# Patient Record
Sex: MALE | Race: WHITE | NOT HISPANIC OR LATINO | Employment: FULL TIME | URBAN - METROPOLITAN AREA
[De-identification: names, ages, dates, MRNs, and addresses within clinical notes are randomized per-mention and may not be internally consistent; named-entity substitution may affect disease eponyms.]

---

## 2020-09-16 ENCOUNTER — HOSPITAL ENCOUNTER (INPATIENT)
Facility: HOSPITAL | Age: 60
LOS: 2 days | Discharge: HOME | End: 2020-09-19
Attending: EMERGENCY MEDICINE | Admitting: HOSPITALIST

## 2020-09-16 DIAGNOSIS — I10 ESSENTIAL HYPERTENSION: ICD-10-CM

## 2020-09-16 DIAGNOSIS — I50.41 ACUTE COMBINED SYSTOLIC AND DIASTOLIC CONGESTIVE HEART FAILURE (HCC): Primary | ICD-10-CM

## 2020-09-16 PROCEDURE — 99284 EMERGENCY DEPT VISIT MOD MDM: CPT

## 2020-09-16 PROCEDURE — 93005 ELECTROCARDIOGRAM TRACING: CPT | Performed by: EMERGENCY MEDICINE

## 2020-09-17 ENCOUNTER — APPOINTMENT (OUTPATIENT)
Dept: CARDIOLOGY | Facility: HOSPITAL | Age: 60
End: 2020-09-17

## 2020-09-17 ENCOUNTER — APPOINTMENT (OUTPATIENT)
Dept: CT IMAGING | Facility: HOSPITAL | Age: 60
End: 2020-09-17

## 2020-09-17 ENCOUNTER — APPOINTMENT (OUTPATIENT)
Dept: GENERAL RADIOLOGY | Facility: HOSPITAL | Age: 60
End: 2020-09-17

## 2020-09-17 PROBLEM — F10.20 ALCOHOLISM (HCC): Status: ACTIVE | Noted: 2020-09-17

## 2020-09-17 PROBLEM — I10 ESSENTIAL HYPERTENSION: Status: ACTIVE | Noted: 2020-09-17

## 2020-09-17 PROBLEM — Z72.0 TOBACCO ABUSE: Status: ACTIVE | Noted: 2020-09-17

## 2020-09-17 PROBLEM — D75.89 MACROCYTOSIS WITHOUT ANEMIA: Status: ACTIVE | Noted: 2020-09-17

## 2020-09-17 PROBLEM — I50.41 ACUTE COMBINED SYSTOLIC AND DIASTOLIC CONGESTIVE HEART FAILURE: Status: ACTIVE | Noted: 2020-09-17

## 2020-09-17 LAB
ALBUMIN SERPL-MCNC: 4 G/DL (ref 3.5–5.2)
ALBUMIN/GLOB SERPL: 1.6 G/DL
ALP SERPL-CCNC: 89 U/L (ref 39–117)
ALT SERPL W P-5'-P-CCNC: 20 U/L (ref 1–41)
ANION GAP SERPL CALCULATED.3IONS-SCNC: 12 MMOL/L (ref 5–15)
ANION GAP SERPL CALCULATED.3IONS-SCNC: 13 MMOL/L (ref 5–15)
AST SERPL-CCNC: 31 U/L (ref 1–40)
B PARAPERT DNA SPEC QL NAA+PROBE: NOT DETECTED
B PERT DNA SPEC QL NAA+PROBE: NOT DETECTED
BASOPHILS # BLD AUTO: 0.07 10*3/MM3 (ref 0–0.2)
BASOPHILS # BLD AUTO: 0.1 10*3/MM3 (ref 0–0.2)
BASOPHILS NFR BLD AUTO: 0.7 % (ref 0–1.5)
BASOPHILS NFR BLD AUTO: 1 % (ref 0–1.5)
BH CV ECHO MEAS - AO ROOT AREA (BSA CORRECTED): 2
BH CV ECHO MEAS - AO ROOT AREA: 8.9 CM^2
BH CV ECHO MEAS - AO ROOT DIAM: 3.4 CM
BH CV ECHO MEAS - BSA(HAYCOCK): 1.7 M^2
BH CV ECHO MEAS - BSA: 1.7 M^2
BH CV ECHO MEAS - BZI_BMI: 19.9 KILOGRAMS/M^2
BH CV ECHO MEAS - BZI_METRIC_HEIGHT: 172.7 CM
BH CV ECHO MEAS - BZI_METRIC_WEIGHT: 59.4 KG
BH CV ECHO MEAS - EDV(CUBED): 309.1 ML
BH CV ECHO MEAS - EDV(MOD-SP2): 142 ML
BH CV ECHO MEAS - EDV(MOD-SP4): 151 ML
BH CV ECHO MEAS - EDV(TEICH): 236.2 ML
BH CV ECHO MEAS - EF(CUBED): 28.2 %
BH CV ECHO MEAS - EF(MOD-BP): 21 %
BH CV ECHO MEAS - EF(MOD-SP2): 17.6 %
BH CV ECHO MEAS - EF(MOD-SP4): 23.2 %
BH CV ECHO MEAS - EF(TEICH): 22.2 %
BH CV ECHO MEAS - ESV(CUBED): 221.8 ML
BH CV ECHO MEAS - ESV(MOD-SP2): 117 ML
BH CV ECHO MEAS - ESV(MOD-SP4): 116 ML
BH CV ECHO MEAS - ESV(TEICH): 183.7 ML
BH CV ECHO MEAS - FS: 10.5 %
BH CV ECHO MEAS - IVS/LVPW: 1.2
BH CV ECHO MEAS - IVSD: 1 CM
BH CV ECHO MEAS - LA DIMENSION: 4.9 CM
BH CV ECHO MEAS - LA/AO: 1.5
BH CV ECHO MEAS - LAD MAJOR: 6.9 CM
BH CV ECHO MEAS - LAT PEAK E' VEL: 5 CM/SEC
BH CV ECHO MEAS - LATERAL E/E' RATIO: 16.4
BH CV ECHO MEAS - LV DIASTOLIC VOL/BSA (35-75): 88.4 ML/M^2
BH CV ECHO MEAS - LV MASS(C)D: 278.4 GRAMS
BH CV ECHO MEAS - LV MASS(C)DI: 163 GRAMS/M^2
BH CV ECHO MEAS - LV SYSTOLIC VOL/BSA (12-30): 67.9 ML/M^2
BH CV ECHO MEAS - LVIDD: 6.8 CM
BH CV ECHO MEAS - LVIDS: 6.1 CM
BH CV ECHO MEAS - LVLD AP2: 7.3 CM
BH CV ECHO MEAS - LVLD AP4: 6.5 CM
BH CV ECHO MEAS - LVLS AP2: 7.5 CM
BH CV ECHO MEAS - LVLS AP4: 6.6 CM
BH CV ECHO MEAS - LVPWD: 0.84 CM
BH CV ECHO MEAS - MED PEAK E' VEL: 3.7 CM/SEC
BH CV ECHO MEAS - MEDIAL E/E' RATIO: 22
BH CV ECHO MEAS - MR ALIAS VEL: 94.7 CM/SEC
BH CV ECHO MEAS - MR ERO: 0.53 CM^2
BH CV ECHO MEAS - MR FLOW RATE: 279.3 CM^3/SEC
BH CV ECHO MEAS - MR MAX PG: 111 MMHG
BH CV ECHO MEAS - MR MAX VEL: 525.7 CM/SEC
BH CV ECHO MEAS - MR MEAN PG: 70.6 MMHG
BH CV ECHO MEAS - MR MEAN VEL: 394.3 CM/SEC
BH CV ECHO MEAS - MR PISA RADIUS: 0.69 CM
BH CV ECHO MEAS - MR PISA: 2.9 CM^2
BH CV ECHO MEAS - MR VOLUME: 106.2 ML
BH CV ECHO MEAS - MR VTI: 199.9 CM
BH CV ECHO MEAS - MV A MAX VEL: 80 CM/SEC
BH CV ECHO MEAS - MV AREA (1 DIAM): 17.6 CM^2
BH CV ECHO MEAS - MV DEC SLOPE: 924.2 CM/SEC^2
BH CV ECHO MEAS - MV DEC TIME: 0.11 SEC
BH CV ECHO MEAS - MV DIAM: 4.7 CM
BH CV ECHO MEAS - MV E MAX VEL: 84.9 CM/SEC
BH CV ECHO MEAS - MV E/A: 1.1
BH CV ECHO MEAS - MV FLOW AREA(1DIAM): 17.6 CM^2
BH CV ECHO MEAS - MV MAX PG: 4.5 MMHG
BH CV ECHO MEAS - MV MEAN PG: 2.3 MMHG
BH CV ECHO MEAS - MV P1/2T MAX VEL: 117 CM/SEC
BH CV ECHO MEAS - MV P1/2T: 37.1 MSEC
BH CV ECHO MEAS - MV V2 MAX: 106 CM/SEC
BH CV ECHO MEAS - MV V2 MEAN: 73 CM/SEC
BH CV ECHO MEAS - MV V2 VTI: 16.2 CM
BH CV ECHO MEAS - MVA P1/2T LCG: 1.9 CM^2
BH CV ECHO MEAS - MVA(P1/2T): 5.9 CM^2
BH CV ECHO MEAS - PA ACC SLOPE: 301.2 CM/SEC^2
BH CV ECHO MEAS - PA ACC TIME: 0.18 SEC
BH CV ECHO MEAS - PA PR(ACCEL): -3.4 MMHG
BH CV ECHO MEAS - SI(CUBED): 51.1 ML/M^2
BH CV ECHO MEAS - SI(MOD-SP2): 14.6 ML/M^2
BH CV ECHO MEAS - SI(MOD-SP4): 20.5 ML/M^2
BH CV ECHO MEAS - SI(MV 1 DIAM): 166.6 ML/M^2
BH CV ECHO MEAS - SI(TEICH): 30.7 ML/M^2
BH CV ECHO MEAS - SV(CUBED): 87.3 ML
BH CV ECHO MEAS - SV(MOD-SP2): 25 ML
BH CV ECHO MEAS - SV(MOD-SP4): 35 ML
BH CV ECHO MEAS - SV(MV 1 DIAM): 284.4 ML
BH CV ECHO MEAS - SV(TEICH): 52.5 ML
BH CV ECHO MEAS - TAPSE (>1.6): 2.4 CM2
BH CV ECHO MEASUREMENTS AVERAGE E/E' RATIO: 19.52
BH CV VAS BP RIGHT ARM: NORMAL MMHG
BH CV XLRA - RV BASE: 3.6 CM
BH CV XLRA - RV LENGTH: 7.1 CM
BH CV XLRA - RV MID: 2.5 CM
BH CV XLRA - TDI S': 11.3 CM/SEC
BILIRUB SERPL-MCNC: 0.3 MG/DL (ref 0–1.2)
BUN SERPL-MCNC: 17 MG/DL (ref 6–20)
BUN SERPL-MCNC: 18 MG/DL (ref 6–20)
BUN/CREAT SERPL: 14.9 (ref 7–25)
BUN/CREAT SERPL: 14.9 (ref 7–25)
C PNEUM DNA NPH QL NAA+NON-PROBE: NOT DETECTED
CALCIUM SPEC-SCNC: 9.2 MG/DL (ref 8.6–10.5)
CALCIUM SPEC-SCNC: 9.4 MG/DL (ref 8.6–10.5)
CHLORIDE SERPL-SCNC: 102 MMOL/L (ref 98–107)
CHLORIDE SERPL-SCNC: 97 MMOL/L (ref 98–107)
CHOLEST SERPL-MCNC: 129 MG/DL (ref 0–200)
CO2 SERPL-SCNC: 26 MMOL/L (ref 22–29)
CO2 SERPL-SCNC: 28 MMOL/L (ref 22–29)
CREAT SERPL-MCNC: 1.14 MG/DL (ref 0.76–1.27)
CREAT SERPL-MCNC: 1.21 MG/DL (ref 0.76–1.27)
DEPRECATED RDW RBC AUTO: 45.6 FL (ref 37–54)
DEPRECATED RDW RBC AUTO: 46 FL (ref 37–54)
EOSINOPHIL # BLD AUTO: 0.17 10*3/MM3 (ref 0–0.4)
EOSINOPHIL # BLD AUTO: 0.23 10*3/MM3 (ref 0–0.4)
EOSINOPHIL NFR BLD AUTO: 1.8 % (ref 0.3–6.2)
EOSINOPHIL NFR BLD AUTO: 2.2 % (ref 0.3–6.2)
ERYTHROCYTE [DISTWIDTH] IN BLOOD BY AUTOMATED COUNT: 12.1 % (ref 12.3–15.4)
ERYTHROCYTE [DISTWIDTH] IN BLOOD BY AUTOMATED COUNT: 12.2 % (ref 12.3–15.4)
FLUAV H1 2009 PAND RNA NPH QL NAA+PROBE: NOT DETECTED
FLUAV H1 HA GENE NPH QL NAA+PROBE: NOT DETECTED
FLUAV H3 RNA NPH QL NAA+PROBE: NOT DETECTED
FLUAV SUBTYP SPEC NAA+PROBE: NOT DETECTED
FLUBV RNA ISLT QL NAA+PROBE: NOT DETECTED
GFR SERPL CREATININE-BSD FRML MDRD: 62 ML/MIN/1.73
GFR SERPL CREATININE-BSD FRML MDRD: 66 ML/MIN/1.73
GLOBULIN UR ELPH-MCNC: 2.5 GM/DL
GLUCOSE BLDC GLUCOMTR-MCNC: 104 MG/DL (ref 70–130)
GLUCOSE SERPL-MCNC: 86 MG/DL (ref 65–99)
GLUCOSE SERPL-MCNC: 92 MG/DL (ref 65–99)
HADV DNA SPEC NAA+PROBE: NOT DETECTED
HCOV 229E RNA SPEC QL NAA+PROBE: NOT DETECTED
HCOV HKU1 RNA SPEC QL NAA+PROBE: NOT DETECTED
HCOV NL63 RNA SPEC QL NAA+PROBE: NOT DETECTED
HCOV OC43 RNA SPEC QL NAA+PROBE: NOT DETECTED
HCT VFR BLD AUTO: 44.6 % (ref 37.5–51)
HCT VFR BLD AUTO: 45.4 % (ref 37.5–51)
HDLC SERPL-MCNC: 43 MG/DL (ref 40–60)
HGB BLD-MCNC: 15.2 G/DL (ref 13–17.7)
HGB BLD-MCNC: 15.6 G/DL (ref 13–17.7)
HMPV RNA NPH QL NAA+NON-PROBE: NOT DETECTED
HOLD SPECIMEN: NORMAL
HOLD SPECIMEN: NORMAL
HPIV1 RNA SPEC QL NAA+PROBE: NOT DETECTED
HPIV2 RNA SPEC QL NAA+PROBE: NOT DETECTED
HPIV3 RNA NPH QL NAA+PROBE: NOT DETECTED
HPIV4 P GENE NPH QL NAA+PROBE: NOT DETECTED
IMM GRANULOCYTES # BLD AUTO: 0.03 10*3/MM3 (ref 0–0.05)
IMM GRANULOCYTES # BLD AUTO: 0.04 10*3/MM3 (ref 0–0.05)
IMM GRANULOCYTES NFR BLD AUTO: 0.3 % (ref 0–0.5)
IMM GRANULOCYTES NFR BLD AUTO: 0.4 % (ref 0–0.5)
LDLC SERPL CALC-MCNC: 77 MG/DL (ref 0–100)
LDLC/HDLC SERPL: 1.78 {RATIO}
LYMPHOCYTES # BLD AUTO: 1.73 10*3/MM3 (ref 0.7–3.1)
LYMPHOCYTES # BLD AUTO: 1.96 10*3/MM3 (ref 0.7–3.1)
LYMPHOCYTES NFR BLD AUTO: 18.2 % (ref 19.6–45.3)
LYMPHOCYTES NFR BLD AUTO: 18.7 % (ref 19.6–45.3)
M PNEUMO IGG SER IA-ACNC: NOT DETECTED
MAXIMAL PREDICTED HEART RATE: 162 BPM
MCH RBC QN AUTO: 34.9 PG (ref 26.6–33)
MCH RBC QN AUTO: 35 PG (ref 26.6–33)
MCHC RBC AUTO-ENTMCNC: 34.1 G/DL (ref 31.5–35.7)
MCHC RBC AUTO-ENTMCNC: 34.4 G/DL (ref 31.5–35.7)
MCV RBC AUTO: 101.8 FL (ref 79–97)
MCV RBC AUTO: 102.3 FL (ref 79–97)
MONOCYTES # BLD AUTO: 0.78 10*3/MM3 (ref 0.1–0.9)
MONOCYTES # BLD AUTO: 0.94 10*3/MM3 (ref 0.1–0.9)
MONOCYTES NFR BLD AUTO: 8.2 % (ref 5–12)
MONOCYTES NFR BLD AUTO: 9 % (ref 5–12)
NEUTROPHILS NFR BLD AUTO: 6.7 10*3/MM3 (ref 1.7–7)
NEUTROPHILS NFR BLD AUTO: 68.7 % (ref 42.7–76)
NEUTROPHILS NFR BLD AUTO: 7.23 10*3/MM3 (ref 1.7–7)
NEUTROPHILS NFR BLD AUTO: 70.8 % (ref 42.7–76)
NRBC BLD AUTO-RTO: 0 /100 WBC (ref 0–0.2)
NRBC BLD AUTO-RTO: 0 /100 WBC (ref 0–0.2)
NT-PROBNP SERPL-MCNC: 5229 PG/ML (ref 0–900)
PLATELET # BLD AUTO: 176 10*3/MM3 (ref 140–450)
PLATELET # BLD AUTO: 187 10*3/MM3 (ref 140–450)
PMV BLD AUTO: 10.3 FL (ref 6–12)
PMV BLD AUTO: 10.4 FL (ref 6–12)
POTASSIUM SERPL-SCNC: 3.6 MMOL/L (ref 3.5–5.2)
POTASSIUM SERPL-SCNC: 3.9 MMOL/L (ref 3.5–5.2)
PROT SERPL-MCNC: 6.5 G/DL (ref 6–8.5)
RBC # BLD AUTO: 4.36 10*6/MM3 (ref 4.14–5.8)
RBC # BLD AUTO: 4.46 10*6/MM3 (ref 4.14–5.8)
RHINOVIRUS RNA SPEC NAA+PROBE: NOT DETECTED
RSV RNA NPH QL NAA+NON-PROBE: NOT DETECTED
SARS-COV-2 RNA NPH QL NAA+NON-PROBE: NOT DETECTED
SODIUM SERPL-SCNC: 138 MMOL/L (ref 136–145)
SODIUM SERPL-SCNC: 140 MMOL/L (ref 136–145)
STRESS TARGET HR: 138 BPM
TRIGL SERPL-MCNC: 47 MG/DL (ref 0–150)
TROPONIN T SERPL-MCNC: <0.01 NG/ML (ref 0–0.03)
TSH SERPL DL<=0.05 MIU/L-ACNC: 2.6 UIU/ML (ref 0.27–4.2)
VLDLC SERPL-MCNC: 9.4 MG/DL
WBC # BLD AUTO: 10.5 10*3/MM3 (ref 3.4–10.8)
WBC # BLD AUTO: 9.48 10*3/MM3 (ref 3.4–10.8)
WHOLE BLOOD HOLD SPECIMEN: NORMAL
WHOLE BLOOD HOLD SPECIMEN: NORMAL

## 2020-09-17 PROCEDURE — 84484 ASSAY OF TROPONIN QUANT: CPT | Performed by: PHYSICIAN ASSISTANT

## 2020-09-17 PROCEDURE — 85025 COMPLETE CBC W/AUTO DIFF WBC: CPT | Performed by: EMERGENCY MEDICINE

## 2020-09-17 PROCEDURE — 94799 UNLISTED PULMONARY SVC/PX: CPT

## 2020-09-17 PROCEDURE — 80053 COMPREHEN METABOLIC PANEL: CPT | Performed by: EMERGENCY MEDICINE

## 2020-09-17 PROCEDURE — 80061 LIPID PANEL: CPT | Performed by: PHYSICIAN ASSISTANT

## 2020-09-17 PROCEDURE — 99223 1ST HOSP IP/OBS HIGH 75: CPT | Performed by: FAMILY MEDICINE

## 2020-09-17 PROCEDURE — 25010000002 FUROSEMIDE PER 20 MG: Performed by: HOSPITALIST

## 2020-09-17 PROCEDURE — 80048 BASIC METABOLIC PNL TOTAL CA: CPT | Performed by: PHYSICIAN ASSISTANT

## 2020-09-17 PROCEDURE — 25010000002 ENOXAPARIN PER 10 MG: Performed by: PHYSICIAN ASSISTANT

## 2020-09-17 PROCEDURE — 94640 AIRWAY INHALATION TREATMENT: CPT

## 2020-09-17 PROCEDURE — 84443 ASSAY THYROID STIM HORMONE: CPT | Performed by: PHYSICIAN ASSISTANT

## 2020-09-17 PROCEDURE — 83880 ASSAY OF NATRIURETIC PEPTIDE: CPT | Performed by: EMERGENCY MEDICINE

## 2020-09-17 PROCEDURE — 25010000002 FUROSEMIDE PER 20 MG: Performed by: EMERGENCY MEDICINE

## 2020-09-17 PROCEDURE — 0202U NFCT DS 22 TRGT SARS-COV-2: CPT | Performed by: INTERNAL MEDICINE

## 2020-09-17 PROCEDURE — 85025 COMPLETE CBC W/AUTO DIFF WBC: CPT | Performed by: FAMILY MEDICINE

## 2020-09-17 PROCEDURE — 84484 ASSAY OF TROPONIN QUANT: CPT | Performed by: EMERGENCY MEDICINE

## 2020-09-17 PROCEDURE — 71275 CT ANGIOGRAPHY CHEST: CPT

## 2020-09-17 PROCEDURE — 93306 TTE W/DOPPLER COMPLETE: CPT

## 2020-09-17 PROCEDURE — 71045 X-RAY EXAM CHEST 1 VIEW: CPT

## 2020-09-17 PROCEDURE — 0 IOPAMIDOL PER 1 ML: Performed by: FAMILY MEDICINE

## 2020-09-17 PROCEDURE — 82962 GLUCOSE BLOOD TEST: CPT

## 2020-09-17 RX ORDER — FOLIC ACID 1 MG/1
1 TABLET ORAL DAILY
Status: DISCONTINUED | OUTPATIENT
Start: 2020-09-17 | End: 2020-09-19 | Stop reason: HOSPADM

## 2020-09-17 RX ORDER — ASPIRIN 81 MG/1
81 TABLET ORAL DAILY
Status: DISCONTINUED | OUTPATIENT
Start: 2020-09-18 | End: 2020-09-19 | Stop reason: HOSPADM

## 2020-09-17 RX ORDER — LORAZEPAM 2 MG/ML
4 INJECTION INTRAMUSCULAR
Status: DISCONTINUED | OUTPATIENT
Start: 2020-09-17 | End: 2020-09-19 | Stop reason: HOSPADM

## 2020-09-17 RX ORDER — LORAZEPAM 1 MG/1
1 TABLET ORAL
Status: DISCONTINUED | OUTPATIENT
Start: 2020-09-17 | End: 2020-09-19 | Stop reason: HOSPADM

## 2020-09-17 RX ORDER — THIAMINE MONONITRATE (VIT B1) 100 MG
100 TABLET ORAL DAILY
Status: DISCONTINUED | OUTPATIENT
Start: 2020-09-18 | End: 2020-09-17

## 2020-09-17 RX ORDER — SODIUM CHLORIDE 0.9 % (FLUSH) 0.9 %
10 SYRINGE (ML) INJECTION AS NEEDED
Status: DISCONTINUED | OUTPATIENT
Start: 2020-09-17 | End: 2020-09-19 | Stop reason: HOSPADM

## 2020-09-17 RX ORDER — SPIRONOLACTONE 25 MG/1
25 TABLET ORAL DAILY
Status: DISCONTINUED | OUTPATIENT
Start: 2020-09-17 | End: 2020-09-19 | Stop reason: HOSPADM

## 2020-09-17 RX ORDER — DIPHENOXYLATE HYDROCHLORIDE AND ATROPINE SULFATE 2.5; .025 MG/1; MG/1
1 TABLET ORAL DAILY
Status: DISCONTINUED | OUTPATIENT
Start: 2020-09-17 | End: 2020-09-19 | Stop reason: HOSPADM

## 2020-09-17 RX ORDER — ONDANSETRON 2 MG/ML
4 INJECTION INTRAMUSCULAR; INTRAVENOUS EVERY 6 HOURS PRN
Status: DISCONTINUED | OUTPATIENT
Start: 2020-09-17 | End: 2020-09-19 | Stop reason: HOSPADM

## 2020-09-17 RX ORDER — LORAZEPAM 1 MG/1
2 TABLET ORAL
Status: DISCONTINUED | OUTPATIENT
Start: 2020-09-17 | End: 2020-09-19 | Stop reason: HOSPADM

## 2020-09-17 RX ORDER — LORAZEPAM 2 MG/ML
2 INJECTION INTRAMUSCULAR
Status: DISCONTINUED | OUTPATIENT
Start: 2020-09-17 | End: 2020-09-19 | Stop reason: HOSPADM

## 2020-09-17 RX ORDER — THIAMINE MONONITRATE (VIT B1) 100 MG
100 TABLET ORAL DAILY
Status: DISCONTINUED | OUTPATIENT
Start: 2020-09-17 | End: 2020-09-19 | Stop reason: HOSPADM

## 2020-09-17 RX ORDER — IPRATROPIUM BROMIDE AND ALBUTEROL SULFATE 2.5; .5 MG/3ML; MG/3ML
3 SOLUTION RESPIRATORY (INHALATION)
Status: DISCONTINUED | OUTPATIENT
Start: 2020-09-17 | End: 2020-09-18

## 2020-09-17 RX ORDER — ASPIRIN 81 MG/1
324 TABLET, CHEWABLE ORAL ONCE
Status: DISCONTINUED | OUTPATIENT
Start: 2020-09-17 | End: 2020-09-17

## 2020-09-17 RX ORDER — LORAZEPAM 1 MG/1
4 TABLET ORAL
Status: DISCONTINUED | OUTPATIENT
Start: 2020-09-17 | End: 2020-09-19 | Stop reason: HOSPADM

## 2020-09-17 RX ORDER — DIPHENOXYLATE HYDROCHLORIDE AND ATROPINE SULFATE 2.5; .025 MG/1; MG/1
1 TABLET ORAL DAILY
Status: DISCONTINUED | OUTPATIENT
Start: 2020-09-18 | End: 2020-09-17

## 2020-09-17 RX ORDER — METOPROLOL SUCCINATE 25 MG/1
12.5 TABLET, EXTENDED RELEASE ORAL NIGHTLY
Status: DISCONTINUED | OUTPATIENT
Start: 2020-09-17 | End: 2020-09-19 | Stop reason: HOSPADM

## 2020-09-17 RX ORDER — ACETAMINOPHEN 325 MG/1
650 TABLET ORAL EVERY 4 HOURS PRN
Status: DISCONTINUED | OUTPATIENT
Start: 2020-09-17 | End: 2020-09-18 | Stop reason: SDUPTHER

## 2020-09-17 RX ORDER — FUROSEMIDE 40 MG/1
40 TABLET ORAL DAILY
Status: DISCONTINUED | OUTPATIENT
Start: 2020-09-17 | End: 2020-09-19 | Stop reason: HOSPADM

## 2020-09-17 RX ORDER — FUROSEMIDE 10 MG/ML
40 INJECTION INTRAMUSCULAR; INTRAVENOUS ONCE
Status: COMPLETED | OUTPATIENT
Start: 2020-09-17 | End: 2020-09-17

## 2020-09-17 RX ORDER — FOLIC ACID 1 MG/1
1 TABLET ORAL DAILY
Status: DISCONTINUED | OUTPATIENT
Start: 2020-09-18 | End: 2020-09-17

## 2020-09-17 RX ORDER — CHOLECALCIFEROL (VITAMIN D3) 125 MCG
5 CAPSULE ORAL NIGHTLY PRN
Status: DISCONTINUED | OUTPATIENT
Start: 2020-09-17 | End: 2020-09-19 | Stop reason: HOSPADM

## 2020-09-17 RX ORDER — LORAZEPAM 2 MG/ML
1 INJECTION INTRAMUSCULAR
Status: DISCONTINUED | OUTPATIENT
Start: 2020-09-17 | End: 2020-09-19 | Stop reason: HOSPADM

## 2020-09-17 RX ORDER — SODIUM CHLORIDE 0.9 % (FLUSH) 0.9 %
10 SYRINGE (ML) INJECTION EVERY 12 HOURS SCHEDULED
Status: DISCONTINUED | OUTPATIENT
Start: 2020-09-17 | End: 2020-09-19 | Stop reason: HOSPADM

## 2020-09-17 RX ADMIN — MULTIVITAMIN TABLET 1 TABLET: TABLET at 03:18

## 2020-09-17 RX ADMIN — METOPROLOL SUCCINATE 12.5 MG: 25 TABLET, EXTENDED RELEASE ORAL at 20:36

## 2020-09-17 RX ADMIN — SPIRONOLACTONE 25 MG: 25 TABLET ORAL at 11:54

## 2020-09-17 RX ADMIN — MULTIVITAMIN TABLET 1 TABLET: TABLET at 20:37

## 2020-09-17 RX ADMIN — SACUBITRIL AND VALSARTAN 1 TABLET: 24; 26 TABLET, FILM COATED ORAL at 20:36

## 2020-09-17 RX ADMIN — IPRATROPIUM BROMIDE AND ALBUTEROL SULFATE 3 ML: 2.5; .5 SOLUTION RESPIRATORY (INHALATION) at 09:40

## 2020-09-17 RX ADMIN — IOPAMIDOL 75 ML: 755 INJECTION, SOLUTION INTRAVENOUS at 03:36

## 2020-09-17 RX ADMIN — IPRATROPIUM BROMIDE AND ALBUTEROL SULFATE 3 ML: 2.5; .5 SOLUTION RESPIRATORY (INHALATION) at 14:14

## 2020-09-17 RX ADMIN — IPRATROPIUM BROMIDE AND ALBUTEROL SULFATE 3 ML: 2.5; .5 SOLUTION RESPIRATORY (INHALATION) at 16:58

## 2020-09-17 RX ADMIN — THIAMINE HCL TAB 100 MG 100 MG: 100 TAB at 20:37

## 2020-09-17 RX ADMIN — MELATONIN TAB 5 MG 5 MG: 5 TAB at 20:35

## 2020-09-17 RX ADMIN — IPRATROPIUM BROMIDE AND ALBUTEROL SULFATE 3 ML: 2.5; .5 SOLUTION RESPIRATORY (INHALATION) at 19:49

## 2020-09-17 RX ADMIN — FUROSEMIDE 40 MG: 10 INJECTION, SOLUTION INTRAMUSCULAR; INTRAVENOUS at 08:27

## 2020-09-17 RX ADMIN — LORAZEPAM 1 MG: 1 TABLET ORAL at 05:25

## 2020-09-17 RX ADMIN — SODIUM CHLORIDE, PRESERVATIVE FREE 10 ML: 5 INJECTION INTRAVENOUS at 20:37

## 2020-09-17 RX ADMIN — ENOXAPARIN SODIUM 40 MG: 40 INJECTION SUBCUTANEOUS at 05:25

## 2020-09-17 RX ADMIN — SACUBITRIL AND VALSARTAN 1 TABLET: 24; 26 TABLET, FILM COATED ORAL at 11:54

## 2020-09-17 RX ADMIN — METOPROLOL SUCCINATE 12.5 MG: 25 TABLET, EXTENDED RELEASE ORAL at 03:18

## 2020-09-17 RX ADMIN — FUROSEMIDE 40 MG: 10 INJECTION, SOLUTION INTRAMUSCULAR; INTRAVENOUS at 01:03

## 2020-09-17 RX ADMIN — THIAMINE HCL TAB 100 MG 100 MG: 100 TAB at 03:17

## 2020-09-17 RX ADMIN — FOLIC ACID 1 MG: 1 TABLET ORAL at 20:38

## 2020-09-17 RX ADMIN — FOLIC ACID 1 MG: 1 TABLET ORAL at 03:17

## 2020-09-17 RX ADMIN — FUROSEMIDE 40 MG: 40 TABLET ORAL at 11:54

## 2020-09-17 RX ADMIN — LORAZEPAM 1 MG: 1 TABLET ORAL at 03:17

## 2020-09-17 NOTE — PROGRESS NOTES
Discharge Planning Assessment  [facility]     Patient Name: Sam Wilhelm  MRN: [id number]  Today's Date: 9/17/2020    Admit Date: 9/16/2020    Discharge Needs Assessment     Row Name 09/17/20 1347       Living Environment    Lives With  alone    Current Living Arrangements  home/apartment/condo    Primary Care Provided by  self    Provides Primary Care For  no one    Family Caregiver if Needed  significant other    Family Caregiver Names  Ingrid Burton- MELISA    Quality of Family Relationships  supportive;involved    Able to Return to Prior Arrangements  yes       Resource/Environmental Concerns    Resource/Environmental Concerns  none    Transportation Concerns  car, none       Transition Planning    Patient/Family Anticipates Transition to  home    Patient/Family Anticipated Services at Transition      Transportation Anticipated  family or friend will provide       Discharge Needs Assessment    Equipment Currently Used at Home  none    Concerns to be Addressed  discharge planning    Anticipated Changes Related to Illness  none    Equipment Needed After Discharge  none    Provided Post Acute Provider List?  N/A    N/A Provider List Comment  Denies need        Discharge Plan     Row Name 09/17/20 5347       Plan    Plan  Home    Patient/Family in Agreement with Plan  yes    Plan Comments  Spoke with pt. and SO at bedside.Lives alone in [facility]. Independent PTA. No DME used and voices no needs at this time. Plan is to dc to home with assistance from his girlfriend. States she will transport him.    Final Discharge Disposition Code  01 - home or self-care        Continued Care and Services - Admitted Since 9/16/2020    Coordination has not been started for this encounter.         Demographic Summary    No documentation.       Functional Status     Row Name 09/17/20 4047       Functional Status    Usual Activity Tolerance  good       Functional Status, IADL    Medications  independent    Meal  Preparation  independent    Housekeeping  independent    Laundry  independent    Shopping  independent        Psychosocial    No documentation.       Abuse/Neglect    No documentation.       Legal    No documentation.       Substance Abuse    No documentation.       Patient Forms    No documentation.           Laure Broderick RN

## 2020-09-17 NOTE — PAYOR COMM NOTE
"Carmela Gibbs RN CM  Phone [phone]  Fax [phone]      Sam Wilhelm (60 y.o. Male)     Date of Birth Social Security Number Address Home Phone MRN    1960  [address] [id number] [phone] [id number]    Buddhist Marital Status          None        Admission Date Admission Type Admitting Provider Attending Provider Department, Room/Bed    20 Emergency Hemalatha Ruelas MD Reddy, Mayuri V, MD [facility] 6A, N612/1    Discharge Date Discharge Disposition Discharge Destination                       Attending Provider: Hemalatha Ruelas MD    Allergies: No Known Allergies    Isolation: None   Infection: None   Code Status: CPR    Ht: 172.7 cm (68\")   Wt: 59 kg (130 lb)    Admission Cmt: None   Principal Problem: Acute combined systolic and diastolic congestive heart failure (CMS/HCC) [I50.41]                 Active Insurance as of 2020     Primary Coverage     Payor Plan Insurance Group Employer/Plan Group    [facility] BLUE [facility] EMPLOYEE [id number]     Payor Plan Address Payor Plan Phone Number Payor Plan Fax Number Effective Dates    [address] [phone]  2015 - None Entered    [place] [address]       Subscriber Name Subscriber Birth Date Member ID       SAM WILHELM 1960 [id number]                 Emergency Contacts      (Rel.) Home Phone Work Phone Mobile Phone    EDGAR GRADY (Friend) [phone] -- [phone]               History & Physical      Carolann Miranda DO at 20 0219              [facility] Medicine Services  HISTORY AND PHYSICAL    Patient Name: Sam Wilhelm  : 1960  MRN: [id number]  Primary Care Physician: Steve Bhatia MD  Date of admission: 2020      Subjective   Subjective     Chief Complaint:  SOB    HPI:  Sam Wilhelm is a 60 y.o. male with a past medical history significant for hypertension. He " presents today with complaints of progressively worsening SOB going on for the past month. Dyspnea worse with exertion and lying flat. Acutely worse today with some weakness which prompted his visit to ED. Patient reports smoking 2 PPD for 35 years. States he quit last Wednesday. Also reports drinking approximately 6 beers nightly. He is not on any chronic medications and denies history of CAD, COPD, CHF, or DVT/PE. Also denies cough, congestion, fever, chest pain, syncope, or lower extremity edema. No known exposure to COVID-19. Will admit to inpatient.    Emergency Department Evaluation; /123. Vitals otherwise stable. Troponin negative. BNP 5200. .3. chemistry and hematology otherwise favorable. CXR demonstrates acute congestive heart failure with interstitial edema bilaterally. EKG normal sinus      COVID-19 RISK SCREEN     1. Has the patient had close contact without PPE with a lab confirmed COVID-19 (+) person or a person under investigation (PUI) for COVID-19 infection?  -- No     2. Has the patient had respiratory symptoms, worsened/new cough and/or SOA, unexplained fever, or sudden loss of smell and/or taste in the past 7 days? --  Yes    3. Does the patient have baseline higher exposure risk such as working in healthcare field, currently residing in healthcare facility, or ongoing hemodialysis?  --  No         Review of Systems   Constitutional: Negative for chills, diaphoresis, fatigue and fever.   HENT: Negative for congestion and trouble swallowing.    Eyes: Positive for discharge. Negative for photophobia and visual disturbance.   Respiratory: Positive for shortness of breath and wheezing. Negative for cough.    Cardiovascular: Positive for chest pain (stinging pain in mid sternal region. random, lasts seconds. ). Negative for palpitations and leg swelling.   Gastrointestinal: Negative for abdominal pain, diarrhea, nausea and vomiting.   Endocrine: Negative for cold intolerance and heat  intolerance.   Genitourinary: Negative for dysuria and flank pain.   Musculoskeletal: Negative for back pain and gait problem.   Skin: Negative for pallor and rash.   Allergic/Immunologic: Negative for immunocompromised state.   Neurological: Positive for weakness. Negative for dizziness and headaches.   Hematological: Negative for adenopathy.   Psychiatric/Behavioral: Negative for agitation and confusion.        All other systems reviewed and are negative.     Personal History     Past Medical History:   Diagnosis Date   • B12 deficiency    • Essential hypertension 9/17/2020       Past Surgical History:   Procedure Laterality Date   • HAND SURGERY         Family History: family history includes Dementia in his mother; Heart disease in his father; Stroke in his father. Otherwise pertinent FHx was reviewed and unremarkable.     Social History:  reports that he quit smoking 8 days ago. His smoking use included cigarettes. He smoked 2.00 packs per day. He does not have any smokeless tobacco history on file. He reports current alcohol use. He reports previous drug use.  Social History     Social History Narrative    Lives alone. Cares for mother who has heart failure.        Medications:  Available home medication information reviewed.  Medications Prior to Admission   Medication Sig Dispense Refill Last Dose   • Ascorbic Acid (VITAMIN C PO) Take  by mouth.    at Unknown time   • Cyanocobalamin (VITAMIN B-12 IJ) Inject  as directed Every 30 (Thirty) Days.      • Naproxen Sod-diphenhydrAMINE (ALEVE PM PO) Take  by mouth Every Night.          No Known Allergies    Objective   Objective     Vital Signs:   Temp:  [98.4 °F (36.9 °C)] 98.4 °F (36.9 °C)  Heart Rate:  [69-98] 98  Resp:  [12-16] 16  BP: (136-145)/(92-99) 145/92        Physical Exam   Constitutional: Awake, alert  Eyes: PERRLA, sclerae anicteric, no conjunctival injection  HENT: NCAT, mucous membranes moist  Neck: Supple, no thyromegaly, no lymphadenopathy,  trachea midline  Respiratory: rales noted Bilaterally in bases, nonlabored respirations   Cardiovascular: RRR, no murmurs, rubs, or gallops, palpable pedal pulses bilaterally  Gastrointestinal: Positive bowel sounds, soft, nontender, nondistended  Musculoskeletal: No bilateral ankle edema, no clubbing or cyanosis to extremities  Psychiatric: Appropriate affect, cooperative  Neurologic: Oriented x 3, strength symmetric in all extremities, Cranial Nerves grossly intact to confrontation, speech clear  Skin: No rashes      Results Reviewed:  I have personally reviewed current lab and radiology data.    Results from last 7 days   Lab Units 09/17/20  0010   WBC 10*3/mm3 10.50   HEMOGLOBIN g/dL 15.2   HEMATOCRIT % 44.6   PLATELETS 10*3/mm3 176     Results from last 7 days   Lab Units 09/17/20  0010   SODIUM mmol/L 140   POTASSIUM mmol/L 3.9   CHLORIDE mmol/L 102   CO2 mmol/L 26.0   BUN mg/dL 18   CREATININE mg/dL 1.21   GLUCOSE mg/dL 92   CALCIUM mg/dL 9.2   ALT (SGPT) U/L 20   AST (SGOT) U/L 31   TROPONIN T ng/mL <0.010   PROBNP pg/mL 5,229.0*     Estimated Creatinine Clearance: 59.8 mL/min (by C-G formula based on SCr of 1.21 mg/dL).  Brief Urine Lab Results     None        Imaging Results (Last 24 Hours)     Procedure Component Value Units Date/Time    XR Chest 1 View [[id number]] Collected: 09/17/20 0051     Updated: 09/17/20 0053    Narrative:      CR Chest 1 Vw    INDICATION:   Mid chest pain with shortness of breath on arrival     COMPARISON:    None available.    FINDINGS:  Single portable AP view(s) of the chest.  Cardiomegaly is noted. The aorta is tortuous. In the lungs pulmonary vascular markings are diffusely prominent and there is diffuse interstitial edema with thickening of the interlobular septae bilaterally.  Findings suggest moderate congestive heart failure. No effusions are seen. No focal infiltrates are noted.      Impression:      Moderate congestive heart failure with interstitial edema bilaterally.  Emphysema.    Signer Name: Enrique John MD   Signed: 9/17/2020 12:51 AM   Workstation Name: [id number]    [facility]             Assessment/Plan   Assessment & Plan     Active Hospital Problems    Diagnosis POA   • **Acute combined systolic and diastolic congestive heart failure (CMS/HCC) [I50.41] Yes     Priority: High   • Essential hypertension [I10] Yes     Priority: Medium   • Macrocytosis without anemia [D75.89] Yes     Priority: Medium   • Alcoholism (CMS/HCC) [F10.20] Yes     Priority: Medium   • Tobacco abuse [Z72.0] Yes     Priority: Low       1. Acute Heart Failure:  - new onset. Bedside echocardiogram demonstrated markedly reduced EF  - administered 40 mg lasix in ED  - troponin negative. Continue to trend cardiac enzymes  - strict I&O, daily weights  - obtain echocardiogram  - check TSH  - consult to cardiology  - am labs  - Get stat CTA of chest     2. Hypertension:  - not treated. Currently stable.  - PRN meds as needed  -start low dose metoprolol succinate     3. Alcoholism:  - CIWA protocol with as needed Ativan  - oral vitamin replacement  - check b12, folate    4. Tobacco abuse:  - long time heavy smoker. Quit 1 week ago.  - nicotine patch as needed    DVT prophylaxis: lovenox      CODE STATUS:  Full code  Code Status and Medical Interventions:   Ordered at: 09/17/20 0115     Level Of Support Discussed With:    Patient     Code Status:    CPR     Medical Interventions (Level of Support Prior to Arrest):    Full       Admission Status:  I believe this patient meets INPATIENT status due to SOB.  I feel patient’s risk for adverse outcomes and need for care warrant INPATIENT evaluation and I predict the patient’s care encounter to likely last beyond 2 midnights.    Electronically signed by Daria Sparrow PA-C, 09/17/20, 2:33 AM EDT.      Attending   Admission Attestation       I have seen and examined the patient, performing an independent face-to-face diagnostic  "evaluation with plan of care reviewed and developed with the advanced practice clinician (APC).      Brief Summary Statement:   Sam Wilhelm is a 60 y.o. male with past medical history COPD, HTN and B12 deficiency.  Patient presented to [facility] ED with worsening shortness of breath for the past month.  States that he has felt very anxious and panicked due to the shortness of breath.  He went to see his PCP in [place] who recommended outpatient testing.  After results were obtained patient was advised to go to the ED and decided to come to [facility] ED as he is followed by Dr. Monroy.  Patient was noted in the ED to have a elevated proBNP of 5,229.  Chest x-ray noted \"Moderate congestive heart failure with interstitial edema bilaterally. Emphysema.\"  Therefore the decision was made to admit patient to [facility] for IV Lasix and further evaluation of CHF.    Remainder of detailed HPI is as noted by APC and has been reviewed and/or edited by me for completeness.    Attending Physical Exam:  Constitutional: No acute distress, awake, alert  HENT: NCAT, mucous membranes moist  Respiratory: Rales noted bilaterally in lung bases, respiratory effort normal   Cardiovascular: RRR, no murmurs, rubs, or gallops, palpable pedal pulses bilaterally  Gastrointestinal: Positive bowel sounds, soft, nontender, nondistended  Musculoskeletal: No bilateral ankle edema  Psychiatric: Appropriate affect, cooperative  Neurologic: Oriented x 3, strength symmetric in all extremities, Cranial Nerves grossly intact to confrontation, speech clear  Skin: No rashes      Brief Assessment/Plan :  See detailed assessment and plan developed with APC which I have reviewed and/or edited for completeness.              Electronically signed by Carolann Miranda DO, 09/17/20, 2:43 AM EDT.            Electronically signed by Carolann Miranda DO at 09/17/20 0642          Emergency Department Notes      Deacon Amaral MD at 09/17/20 0002              EMERGENCY DEPARTMENT " ENCOUNTER      Pt Name: Sam Wilhelm  MRN: [id number]  YOB: 1960  Date of evaluation: 9/16/2020  Provider: Deacon Amaral MD    CHIEF COMPLAINT       Chief Complaint   Patient presents with   • Shortness of Breath         HISTORY OF PRESENT ILLNESS  (Location/Symptom, Timing/Onset, Context/Setting, Quality, Duration, Modifying Factors, Severity.)   Sam Wilhelm is a 60 y.o. male who presents to the emergency department with progressive shortness of breath for the past month that occurs with exertion and with lying flat at night.  Patient states that he has had to sit himself up in bed in order to sleep.  He denies any chest pain in association with this as well as any lower extremity edema.  He has no known medical problems.  He did state that the doctor attempted to put him on medications for cholesterol and blood pressure several years ago, however he does not believe that he had issues with this and so he has not been taking them.  He has no previous cardiac history. Patient denies any history of VTE as well as any recent surgery, hospitalization, long distance travel, swelling or pain in the lower extremities, or exogenous hormone use.  He denies any fever, chills, cough, or recent sick exposures.        Nursing notes were reviewed.    REVIEW OF SYSTEMS    (2-9 systems for level 4, 10 or more for level 5)   ROS:  General:  No fevers, no chills, no weakness  Cardiovascular:  No chest pain, no palpitations  Respiratory: Shortness of breath  Gastrointestinal:  No pain, no nausea, no vomiting, no diarrhea  Musculoskeletal:  No muscle pain, no joint pain  Skin:  No rash, no easy bruising  Neurologic:  No speech problems, no headache, no extremity numbness, no extremity tingling, no extremity weakness  Psychiatric:  No anxiety  Genitourinary:  No dysuria, no hematuria    Except as noted above the remainder of the review of systems was reviewed and negative.       PAST MEDICAL HISTORY    None    SURGICAL HISTORY       Past Surgical History:   Procedure Laterality Date   • HAND SURGERY           CURRENT MEDICATIONS       Current Facility-Administered Medications:   •  acetaminophen (TYLENOL) tablet 650 mg, 650 mg, Oral, Q4H PRN, Daria Sparrow PA-C  •  aspirin chewable tablet 324 mg, 324 mg, Oral, Once, Deacon Amaral MD  •  enoxaparin (LOVENOX) syringe 40 mg, 40 mg, Subcutaneous, Q AM, Daria Sparrow PA-C  •  [START ON 9/18/2020] thiamine (VITAMIN B-1) tablet 100 mg, 100 mg, Oral, Daily **AND** [START ON 9/18/2020] multivitamin (THERAGRAN) tablet 1 tablet, 1 tablet, Oral, Daily **AND** [START ON 9/18/2020] folic acid (FOLVITE) tablet 1 mg, 1 mg, Oral, Daily, Carolann Miranda DO  •  LORazepam (ATIVAN) tablet 1 mg, 1 mg, Oral, Q2H PRN **OR** LORazepam (ATIVAN) injection 1 mg, 1 mg, Intravenous, Q2H PRN **OR** LORazepam (ATIVAN) tablet 2 mg, 2 mg, Oral, Q1H PRN **OR** LORazepam (ATIVAN) injection 2 mg, 2 mg, Intravenous, Q1H PRN **OR** LORazepam (ATIVAN) injection 2 mg, 2 mg, Intravenous, Q15 Min PRN **OR** LORazepam (ATIVAN) injection 2 mg, 2 mg, Intramuscular, Q15 Min PRN **OR** LORazepam (ATIVAN) tablet 4 mg, 4 mg, Oral, Q1H PRN **OR** LORazepam (ATIVAN) injection 4 mg, 4 mg, Intravenous, Q1H PRN, Carolann Miranda DO  •  melatonin tablet 5 mg, 5 mg, Oral, Nightly PRN, Daria Sparrow PA-C  •  metoprolol succinate XL (TOPROL-XL) 24 hr tablet 12.5 mg, 12.5 mg, Oral, Nightly, Carolann Miranda DO  •  ondansetron (ZOFRAN) injection 4 mg, 4 mg, Intravenous, Q6H PRN, Daria Sparrow PA-C  •  sodium chloride 0.9 % flush 10 mL, 10 mL, Intravenous, PRN, Deacon Amaral MD  •  sodium chloride 0.9 % flush 10 mL, 10 mL, Intravenous, Q12H, Daria Sparrow PA-C  •  sodium chloride 0.9 % flush 10 mL, 10 mL, Intravenous, PRN, Daria Sparrow PA-C    ALLERGIES     Patient has no known allergies.       SOCIAL HISTORY       Social History     Socioeconomic History   • Marital status:       Spouse name: Not on file   • Number of children: Not on file   • Years of education: Not on file   • Highest education level: Not on file   Tobacco Use   • Smoking status: Former Smoker     Packs/day: 2.00     Types: Cigarettes     Quit date: 2020     Years since quittin.0   • Tobacco comment: QUIT LAST WEDNESDAY   Substance and Sexual Activity   • Alcohol use: Yes     Comment: 4-6 BEER NIGHTLY   • Drug use: Not Currently         PHYSICAL EXAM    (up to 7 for level 4, 8 or more for level 5)     Vitals:    20 0151 20 0220 20 0221 20 0230   BP:  (!) 135/123 (!) 137/122 (!) 142/108   BP Location:  Left arm     Patient Position:  Sitting     Pulse:  96 98 101   Resp: 16 18     Temp:  96.8 °F (36 °C)     TempSrc:  Oral     SpO2:  93% 93% 94%   Weight:       Height:           Physical Exam  General: Awake, alert, no acute distress.  HEENT: Conjunctiva normal.  Neck: Trachea midline.  Cardiac: Heart regular rate, rhythm, no murmurs, rubs, or gallops  Lungs: Lungs are clear to auscultation, there is no wheezing, rhonchi, or rales. There is no use of accessory muscles.  Chest wall: There is no tenderness to palpation over the chest wall or over ribs  Abdomen: Abdomen is soft, nontender, nondistended. There is no firm or pulsatile masses, no rebound rigidity or guarding.   Musculoskeletal: No deformity.  Neuro: Alert and oriented x 4.  Dermatology: Skin is warm and dry  Psych: Mentation is grossly normal, cognition is grossly normal. Affect is appropriate.      DIAGNOSTIC RESULTS     EKG: All EKG's are interpreted by the Emergency Department Physician who either signs or Co-signs this chart in the absence of a cardiologist.    Sinus rhythm rate of 100, LVH, no acute ischemic change    RADIOLOGY:   Non-plain film images such as CT, Ultrasound and MRI are read by the radiologist. Plain radiographic images are visualized and preliminarily interpreted by the emergency physician with the below  findings:      [x] Radiologist's Report Reviewed:  XR Chest 1 View   Final Result   Moderate congestive heart failure with interstitial edema bilaterally. Emphysema.      Signer Name: Enrique John MD    Signed: 9/17/2020 12:51 AM    Workstation Name: [id number]     [facility]      CT Angiogram Chest With & Without Contrast    (Results Pending)         ED BEDSIDE ULTRASOUND:   Performed by ED Physician -significantly reduced ejection fraction, left ventricular hypertrophy, pulmonary edema with bilateral B-lines    LABS:    I have reviewed and interpreted all of the currently available lab results from this visit (if applicable):  Results for orders placed or performed during the hospital encounter of 09/16/20   Comprehensive Metabolic Panel    Specimen: Blood   Result Value Ref Range    Glucose 92 65 - 99 mg/dL    BUN 18 6 - 20 mg/dL    Creatinine 1.21 0.76 - 1.27 mg/dL    Sodium 140 136 - 145 mmol/L    Potassium 3.9 3.5 - 5.2 mmol/L    Chloride 102 98 - 107 mmol/L    CO2 26.0 22.0 - 29.0 mmol/L    Calcium 9.2 8.6 - 10.5 mg/dL    Total Protein 6.5 6.0 - 8.5 g/dL    Albumin 4.00 3.50 - 5.20 g/dL    ALT (SGPT) 20 1 - 41 U/L    AST (SGOT) 31 1 - 40 U/L    Alkaline Phosphatase 89 39 - 117 U/L    Total Bilirubin 0.3 0.0 - 1.2 mg/dL    eGFR Non African Amer 62 >60 mL/min/1.73    Globulin 2.5 gm/dL    A/G Ratio 1.6 g/dL    BUN/Creatinine Ratio 14.9 7.0 - 25.0    Anion Gap 12.0 5.0 - 15.0 mmol/L   BNP    Specimen: Blood   Result Value Ref Range    proBNP 5,229.0 (H) 0.0 - 900.0 pg/mL   Troponin    Specimen: Blood   Result Value Ref Range    Troponin T <0.010 0.000 - 0.030 ng/mL   CBC Auto Differential    Specimen: Blood   Result Value Ref Range    WBC 10.50 3.40 - 10.80 10*3/mm3    RBC 4.36 4.14 - 5.80 10*6/mm3    Hemoglobin 15.2 13.0 - 17.7 g/dL    Hematocrit 44.6 37.5 - 51.0 %    .3 (H) 79.0 - 97.0 fL    MCH 34.9 (H) 26.6 - 33.0 pg    MCHC 34.1 31.5 - 35.7 g/dL    RDW 12.2 (L) 12.3 - 15.4  %    RDW-SD 46.0 37.0 - 54.0 fl    MPV 10.3 6.0 - 12.0 fL    Platelets 176 140 - 450 10*3/mm3    Neutrophil % 68.7 42.7 - 76.0 %    Lymphocyte % 18.7 (L) 19.6 - 45.3 %    Monocyte % 9.0 5.0 - 12.0 %    Eosinophil % 2.2 0.3 - 6.2 %    Basophil % 1.0 0.0 - 1.5 %    Immature Grans % 0.4 0.0 - 0.5 %    Neutrophils, Absolute 7.23 (H) 1.70 - 7.00 10*3/mm3    Lymphocytes, Absolute 1.96 0.70 - 3.10 10*3/mm3    Monocytes, Absolute 0.94 (H) 0.10 - 0.90 10*3/mm3    Eosinophils, Absolute 0.23 0.00 - 0.40 10*3/mm3    Basophils, Absolute 0.10 0.00 - 0.20 10*3/mm3    Immature Grans, Absolute 0.04 0.00 - 0.05 10*3/mm3    nRBC 0.0 0.0 - 0.2 /100 WBC   Light Blue Top   Result Value Ref Range    Extra Tube hold for add-on    Green Top (Gel)   Result Value Ref Range    Extra Tube Hold for add-ons.    Lavender Top   Result Value Ref Range    Extra Tube hold for add-on    Gold Top - SST   Result Value Ref Range    Extra Tube Hold for add-ons.         All other labs were within normal range or not returned as of this dictation.      EMERGENCY DEPARTMENT COURSE and DIFFERENTIAL DIAGNOSIS/MDM:   Vitals:    Vitals:    09/17/20 0151 09/17/20 0220 09/17/20 0221 09/17/20 0230   BP:  (!) 135/123 (!) 137/122 (!) 142/108   BP Location:  Left arm     Patient Position:  Sitting     Pulse:  96 98 101   Resp: 16 18     Temp:  96.8 °F (36 °C)     TempSrc:  Oral     SpO2:  93% 93% 94%   Weight:       Height:           ED Course as of Sep 17 0255   Thu Sep 17, 2020   0056 CXR personally reviewed and agree w/ radiologist interpretation.    [NS]   0057 Paging hospitalist and will provide lasix.    [NS]   0106 Spoke w/ Dr. Wagner who accepts pt for admission.    [NS]      ED Course User Index  [NS] Deacon Amaral MD       Patient well-appearing and stable throughout the duration of his stay in the emergency department.  History, work-up, bedside ultrasound consistent with new onset CHF as a cause for his dyspnea.  There is no evidence of infectious  component as patient has no cough, fever, or significant leukocytosis.  Laboratory evaluation demonstrates no evidence of significant electrolyte derangement or anemia.  ECG and troponin do not suggest acute myocardial ischemia.  Patient was provided with IV Lasix in the emergency department and will be admitted to the hospitalist service for further evaluation and care.    I had a discussion with the patient/family regarding diagnosis, diagnostic results, treatment plan, and medications.  The patient/family indicated understanding of these instructions.  I spent adequate time at the bedside proceeding discharge necessary to personally discuss the aftercare instructions, giving patient education, providing explanations of the results of our evaluations/findings, and my decision making to assure that the patient/family understand the plan of care.  Time was allotted to answer questions at that time and throughout the ED course.  Emphasis was placed on timely follow-up after discharge.  I also discussed the potential for the development of an acute emergent condition requiring further evaluation, admission, or even surgical intervention. I discussed that we found nothing during the visit today indicating the need for further workup, admission, or the presence of an unstable medical condition.  I encouraged the patient to return to the emergency department immediately for ANY concerns, worsening, new complaints, or if symptoms persist and unable to seek follow-up in a timely fashion.  The patient/family expressed understanding and agreement with this plan.  The patient will follow-up with their PCP in 1-2 days for reevaluation.       MEDICATIONS ADMINISTERED IN ED:  Medications   sodium chloride 0.9 % flush 10 mL (has no administration in time range)   aspirin chewable tablet 324 mg (324 mg Oral Not Given 9/17/20 0051)   sodium chloride 0.9 % flush 10 mL (has no administration in time range)   sodium chloride 0.9 %  flush 10 mL (has no administration in time range)   enoxaparin (LOVENOX) syringe 40 mg (has no administration in time range)   acetaminophen (TYLENOL) tablet 650 mg (has no administration in time range)   melatonin tablet 5 mg (has no administration in time range)   ondansetron (ZOFRAN) injection 4 mg (has no administration in time range)   LORazepam (ATIVAN) tablet 1 mg (has no administration in time range)     Or   LORazepam (ATIVAN) injection 1 mg (has no administration in time range)     Or   LORazepam (ATIVAN) tablet 2 mg (has no administration in time range)     Or   LORazepam (ATIVAN) injection 2 mg (has no administration in time range)     Or   LORazepam (ATIVAN) injection 2 mg (has no administration in time range)     Or   LORazepam (ATIVAN) injection 2 mg (has no administration in time range)     Or   LORazepam (ATIVAN) tablet 4 mg (has no administration in time range)     Or   LORazepam (ATIVAN) injection 4 mg (has no administration in time range)   thiamine (VITAMIN B-1) tablet 100 mg (has no administration in time range)     And   multivitamin (THERAGRAN) tablet 1 tablet (has no administration in time range)     And   folic acid (FOLVITE) tablet 1 mg (has no administration in time range)   metoprolol succinate XL (TOPROL-XL) 24 hr tablet 12.5 mg (has no administration in time range)   furosemide (LASIX) injection 40 mg (40 mg Intravenous Given 9/17/20 0103)           FINAL IMPRESSION      1. Acute combined systolic and diastolic congestive heart failure (CMS/HCC)    2. Essential hypertension          DISPOSITION/PLAN     ED Disposition     ED Disposition Condition Comment    Decision to Admit  Level of Care: Telemetry [5]   Diagnosis: Acute combined systolic and diastolic congestive heart failure (CMS/HCC) [159507]   Admitting Physician: LILIANE DUQUE [[id number]]   Attending Physician: LILIANE DUQUE [[id number]]   Bed Request Comments: 6A unless ruled out            PATIENT REFERRED TO:  No follow-up provider  specified.    DISCHARGE MEDICATIONS:     Medication List      ASK your doctor about these medications    ALEVE PM PO     VITAMIN B-12 IJ     VITAMIN C PO                Comment: Please note this report has been produced using speech recognition software.      Deacon Amaral MD  Attending Emergency Physician               Deacon Amaral MD  09/17/20 0257      Electronically signed by Deacon Amaral MD at 09/17/20 0257       Vital Signs (last day)     Date/Time   Temp   Temp src   Pulse   Resp   BP   Patient Position   SpO2    09/17/20 0900   --   --   63   --   --   --   --    09/17/20 0700   --   --   95   --   --   --   --    09/17/20 0655   97.3 (36.3)   Oral   92   16   (!) 140/106   Lying   --    09/17/20 0500   --   --   59   --   124/99   --   93    09/17/20 0430   --   --   80   --   (!) 139/109   --   (!) 88    09/17/20 0400   --   --   84   --   (!) 141/113   --   92    09/17/20 0300   --   --   73   --   (!) 143/105   --   94    09/17/20 0230   --   --   101   --   (!) 142/108   --   94    09/17/20 0221   --   --   98   --   (!) 137/122   --   93    09/17/20 0220   96.8 (36)   Oral   96   18   (!) 135/123   Sitting   93    09/17/20 01:51:35   --   --   --   16   --   --   --    09/17/20 0146   --   --   98   --   --   --   93    09/17/20 0130   --   --   80   --   --   --   95    09/17/20 0104   --   --   82   --   --   --   94    09/17/20 0103   --   --   88   --   145/92   --   --    09/17/20 0100   --   --   69   --   145/92   --   94    09/17/20 0054   --   --   89   --   --   --   94    09/17/20 0030   --   --   79   --   136/99   --   94    09/16/20 2358   98.4 (36.9)   Temporal   81   12   --   Sitting   96              CIWA (last day)     Date/Time CIWA-Ar Score    09/17/20 0800  0    09/17/20 0517  9    09/17/20 0300  9          Facility-Administered Medications as of 9/17/2020   Medication Dose Route Frequency Provider Last Rate Last Dose   • acetaminophen (TYLENOL) tablet 650 mg  650  mg Oral Q4H PRN Daria Sparrow PA-C       • aspirin chewable tablet 324 mg  324 mg Oral Once Deacon Amaral MD       • enoxaparin (LOVENOX) syringe 40 mg  40 mg Subcutaneous Q AM Daria Sparrow PA-C   40 mg at 09/17/20 0525   • multivitamin (THERAGRAN) tablet 1 tablet  1 tablet Oral Daily Carolann Miranda DO   1 tablet at 09/17/20 0318    And   • thiamine (VITAMIN B-1) tablet 100 mg  100 mg Oral Daily Carolann Miranda DO   100 mg at 09/17/20 0317    And   • folic acid (FOLVITE) tablet 1 mg  1 mg Oral Daily Carolann Miranda DO   1 mg at 09/17/20 0317   • [COMPLETED] furosemide (LASIX) injection 40 mg  40 mg Intravenous Once Deacon Amaral MD   40 mg at 09/17/20 0103   • [COMPLETED] furosemide (LASIX) injection 40 mg  40 mg Intravenous Once Hemalatha Ruelas MD   40 mg at 09/17/20 0827   • [COMPLETED] iopamidol (ISOVUE-370) 76 % injection 100 mL  100 mL Intravenous Once in imaging RuthCarolann smith DO   75 mL at 09/17/20 0336   • ipratropium-albuterol (DUO-NEB) nebulizer solution 3 mL  3 mL Nebulization 4x Daily - RT Carolann Miranda DO       • LORazepam (ATIVAN) tablet 1 mg  1 mg Oral Q2H PRN Carolann Miranda DO   1 mg at 09/17/20 0525    Or   • LORazepam (ATIVAN) injection 1 mg  1 mg Intravenous Q2H PRN Carolann Miranda DO        Or   • LORazepam (ATIVAN) tablet 2 mg  2 mg Oral Q1H PRN Carolann Miranda DO        Or   • LORazepam (ATIVAN) injection 2 mg  2 mg Intravenous Q1H PRN Carolann Miranda DO        Or   • LORazepam (ATIVAN) injection 2 mg  2 mg Intravenous Q15 Min PRN Carolann Miranda, DO        Or   • LORazepam (ATIVAN) injection 2 mg  2 mg Intramuscular Q15 Min PRN Carolann Miranda DO        Or   • LORazepam (ATIVAN) tablet 4 mg  4 mg Oral Q1H PRN Ruth, Carolann J, DO        Or   • LORazepam (ATIVAN) injection 4 mg  4 mg Intravenous Q1H PRN Carolann Miranda DO       • melatonin tablet 5 mg  5 mg Oral Nightly PRN Daria Sparrow PA-C       • metoprolol succinate XL (TOPROL-XL) 24 hr tablet 12.5 mg  12.5 mg Oral Nightly  Carolann Miranda, DO   12.5 mg at 09/17/20 0318   • ondansetron (ZOFRAN) injection 4 mg  4 mg Intravenous Q6H PRN Daria Sparrow PA-C       • Pharmacy Consult - MTM   Does not apply Daily Aldair Young [facility]       • sodium chloride 0.9 % flush 10 mL  10 mL Intravenous PRN Deacon Amaral MD       • sodium chloride 0.9 % flush 10 mL  10 mL Intravenous Q12H Daria Sparrow PA-C       • sodium chloride 0.9 % flush 10 mL  10 mL Intravenous PRN Daria Sparrow PA-C           Lab Results (last 24 hours)     Procedure Component Value Units Date/Time    Lipid Panel [[id number]] Collected: 09/17/20 0727    Specimen: Blood Updated: 09/17/20 0911    Troponin [[id number]]  (Normal) Collected: 09/17/20 0727    Specimen: Blood Updated: 09/17/20 0856     Troponin T <0.010 ng/mL     Narrative:      Troponin T Reference Range:  <= 0.03 ng/mL-   Negative for AMI  >0.03 ng/mL-     Abnormal for myocardial necrosis.  Clinicians would have to utilize clinical acumen, EKG, Troponin and serial changes to determine if it is an Acute Myocardial Infarction or myocardial injury due to an underlying chronic condition.       Results may be falsely decreased if patient taking Biotin.      Troponin [[id number]]  (Normal) Collected: 09/17/20 0429    Specimen: Blood Updated: 09/17/20 0548     Troponin T <0.010 ng/mL     Narrative:      Troponin T Reference Range:  <= 0.03 ng/mL-   Negative for AMI  >0.03 ng/mL-     Abnormal for myocardial necrosis.  Clinicians would have to utilize clinical acumen, EKG, Troponin and serial changes to determine if it is an Acute Myocardial Infarction or myocardial injury due to an underlying chronic condition.       Results may be falsely decreased if patient taking Biotin.      Basic Metabolic Panel [[id number]]  (Abnormal) Collected: 09/17/20 0429    Specimen: Blood Updated: 09/17/20 0548     Glucose 86 mg/dL      BUN 17 mg/dL      Creatinine 1.14 mg/dL      Sodium 138 mmol/L      Potassium 3.6 mmol/L       Chloride 97 mmol/L      CO2 28.0 mmol/L      Calcium 9.4 mg/dL      eGFR Non African Amer 66 mL/min/1.73      BUN/Creatinine Ratio 14.9     Anion Gap 13.0 mmol/L     Narrative:      GFR Normal >60  Chronic Kidney Disease <60  Kidney Failure <15      TSH [[id number]]  (Normal) Collected: 09/17/20 0429    Specimen: Blood Updated: 09/17/20 0545     TSH 2.600 uIU/mL     CBC & Differential [[id number]]  (Abnormal) Collected: 09/17/20 0429    Specimen: Blood Updated: 09/17/20 0513    Narrative:      The following orders were created for panel order CBC & Differential.  Procedure                               Abnormality         Status                     ---------                               -----------         ------                     CBC Auto Differential[[id number]]        Abnormal            Final result                 Please view results for these tests on the individual orders.    CBC Auto Differential [[id number]]  (Abnormal) Collected: 09/17/20 0429    Specimen: Blood Updated: 09/17/20 0513     WBC 9.48 10*3/mm3      RBC 4.46 10*6/mm3      Hemoglobin 15.6 g/dL      Hematocrit 45.4 %      .8 fL      MCH 35.0 pg      MCHC 34.4 g/dL      RDW 12.1 %      RDW-SD 45.6 fl      MPV 10.4 fL      Platelets 187 10*3/mm3      Neutrophil % 70.8 %      Lymphocyte % 18.2 %      Monocyte % 8.2 %      Eosinophil % 1.8 %      Basophil % 0.7 %      Immature Grans % 0.3 %      Neutrophils, Absolute 6.70 10*3/mm3      Lymphocytes, Absolute 1.73 10*3/mm3      Monocytes, Absolute 0.78 10*3/mm3      Eosinophils, Absolute 0.17 10*3/mm3      Basophils, Absolute 0.07 10*3/mm3      Immature Grans, Absolute 0.03 10*3/mm3      nRBC 0.0 /100 WBC     Respiratory Panel PCR w/COVID-19(SARS-CoV-2) KAMALA/JUDY/SANJIV/PAD/COR/MAD In-House, NP Swab in [facility], 3-4 HR TAT - Swab, Nasopharynx [[id number]]  (Normal) Collected: 09/17/20 0134    Specimen: Swab from Nasopharynx Updated: 09/17/20 0258     ADENOVIRUS, PCR Not Detected     Coronavirus 229E  Not Detected     Coronavirus HKU1 Not Detected     Coronavirus NL63 Not Detected     Coronavirus OC43 Not Detected     COVID19 Not Detected     Human Metapneumovirus Not Detected     Human Rhinovirus/Enterovirus Not Detected     Influenza A PCR Not Detected     Influenza A H1 Not Detected     Influenza A H1 2009 PCR Not Detected     Influenza A H3 Not Detected     Influenza B PCR Not Detected     Parainfluenza Virus 1 Not Detected     Parainfluenza Virus 2 Not Detected     Parainfluenza Virus 3 Not Detected     Parainfluenza Virus 4 Not Detected     RSV, PCR Not Detected     Bordetella pertussis pcr Not Detected     Bordetella parapertussis PCR Not Detected     Chlamydophila pneumoniae PCR Not Detected     Mycoplasma pneumo by PCR Not Detected    Narrative:      Fact sheet for providers: https://docs.[employer]/wp-content/uploads/ADF2731-6967-WT7.1-EUA-Provider-Fact-Sheet-3.pdf    Fact sheet for patients: https://docs.[employer]/wp-content/uploads/YOJ8439-0863-RU0.1-EUA-Patient-Fact-Sheet-1.pdf    Allyn Draw [[id number]] Collected: 09/17/20 0010    Specimen: Blood Updated: 09/17/20 0115    Narrative:      The following orders were created for panel order Allyn Draw.  Procedure                               Abnormality         Status                     ---------                               -----------         ------                     Light Blue Top[[id number]]                                   Final result               Green Top (Gel)[[id number]]                                  Final result               Lavender Top[[id number]]                                     Final result               Gold Top - SST[[id number]]                                   Final result                 Please view results for these tests on the individual orders.    Light Blue Top [[id number]] Collected: 09/17/20 0010    Specimen: Blood Updated: 09/17/20 0115     Extra Tube hold for add-on     Comment: Auto resulted       Green  Top (Gel) [[id number]] Collected: 09/17/20 0010    Specimen: Blood Updated: 09/17/20 0115     Extra Tube Hold for add-ons.     Comment: Auto resulted.       Lavender Top [[id number]] Collected: 09/17/20 0010    Specimen: Blood Updated: 09/17/20 0115     Extra Tube hold for add-on     Comment: Auto resulted       Gold Top - SST [[id number]] Collected: 09/17/20 0010    Specimen: Blood Updated: 09/17/20 0115     Extra Tube Hold for add-ons.     Comment: Auto resulted.       Comprehensive Metabolic Panel [[id number]] Collected: 09/17/20 0010    Specimen: Blood Updated: 09/17/20 0054     Glucose 92 mg/dL      BUN 18 mg/dL      Creatinine 1.21 mg/dL      Sodium 140 mmol/L      Potassium 3.9 mmol/L      Comment: Slight hemolysis detected by analyzer. Results may be affected.        Chloride 102 mmol/L      CO2 26.0 mmol/L      Calcium 9.2 mg/dL      Total Protein 6.5 g/dL      Albumin 4.00 g/dL      ALT (SGPT) 20 U/L      AST (SGOT) 31 U/L      Alkaline Phosphatase 89 U/L      Total Bilirubin 0.3 mg/dL      eGFR Non African Amer 62 mL/min/1.73      Globulin 2.5 gm/dL      A/G Ratio 1.6 g/dL      BUN/Creatinine Ratio 14.9     Anion Gap 12.0 mmol/L     Narrative:      GFR Normal >60  Chronic Kidney Disease <60  Kidney Failure <15      Troponin [[id number]]  (Normal) Collected: 09/17/20 0010    Specimen: Blood Updated: 09/17/20 0054     Troponin T <0.010 ng/mL     Narrative:      Troponin T Reference Range:  <= 0.03 ng/mL-   Negative for AMI  >0.03 ng/mL-     Abnormal for myocardial necrosis.  Clinicians would have to utilize clinical acumen, EKG, Troponin and serial changes to determine if it is an Acute Myocardial Infarction or myocardial injury due to an underlying chronic condition.       Results may be falsely decreased if patient taking Biotin.      BNP [[id number]]  (Abnormal) Collected: 09/17/20 0010    Specimen: Blood Updated: 09/17/20 0051     proBNP 5,229.0 pg/mL     Narrative:      Among patients with dyspnea, NT-proBNP  is highly sensitive for the detection of acute congestive heart failure. In addition NT-proBNP of <300 pg/ml effectively rules out acute congestive heart failure with 99% negative predictive value.    Results may be falsely decreased if patient taking Biotin.      CBC & Differential [[id number]]  (Abnormal) Collected: 09/17/20 0010    Specimen: Blood Updated: 09/17/20 0037    Narrative:      The following orders were created for panel order CBC & Differential.  Procedure                               Abnormality         Status                     ---------                               -----------         ------                     CBC Auto Differential[[id number]]        Abnormal            Final result                 Please view results for these tests on the individual orders.    CBC Auto Differential [[id number]]  (Abnormal) Collected: 09/17/20 0010    Specimen: Blood Updated: 09/17/20 0037     WBC 10.50 10*3/mm3      RBC 4.36 10*6/mm3      Hemoglobin 15.2 g/dL      Hematocrit 44.6 %      .3 fL      MCH 34.9 pg      MCHC 34.1 g/dL      RDW 12.2 %      RDW-SD 46.0 fl      MPV 10.3 fL      Platelets 176 10*3/mm3      Neutrophil % 68.7 %      Lymphocyte % 18.7 %      Monocyte % 9.0 %      Eosinophil % 2.2 %      Basophil % 1.0 %      Immature Grans % 0.4 %      Neutrophils, Absolute 7.23 10*3/mm3      Lymphocytes, Absolute 1.96 10*3/mm3      Monocytes, Absolute 0.94 10*3/mm3      Eosinophils, Absolute 0.23 10*3/mm3      Basophils, Absolute 0.10 10*3/mm3      Immature Grans, Absolute 0.04 10*3/mm3      nRBC 0.0 /100 WBC         Imaging Results (Last 24 Hours)     Procedure Component Value Units Date/Time    CT Angiogram Chest With & Without Contrast [[id number]] Collected: 09/17/20 0351     Updated: 09/17/20 0353    Narrative:      CT ANGIOGRAM CHEST W WO CONTRAST    INDICATION:   Progressive shortness of breath over the past month with congestive heart failure    TECHNIQUE:   CT angiogram of the chest with 100  cc of Isovue-300 IV contrast. 3-D reconstructions were obtained and reviewed.   Radiation dose reduction techniques included automated exposure control or exposure modulation based on body size. Count of known CT and  cardiac nuc med studies performed in previous 12 months: 0.     COMPARISON:   7/24/2012    FINDINGS:   Chest images at mediastinal window show no adenopathy. There are small bilateral pleural effusions. There is good opacification of the pulmonary arteries. No pulmonary artery filling defects are seen to suggest emboli. The CT angiographic images also  show no evidence of emboli.        Chest images at lung window show prominent pulmonary vascular markings with interlobular septal thickening more prominent at the bases than the apices. This is accompanied by peribronchial cuffing and edema. No focal areas of alveolar consolidation are  seen. Findings are consistent with congestive heart failure. Additionally, there is mild atelectasis at both lung bases posteriorly and there is linear scarring or atelectasis in the right middle lobe.      Impression:      There is moderate interstitial edema with bilateral pleural effusions consistent with congestive heart failure. No evidence of pulmonary emboli.    Signer Name: Enrique John MD   Signed: 9/17/2020 3:51 AM   Workstation Name: [id number]    [facility]    XR Chest 1 View [[id number]] Collected: 09/17/20 0051     Updated: 09/17/20 0053    Narrative:      CR Chest 1 Vw    INDICATION:   Mid chest pain with shortness of breath on arrival     COMPARISON:    None available.    FINDINGS:  Single portable AP view(s) of the chest.  Cardiomegaly is noted. The aorta is tortuous. In the lungs pulmonary vascular markings are diffusely prominent and there is diffuse interstitial edema with thickening of the interlobular septae bilaterally.  Findings suggest moderate congestive heart failure. No effusions are seen. No focal infiltrates are  noted.      Impression:      Moderate congestive heart failure with interstitial edema bilaterally. Emphysema.    Signer Name: Enrique John MD   Signed: 9/17/2020 12:51 AM   Workstation Name: [id number]    [facility]

## 2020-09-17 NOTE — ED PROVIDER NOTES
EMERGENCY DEPARTMENT ENCOUNTER      Pt Name: Sam Wilhelm  MRN: [id number]  YOB: 1960  Date of evaluation: 9/16/2020  Provider: Deacon Amaral MD    CHIEF COMPLAINT       Chief Complaint   Patient presents with   • Shortness of Breath         HISTORY OF PRESENT ILLNESS  (Location/Symptom, Timing/Onset, Context/Setting, Quality, Duration, Modifying Factors, Severity.)   Sam Wilhelm is a 60 y.o. male who presents to the emergency department with progressive shortness of breath for the past month that occurs with exertion and with lying flat at night.  Patient states that he has had to sit himself up in bed in order to sleep.  He denies any chest pain in association with this as well as any lower extremity edema.  He has no known medical problems.  He did state that the doctor attempted to put him on medications for cholesterol and blood pressure several years ago, however he does not believe that he had issues with this and so he has not been taking them.  He has no previous cardiac history. Patient denies any history of VTE as well as any recent surgery, hospitalization, long distance travel, swelling or pain in the lower extremities, or exogenous hormone use.  He denies any fever, chills, cough, or recent sick exposures.        Nursing notes were reviewed.    REVIEW OF SYSTEMS    (2-9 systems for level 4, 10 or more for level 5)   ROS:  General:  No fevers, no chills, no weakness  Cardiovascular:  No chest pain, no palpitations  Respiratory: Shortness of breath  Gastrointestinal:  No pain, no nausea, no vomiting, no diarrhea  Musculoskeletal:  No muscle pain, no joint pain  Skin:  No rash, no easy bruising  Neurologic:  No speech problems, no headache, no extremity numbness, no extremity tingling, no extremity weakness  Psychiatric:  No anxiety  Genitourinary:  No dysuria, no hematuria    Except as noted above the remainder of the review of systems was reviewed and negative.       PAST  MEDICAL HISTORY   None    SURGICAL HISTORY       Past Surgical History:   Procedure Laterality Date   • HAND SURGERY           CURRENT MEDICATIONS       Current Facility-Administered Medications:   •  acetaminophen (TYLENOL) tablet 650 mg, 650 mg, Oral, Q4H PRN, Daria Sparrow PA-C  •  aspirin chewable tablet 324 mg, 324 mg, Oral, Once, Deacon Amaral MD  •  enoxaparin (LOVENOX) syringe 40 mg, 40 mg, Subcutaneous, Q AM, Daria Sparrow PA-C  •  [START ON 9/18/2020] thiamine (VITAMIN B-1) tablet 100 mg, 100 mg, Oral, Daily **AND** [START ON 9/18/2020] multivitamin (THERAGRAN) tablet 1 tablet, 1 tablet, Oral, Daily **AND** [START ON 9/18/2020] folic acid (FOLVITE) tablet 1 mg, 1 mg, Oral, Daily, Carolann Miranda DO  •  LORazepam (ATIVAN) tablet 1 mg, 1 mg, Oral, Q2H PRN **OR** LORazepam (ATIVAN) injection 1 mg, 1 mg, Intravenous, Q2H PRN **OR** LORazepam (ATIVAN) tablet 2 mg, 2 mg, Oral, Q1H PRN **OR** LORazepam (ATIVAN) injection 2 mg, 2 mg, Intravenous, Q1H PRN **OR** LORazepam (ATIVAN) injection 2 mg, 2 mg, Intravenous, Q15 Min PRN **OR** LORazepam (ATIVAN) injection 2 mg, 2 mg, Intramuscular, Q15 Min PRN **OR** LORazepam (ATIVAN) tablet 4 mg, 4 mg, Oral, Q1H PRN **OR** LORazepam (ATIVAN) injection 4 mg, 4 mg, Intravenous, Q1H PRN, Carolann Miranda DO  •  melatonin tablet 5 mg, 5 mg, Oral, Nightly PRN, Daria Sparrow PA-C  •  metoprolol succinate XL (TOPROL-XL) 24 hr tablet 12.5 mg, 12.5 mg, Oral, Nightly, Carolann Miranda DO  •  ondansetron (ZOFRAN) injection 4 mg, 4 mg, Intravenous, Q6H PRN, Daria Sparrow PA-C  •  sodium chloride 0.9 % flush 10 mL, 10 mL, Intravenous, PRN, Deacon Amaral MD  •  sodium chloride 0.9 % flush 10 mL, 10 mL, Intravenous, Q12H, Daria Sparrow PA-C  •  sodium chloride 0.9 % flush 10 mL, 10 mL, Intravenous, PRN, Daria Sparrow PA-C    ALLERGIES     Patient has no known allergies.       SOCIAL HISTORY       Social History     Socioeconomic History   • Marital  status:      Spouse name: Not on file   • Number of children: Not on file   • Years of education: Not on file   • Highest education level: Not on file   Tobacco Use   • Smoking status: Former Smoker     Packs/day: 2.00     Types: Cigarettes     Quit date: 2020     Years since quittin.0   • Tobacco comment: QUIT LAST WEDNESDAY   Substance and Sexual Activity   • Alcohol use: Yes     Comment: 4-6 BEER NIGHTLY   • Drug use: Not Currently         PHYSICAL EXAM    (up to 7 for level 4, 8 or more for level 5)     Vitals:    20 0151 20 0220 20 0221 20 0230   BP:  (!) 135/123 (!) 137/122 (!) 142/108   BP Location:  Left arm     Patient Position:  Sitting     Pulse:  96 98 101   Resp: 16 18     Temp:  96.8 °F (36 °C)     TempSrc:  Oral     SpO2:  93% 93% 94%   Weight:       Height:           Physical Exam  General: Awake, alert, no acute distress.  HEENT: Conjunctiva normal.  Neck: Trachea midline.  Cardiac: Heart regular rate, rhythm, no murmurs, rubs, or gallops  Lungs: Lungs are clear to auscultation, there is no wheezing, rhonchi, or rales. There is no use of accessory muscles.  Chest wall: There is no tenderness to palpation over the chest wall or over ribs  Abdomen: Abdomen is soft, nontender, nondistended. There is no firm or pulsatile masses, no rebound rigidity or guarding.   Musculoskeletal: No deformity.  Neuro: Alert and oriented x 4.  Dermatology: Skin is warm and dry  Psych: Mentation is grossly normal, cognition is grossly normal. Affect is appropriate.      DIAGNOSTIC RESULTS     EKG: All EKG's are interpreted by the Emergency Department Physician who either signs or Co-signs this chart in the absence of a cardiologist.    Sinus rhythm rate of 100, LVH, no acute ischemic change    RADIOLOGY:   Non-plain film images such as CT, Ultrasound and MRI are read by the radiologist. Plain radiographic images are visualized and preliminarily interpreted by the emergency physician  with the below findings:      [x] Radiologist's Report Reviewed:  XR Chest 1 View   Final Result   Moderate congestive heart failure with interstitial edema bilaterally. Emphysema.      Signer Name: Enrique John MD    Signed: 9/17/2020 12:51 AM    Workstation Name: [id number]     [facility]      CT Angiogram Chest With & Without Contrast    (Results Pending)         ED BEDSIDE ULTRASOUND:   Performed by ED Physician -significantly reduced ejection fraction, left ventricular hypertrophy, pulmonary edema with bilateral B-lines    LABS:    I have reviewed and interpreted all of the currently available lab results from this visit (if applicable):  Results for orders placed or performed during the hospital encounter of 09/16/20   Comprehensive Metabolic Panel    Specimen: Blood   Result Value Ref Range    Glucose 92 65 - 99 mg/dL    BUN 18 6 - 20 mg/dL    Creatinine 1.21 0.76 - 1.27 mg/dL    Sodium 140 136 - 145 mmol/L    Potassium 3.9 3.5 - 5.2 mmol/L    Chloride 102 98 - 107 mmol/L    CO2 26.0 22.0 - 29.0 mmol/L    Calcium 9.2 8.6 - 10.5 mg/dL    Total Protein 6.5 6.0 - 8.5 g/dL    Albumin 4.00 3.50 - 5.20 g/dL    ALT (SGPT) 20 1 - 41 U/L    AST (SGOT) 31 1 - 40 U/L    Alkaline Phosphatase 89 39 - 117 U/L    Total Bilirubin 0.3 0.0 - 1.2 mg/dL    eGFR Non African Amer 62 >60 mL/min/1.73    Globulin 2.5 gm/dL    A/G Ratio 1.6 g/dL    BUN/Creatinine Ratio 14.9 7.0 - 25.0    Anion Gap 12.0 5.0 - 15.0 mmol/L   BNP    Specimen: Blood   Result Value Ref Range    proBNP 5,229.0 (H) 0.0 - 900.0 pg/mL   Troponin    Specimen: Blood   Result Value Ref Range    Troponin T <0.010 0.000 - 0.030 ng/mL   CBC Auto Differential    Specimen: Blood   Result Value Ref Range    WBC 10.50 3.40 - 10.80 10*3/mm3    RBC 4.36 4.14 - 5.80 10*6/mm3    Hemoglobin 15.2 13.0 - 17.7 g/dL    Hematocrit 44.6 37.5 - 51.0 %    .3 (H) 79.0 - 97.0 fL    MCH 34.9 (H) 26.6 - 33.0 pg    MCHC 34.1 31.5 - 35.7 g/dL    RDW 12.2  (L) 12.3 - 15.4 %    RDW-SD 46.0 37.0 - 54.0 fl    MPV 10.3 6.0 - 12.0 fL    Platelets 176 140 - 450 10*3/mm3    Neutrophil % 68.7 42.7 - 76.0 %    Lymphocyte % 18.7 (L) 19.6 - 45.3 %    Monocyte % 9.0 5.0 - 12.0 %    Eosinophil % 2.2 0.3 - 6.2 %    Basophil % 1.0 0.0 - 1.5 %    Immature Grans % 0.4 0.0 - 0.5 %    Neutrophils, Absolute 7.23 (H) 1.70 - 7.00 10*3/mm3    Lymphocytes, Absolute 1.96 0.70 - 3.10 10*3/mm3    Monocytes, Absolute 0.94 (H) 0.10 - 0.90 10*3/mm3    Eosinophils, Absolute 0.23 0.00 - 0.40 10*3/mm3    Basophils, Absolute 0.10 0.00 - 0.20 10*3/mm3    Immature Grans, Absolute 0.04 0.00 - 0.05 10*3/mm3    nRBC 0.0 0.0 - 0.2 /100 WBC   Light Blue Top   Result Value Ref Range    Extra Tube hold for add-on    Green Top (Gel)   Result Value Ref Range    Extra Tube Hold for add-ons.    Lavender Top   Result Value Ref Range    Extra Tube hold for add-on    Gold Top - SST   Result Value Ref Range    Extra Tube Hold for add-ons.         All other labs were within normal range or not returned as of this dictation.      EMERGENCY DEPARTMENT COURSE and DIFFERENTIAL DIAGNOSIS/MDM:   Vitals:    Vitals:    09/17/20 0151 09/17/20 0220 09/17/20 0221 09/17/20 0230   BP:  (!) 135/123 (!) 137/122 (!) 142/108   BP Location:  Left arm     Patient Position:  Sitting     Pulse:  96 98 101   Resp: 16 18     Temp:  96.8 °F (36 °C)     TempSrc:  Oral     SpO2:  93% 93% 94%   Weight:       Height:           ED Course as of Sep 17 0255   Thu Sep 17, 2020   0056 CXR personally reviewed and agree w/ radiologist interpretation.    [NS]   0057 Paging hospitalist and will provide lasix.    [NS]   0106 Spoke w/ Dr. Wagner who accepts pt for admission.    [NS]      ED Course User Index  [NS] Deacon Amaral MD       Patient well-appearing and stable throughout the duration of his stay in the emergency department.  History, work-up, bedside ultrasound consistent with new onset CHF as a cause for his dyspnea.  There is no evidence  of infectious component as patient has no cough, fever, or significant leukocytosis.  Laboratory evaluation demonstrates no evidence of significant electrolyte derangement or anemia.  ECG and troponin do not suggest acute myocardial ischemia.  Patient was provided with IV Lasix in the emergency department and will be admitted to the hospitalist service for further evaluation and care.    I had a discussion with the patient/family regarding diagnosis, diagnostic results, treatment plan, and medications.  The patient/family indicated understanding of these instructions.  I spent adequate time at the bedside proceeding discharge necessary to personally discuss the aftercare instructions, giving patient education, providing explanations of the results of our evaluations/findings, and my decision making to assure that the patient/family understand the plan of care.  Time was allotted to answer questions at that time and throughout the ED course.  Emphasis was placed on timely follow-up after discharge.  I also discussed the potential for the development of an acute emergent condition requiring further evaluation, admission, or even surgical intervention. I discussed that we found nothing during the visit today indicating the need for further workup, admission, or the presence of an unstable medical condition.  I encouraged the patient to return to the emergency department immediately for ANY concerns, worsening, new complaints, or if symptoms persist and unable to seek follow-up in a timely fashion.  The patient/family expressed understanding and agreement with this plan.  The patient will follow-up with their PCP in 1-2 days for reevaluation.       MEDICATIONS ADMINISTERED IN ED:  Medications   sodium chloride 0.9 % flush 10 mL (has no administration in time range)   aspirin chewable tablet 324 mg (324 mg Oral Not Given 9/17/20 0051)   sodium chloride 0.9 % flush 10 mL (has no administration in time range)   sodium  chloride 0.9 % flush 10 mL (has no administration in time range)   enoxaparin (LOVENOX) syringe 40 mg (has no administration in time range)   acetaminophen (TYLENOL) tablet 650 mg (has no administration in time range)   melatonin tablet 5 mg (has no administration in time range)   ondansetron (ZOFRAN) injection 4 mg (has no administration in time range)   LORazepam (ATIVAN) tablet 1 mg (has no administration in time range)     Or   LORazepam (ATIVAN) injection 1 mg (has no administration in time range)     Or   LORazepam (ATIVAN) tablet 2 mg (has no administration in time range)     Or   LORazepam (ATIVAN) injection 2 mg (has no administration in time range)     Or   LORazepam (ATIVAN) injection 2 mg (has no administration in time range)     Or   LORazepam (ATIVAN) injection 2 mg (has no administration in time range)     Or   LORazepam (ATIVAN) tablet 4 mg (has no administration in time range)     Or   LORazepam (ATIVAN) injection 4 mg (has no administration in time range)   thiamine (VITAMIN B-1) tablet 100 mg (has no administration in time range)     And   multivitamin (THERAGRAN) tablet 1 tablet (has no administration in time range)     And   folic acid (FOLVITE) tablet 1 mg (has no administration in time range)   metoprolol succinate XL (TOPROL-XL) 24 hr tablet 12.5 mg (has no administration in time range)   furosemide (LASIX) injection 40 mg (40 mg Intravenous Given 9/17/20 0103)           FINAL IMPRESSION      1. Acute combined systolic and diastolic congestive heart failure (CMS/HCC)    2. Essential hypertension          DISPOSITION/PLAN     ED Disposition     ED Disposition Condition Comment    Decision to Admit  Level of Care: Telemetry [5]   Diagnosis: Acute combined systolic and diastolic congestive heart failure (CMS/HCC) [661315]   Admitting Physician: LILIANE DUQUE [[id number]]   Attending Physician: LILIANE DUQUE [[id number]]   Bed Request Comments: 6A unless ruled out            PATIENT REFERRED TO:  No  follow-up provider specified.    DISCHARGE MEDICATIONS:     Medication List      ASK your doctor about these medications    ALEVE PM PO     VITAMIN B-12 IJ     VITAMIN C PO                Comment: Please note this report has been produced using speech recognition software.      Deacon Amaral MD  Attending Emergency Physician               Deacon Amaral MD  09/17/20 0252

## 2020-09-17 NOTE — CONSULTS
[facility] Consult Note      Patient Care Team:  Steve Bhatia MD as PCP - General (Family Medicine)  Steve Bhatia MD  No ref. provider found    Subjective     History of Present Illness: Mr. Wilhelm is a pleasant 60-year-old male with a history of hypertension, tobacco abuse, and EtOH abuse.  He is a previous 2 pack/day smoker having quit last Wednesday.  He also says that he drinks 4-6 beers every night.  He is complaining of a several month history of increasing shortness of breath and dyspnea on exertion as well as chest pain which he describes as a tightness.  He says often at work he has to stop what he is doing because of his chest tightness and shortness of breath.  He says when he when he rests his symptoms subside.  He went to his primary care provider yesterday and they darlene some lab work and apparently his troponin came back elevated and they called him and told him to come to the ED.  Here his troponins are negative x3 and his EKG reveals normal sinus rhythm with LVH.  He has been receiving IV Lasix and is currently breathing much better.      Current Facility-Administered Medications:   •  acetaminophen (TYLENOL) tablet 650 mg, 650 mg, Oral, Q4H PRN, Daria Sparrow PA-C  •  aspirin chewable tablet 324 mg, 324 mg, Oral, Once, Deacon Amaral MD  •  enoxaparin (LOVENOX) syringe 40 mg, 40 mg, Subcutaneous, Q AM, Daria Sparrow PA-C, 40 mg at 09/17/20 0525  •  thiamine (VITAMIN B-1) tablet 100 mg, 100 mg, Oral, Daily, 100 mg at 09/17/20 0317 **AND** multivitamin (THERAGRAN) tablet 1 tablet, 1 tablet, Oral, Daily, 1 tablet at 09/17/20 0318 **AND** folic acid (FOLVITE) tablet 1 mg, 1 mg, Oral, Daily, Carolann Miranda DO, 1 mg at 09/17/20 0317  •  ipratropium-albuterol (DUO-NEB) nebulizer solution 3 mL, 3 mL, Nebulization, 4x Daily - RT, Carolann Miranda DO  •  LORazepam (ATIVAN) tablet 1 mg, 1 mg, Oral, Q2H PRN, 1 mg at 09/17/20  0525 **OR** LORazepam (ATIVAN) injection 1 mg, 1 mg, Intravenous, Q2H PRN **OR** LORazepam (ATIVAN) tablet 2 mg, 2 mg, Oral, Q1H PRN **OR** LORazepam (ATIVAN) injection 2 mg, 2 mg, Intravenous, Q1H PRN **OR** LORazepam (ATIVAN) injection 2 mg, 2 mg, Intravenous, Q15 Min PRN **OR** LORazepam (ATIVAN) injection 2 mg, 2 mg, Intramuscular, Q15 Min PRN **OR** LORazepam (ATIVAN) tablet 4 mg, 4 mg, Oral, Q1H PRN **OR** LORazepam (ATIVAN) injection 4 mg, 4 mg, Intravenous, Q1H PRN, Carolann Miranda DO  •  melatonin tablet 5 mg, 5 mg, Oral, Nightly PRN, Daria Sparrow PA-C  •  metoprolol succinate XL (TOPROL-XL) 24 hr tablet 12.5 mg, 12.5 mg, Oral, Nightly, Carolann Miranda DO, 12.5 mg at 20 0318  •  ondansetron (ZOFRAN) injection 4 mg, 4 mg, Intravenous, Q6H PRN, Daria Sparrow PA-C  •  Pharmacy Consult - [facility], , Does not apply, Daily, Aldair Young, [facility]  •  sodium chloride 0.9 % flush 10 mL, 10 mL, Intravenous, PRN, Deacon Amaral MD  •  sodium chloride 0.9 % flush 10 mL, 10 mL, Intravenous, Q12H, Daria Sparrow PA-C  •  sodium chloride 0.9 % flush 10 mL, 10 mL, Intravenous, PRN, Daria Sparrow PA-C    Social History     Socioeconomic History   • Marital status:      Spouse name: Not on file   • Number of children: Not on file   • Years of education: Not on file   • Highest education level: Not on file   Tobacco Use   • Smoking status: Former Smoker     Packs/day: 2.00     Types: Cigarettes     Quit date: 2020     Years since quittin.0   • Tobacco comment: QUIT LAST WEDNESDAY   Substance and Sexual Activity   • Alcohol use: Yes     Comment: 4-6 BEER NIGHTLY   • Drug use: Not Currently   Social History Narrative    Lives alone. Cares for mother who has heart failure.        Family History   Problem Relation Age of Onset   • Dementia Mother    • Heart disease Father    • Stroke Father        Review of Systems   Constitutional: Positive for fatigue.   HENT: Negative.    Eyes:  Negative.    Respiratory: Positive for shortness of breath.    Cardiovascular: Positive for chest pain.   Gastrointestinal: Negative.    Endocrine: Negative.    Genitourinary: Negative.    Musculoskeletal: Negative.    Skin: Negative.    Allergic/Immunologic: Negative.    Neurological: Negative.    Hematological: Negative.    Psychiatric/Behavioral: Negative.           Objective     Vital Signs  Temp:  [96.8 °F (36 °C)-98.4 °F (36.9 °C)] 97.3 °F (36.3 °C)  Heart Rate:  [] 95  Resp:  [12-18] 16  BP: (124-145)/() 140/106      Intake/Output Summary (Last 24 hours) at 9/17/2020 0902  Last data filed at 9/17/2020 0528  Gross per 24 hour   Intake --   Output 2100 ml   Net -2100 ml     No intake/output data recorded.    Constitutional:       Appearance: Not in distress.   Eyes:      Conjunctiva/sclera: Conjunctivae normal.      Pupils: Pupils are equal, round, and reactive to light.   HENT:      Nose: Nose normal.    Mouth/Throat:      Pharynx: Oropharynx is clear.   Neck:      Musculoskeletal: Normal range of motion and neck supple.      Thyroid: Thyroid normal.      Vascular: JVD elevated.   Pulmonary:      Effort: Pulmonary effort is normal.      Breath sounds: Normal breath sounds.   Chest:      Chest wall: Not tender to palpatation.   Cardiovascular:      Normal rate. Regular rhythm.      Murmurs: There is no murmur.      No gallop. No click.   Abdominal:      General: Bowel sounds are normal. There is no distension.      Palpations: Abdomen is soft.      Tenderness: There is no abdominal tenderness.   Musculoskeletal: Normal range of motion.   Skin:     General: Skin is warm and dry.   Neurological:      Mental Status: Alert and oriented to person, place and time.           Results Review:    I reviewed the patient's new clinical results.    WBC WBC   Date/Time Value Ref Range Status   09/17/2020 0429 9.48 3.40 - 10.80 10*3/mm3 Final   09/17/2020 0010 10.50 3.40 - 10.80 10*3/mm3 Final      HGB Hemoglobin    Date/Time Value Ref Range Status   09/17/2020 0429 15.6 13.0 - 17.7 g/dL Final   09/17/2020 0010 15.2 13.0 - 17.7 g/dL Final      HCT Hematocrit   Date/Time Value Ref Range Status   09/17/2020 0429 45.4 37.5 - 51.0 % Final   09/17/2020 0010 44.6 37.5 - 51.0 % Final      Platelets Platelets   Date/Time Value Ref Range Status   09/17/2020 0429 187 140 - 450 10*3/mm3 Final   09/17/2020 0010 176 140 - 450 10*3/mm3 Final        PT/INR:    No results found for: PROTIME, INR    Sodium Sodium   Date/Time Value Ref Range Status   09/17/2020 0429 138 136 - 145 mmol/L Final   09/17/2020 0010 140 136 - 145 mmol/L Final      Potassium Potassium   Date/Time Value Ref Range Status   09/17/2020 0429 3.6 3.5 - 5.2 mmol/L Final   09/17/2020 0010 3.9 3.5 - 5.2 mmol/L Final     Comment:     Slight hemolysis detected by analyzer. Results may be affected.      Chloride Chloride   Date/Time Value Ref Range Status   09/17/2020 0429 97 (L) 98 - 107 mmol/L Final   09/17/2020 0010 102 98 - 107 mmol/L Final      Bicarbonate No results found for: PLASMABICARB   BUN BUN   Date/Time Value Ref Range Status   09/17/2020 0429 17 6 - 20 mg/dL Final   09/17/2020 0010 18 6 - 20 mg/dL Final      Creatinine Creatinine   Date/Time Value Ref Range Status   09/17/2020 0429 1.14 0.76 - 1.27 mg/dL Final   09/17/2020 0010 1.21 0.76 - 1.27 mg/dL Final      Calcium Calcium   Date/Time Value Ref Range Status   09/17/2020 0429 9.4 8.6 - 10.5 mg/dL Final   09/17/2020 0010 9.2 8.6 - 10.5 mg/dL Final      Magnesium @RESULFAST(MG:3)@   Troponin       No results found for: TROPONINI                EKG: normal sinus rhythm, LVH.      Patient Active Problem List   Diagnosis   • Acute combined systolic and diastolic congestive heart failure (CMS/HCC)   • Essential hypertension   • Macrocytosis without anemia   • Alcoholism (CMS/HCC)   • Tobacco abuse       Assessment/Plan   60-year-old male with hypertension, heavy tobacco abuse-quit last week, and EtOH  abuse.    Continue diuresis  Echocardiogram to assess LV systolic function  Left heart catheterization with possible intervention in a.m.      I discussed the patient's findings and my recommendations with patient and family     MD Andre Whiting PA  09/17/20  09:02 EDT

## 2020-09-17 NOTE — H&P
[facility] Medicine Services  HISTORY AND PHYSICAL    Patient Name: Sam Wilhelm  : 1960  MRN: [id number]  Primary Care Physician: Steve Bhatia MD  Date of admission: 2020      Subjective   Subjective     Chief Complaint:  SOB    HPI:  Sam Wilhelm is a 60 y.o. male with a past medical history significant for hypertension. He presents today with complaints of progressively worsening SOB going on for the past month. Dyspnea worse with exertion and lying flat. Acutely worse today with some weakness which prompted his visit to ED. Patient reports smoking 2 PPD for 35 years. States he quit last Wednesday. Also reports drinking approximately 6 beers nightly. He is not on any chronic medications and denies history of CAD, COPD, CHF, or DVT/PE. Also denies cough, congestion, fever, chest pain, syncope, or lower extremity edema. No known exposure to COVID-19. Will admit to inpatient.    Emergency Department Evaluation; /123. Vitals otherwise stable. Troponin negative. BNP 5200. .3. chemistry and hematology otherwise favorable. CXR demonstrates acute congestive heart failure with interstitial edema bilaterally. EKG normal sinus      COVID-19 RISK SCREEN     1. Has the patient had close contact without PPE with a lab confirmed COVID-19 (+) person or a person under investigation (PUI) for COVID-19 infection?  -- No     2. Has the patient had respiratory symptoms, worsened/new cough and/or SOA, unexplained fever, or sudden loss of smell and/or taste in the past 7 days? --  Yes    3. Does the patient have baseline higher exposure risk such as working in healthcare field, currently residing in healthcare facility, or ongoing hemodialysis?  --  No         Review of Systems   Constitutional: Negative for chills, diaphoresis, fatigue and fever.   HENT: Negative for congestion and trouble swallowing.    Eyes: Positive for discharge. Negative for photophobia and  visual disturbance.   Respiratory: Positive for shortness of breath and wheezing. Negative for cough.    Cardiovascular: Positive for chest pain (stinging pain in mid sternal region. random, lasts seconds. ). Negative for palpitations and leg swelling.   Gastrointestinal: Negative for abdominal pain, diarrhea, nausea and vomiting.   Endocrine: Negative for cold intolerance and heat intolerance.   Genitourinary: Negative for dysuria and flank pain.   Musculoskeletal: Negative for back pain and gait problem.   Skin: Negative for pallor and rash.   Allergic/Immunologic: Negative for immunocompromised state.   Neurological: Positive for weakness. Negative for dizziness and headaches.   Hematological: Negative for adenopathy.   Psychiatric/Behavioral: Negative for agitation and confusion.        All other systems reviewed and are negative.     Personal History     Past Medical History:   Diagnosis Date   • B12 deficiency    • Essential hypertension 9/17/2020       Past Surgical History:   Procedure Laterality Date   • HAND SURGERY         Family History: family history includes Dementia in his mother; Heart disease in his father; Stroke in his father. Otherwise pertinent FHx was reviewed and unremarkable.     Social History:  reports that he quit smoking 8 days ago. His smoking use included cigarettes. He smoked 2.00 packs per day. He does not have any smokeless tobacco history on file. He reports current alcohol use. He reports previous drug use.  Social History     Social History Narrative    Lives alone. Cares for mother who has heart failure.        Medications:  Available home medication information reviewed.  Medications Prior to Admission   Medication Sig Dispense Refill Last Dose   • Ascorbic Acid (VITAMIN C PO) Take  by mouth.    at Unknown time   • Cyanocobalamin (VITAMIN B-12 IJ) Inject  as directed Every 30 (Thirty) Days.      • Naproxen Sod-diphenhydrAMINE (ALEVE PM PO) Take  by mouth Every Night.                 No Known Allergies    Objective   Objective     Vital Signs:   Temp:  [98.4 °F (36.9 °C)] 98.4 °F (36.9 °C)  Heart Rate:  [69-98] 98  Resp:  [12-16] 16  BP: (136-145)/(92-99) 145/92        Physical Exam   Constitutional: Awake, alert  Eyes: PERRLA, sclerae anicteric, no conjunctival injection  HENT: NCAT, mucous membranes moist  Neck: Supple, no thyromegaly, no lymphadenopathy, trachea midline  Respiratory: rales noted Bilaterally in bases, nonlabored respirations   Cardiovascular: RRR, no murmurs, rubs, or gallops, palpable pedal pulses bilaterally  Gastrointestinal: Positive bowel sounds, soft, nontender, nondistended  Musculoskeletal: No bilateral ankle edema, no clubbing or cyanosis to extremities  Psychiatric: Appropriate affect, cooperative  Neurologic: Oriented x 3, strength symmetric in all extremities, Cranial Nerves grossly intact to confrontation, speech clear  Skin: No rashes      Results Reviewed:  I have personally reviewed current lab and radiology data.    Results from last 7 days   Lab Units 09/17/20  0010   WBC 10*3/mm3 10.50   HEMOGLOBIN g/dL 15.2   HEMATOCRIT % 44.6   PLATELETS 10*3/mm3 176     Results from last 7 days   Lab Units 09/17/20  0010   SODIUM mmol/L 140   POTASSIUM mmol/L 3.9   CHLORIDE mmol/L 102   CO2 mmol/L 26.0   BUN mg/dL 18   CREATININE mg/dL 1.21   GLUCOSE mg/dL 92   CALCIUM mg/dL 9.2   ALT (SGPT) U/L 20   AST (SGOT) U/L 31   TROPONIN T ng/mL <0.010   PROBNP pg/mL 5,229.0*     Estimated Creatinine Clearance: 59.8 mL/min (by C-G formula based on SCr of 1.21 mg/dL).  Brief Urine Lab Results     None        Imaging Results (Last 24 Hours)     Procedure Component Value Units Date/Time    XR Chest 1 View [[id number]] Collected: 09/17/20 0051     Updated: 09/17/20 0053    Narrative:      CR Chest 1 Vw    INDICATION:   Mid chest pain with shortness of breath on arrival     COMPARISON:    None available.    FINDINGS:  Single portable AP view(s) of the chest.  Cardiomegaly is  noted. The aorta is tortuous. In the lungs pulmonary vascular markings are diffusely prominent and there is diffuse interstitial edema with thickening of the interlobular septae bilaterally.  Findings suggest moderate congestive heart failure. No effusions are seen. No focal infiltrates are noted.      Impression:      Moderate congestive heart failure with interstitial edema bilaterally. Emphysema.    Signer Name: Enrique John MD   Signed: 9/17/2020 12:51 AM   Workstation Name: [facility]    [facility]             Assessment/Plan   Assessment & Plan     Active Hospital Problems    Diagnosis POA   • **Acute combined systolic and diastolic congestive heart failure (CMS/HCC) [I50.41] Yes     Priority: High   • Essential hypertension [I10] Yes     Priority: Medium   • Macrocytosis without anemia [D75.89] Yes     Priority: Medium   • Alcoholism (CMS/HCC) [F10.20] Yes     Priority: Medium   • Tobacco abuse [Z72.0] Yes     Priority: Low       1. Acute Heart Failure:  - new onset. Bedside echocardiogram demonstrated markedly reduced EF  - administered 40 mg lasix in ED  - troponin negative. Continue to trend cardiac enzymes  - strict I&O, daily weights  - obtain echocardiogram  - check TSH  - consult to cardiology  - am labs  - Get stat CTA of chest     2. Hypertension:  - not treated. Currently stable.  - PRN meds as needed  -start low dose metoprolol succinate     3. Alcoholism:  - CIWA protocol with as needed Ativan  - oral vitamin replacement  - check b12, folate    4. Tobacco abuse:  - long time heavy smoker. Quit 1 week ago.  - nicotine patch as needed    DVT prophylaxis: lovenox      CODE STATUS:  Full code  Code Status and Medical Interventions:   Ordered at: 09/17/20 0115     Level Of Support Discussed With:    Patient     Code Status:    CPR     Medical Interventions (Level of Support Prior to Arrest):    Full       Admission Status:  I believe this patient meets INPATIENT status due to  "SOB.  I feel patient’s risk for adverse outcomes and need for care warrant INPATIENT evaluation and I predict the patient’s care encounter to likely last beyond 2 midnights.    Electronically signed by Daria Sparrow PA-C, 09/17/20, 2:33 AM EDT.      Attending   Admission Attestation       I have seen and examined the patient, performing an independent face-to-face diagnostic evaluation with plan of care reviewed and developed with the advanced practice clinician (APC).      Brief Summary Statement:   Sam Wilhelm is a 60 y.o. male with past medical history COPD, HTN and B12 deficiency.  Patient presented to [facility] ED with worsening shortness of breath for the past month.  States that he has felt very anxious and panicked due to the shortness of breath.  He went to see his PCP in [place] who recommended outpatient testing.  After results were obtained patient was advised to go to the ED and decided to come to [facility] ED as he is followed by Dr. Monroy.  Patient was noted in the ED to have a elevated proBNP of 5,229.  Chest x-ray noted \"Moderate congestive heart failure with interstitial edema bilaterally. Emphysema.\"  Therefore the decision was made to admit patient to [facility] for IV Lasix and further evaluation of CHF.    Remainder of detailed HPI is as noted by APC and has been reviewed and/or edited by me for completeness.    Attending Physical Exam:  Constitutional: No acute distress, awake, alert  HENT: NCAT, mucous membranes moist  Respiratory: Rales noted bilaterally in lung bases, respiratory effort normal   Cardiovascular: RRR, no murmurs, rubs, or gallops, palpable pedal pulses bilaterally  Gastrointestinal: Positive bowel sounds, soft, nontender, nondistended  Musculoskeletal: No bilateral ankle edema  Psychiatric: Appropriate affect, cooperative  Neurologic: Oriented x 3, strength symmetric in all extremities, Cranial Nerves grossly intact to confrontation, speech clear  Skin: No rashes      Brief " Assessment/Plan :  See detailed assessment and plan developed with APC which I have reviewed and/or edited for completeness.              Electronically signed by Carolann Miranda DO, 09/17/20, 2:43 AM EDT.

## 2020-09-17 NOTE — PLAN OF CARE
Problem: Adult Inpatient Plan of Care  Goal: Plan of Care Review  Outcome: Met  >: VSS with the exception of BP. MD made aware and added Metoprolol. NSR to ST via telemetry. Tolerating RA with mild dyspnea noted at rest and when OOB. Otherwise, denies pain.  >: Currently UAL within room. Independent with ADL's. Denies any use of assistive devices at home.   >: Up to date on POC. Given x1 dose of IV Lasix. Instructed to utilize urinal for adequate measurement of output. Last BM today.   >: No additional needs or concerns currently. Reports Huma (friend) will transport patient home at discharge. Continuing to monitor.       Raciel Peters RN  02:53 EDT  09/17/20

## 2020-09-18 PROBLEM — N28.9 RENAL INSUFFICIENCY: Status: ACTIVE | Noted: 2020-09-18

## 2020-09-18 LAB
ANION GAP SERPL CALCULATED.3IONS-SCNC: 12 MMOL/L (ref 5–15)
BUN SERPL-MCNC: 24 MG/DL (ref 6–20)
BUN/CREAT SERPL: 18.6 (ref 7–25)
CALCIUM SPEC-SCNC: 9.2 MG/DL (ref 8.6–10.5)
CHLORIDE SERPL-SCNC: 94 MMOL/L (ref 98–107)
CO2 SERPL-SCNC: 28 MMOL/L (ref 22–29)
CREAT SERPL-MCNC: 1.29 MG/DL (ref 0.76–1.27)
DEPRECATED RDW RBC AUTO: 45.1 FL (ref 37–54)
ERYTHROCYTE [DISTWIDTH] IN BLOOD BY AUTOMATED COUNT: 12 % (ref 12.3–15.4)
GFR SERPL CREATININE-BSD FRML MDRD: 57 ML/MIN/1.73
GLUCOSE SERPL-MCNC: 128 MG/DL (ref 65–99)
HCT VFR BLD AUTO: 52.2 % (ref 37.5–51)
HGB BLD-MCNC: 17.8 G/DL (ref 13–17.7)
MCH RBC QN AUTO: 34.8 PG (ref 26.6–33)
MCHC RBC AUTO-ENTMCNC: 34.1 G/DL (ref 31.5–35.7)
MCV RBC AUTO: 102 FL (ref 79–97)
PLATELET # BLD AUTO: 212 10*3/MM3 (ref 140–450)
PMV BLD AUTO: 10.2 FL (ref 6–12)
POTASSIUM SERPL-SCNC: 3.3 MMOL/L (ref 3.5–5.2)
RBC # BLD AUTO: 5.12 10*6/MM3 (ref 4.14–5.8)
SODIUM SERPL-SCNC: 134 MMOL/L (ref 136–145)
WBC # BLD AUTO: 10.89 10*3/MM3 (ref 3.4–10.8)

## 2020-09-18 PROCEDURE — 0 IOPAMIDOL PER 1 ML: Performed by: INTERNAL MEDICINE

## 2020-09-18 PROCEDURE — B2111ZZ FLUOROSCOPY OF MULTIPLE CORONARY ARTERIES USING LOW OSMOLAR CONTRAST: ICD-10-PCS | Performed by: INTERNAL MEDICINE

## 2020-09-18 PROCEDURE — 94799 UNLISTED PULMONARY SVC/PX: CPT

## 2020-09-18 PROCEDURE — 4A023N7 MEASUREMENT OF CARDIAC SAMPLING AND PRESSURE, LEFT HEART, PERCUTANEOUS APPROACH: ICD-10-PCS | Performed by: INTERNAL MEDICINE

## 2020-09-18 PROCEDURE — 94760 N-INVAS EAR/PLS OXIMETRY 1: CPT

## 2020-09-18 PROCEDURE — 80048 BASIC METABOLIC PNL TOTAL CA: CPT | Performed by: HOSPITALIST

## 2020-09-18 PROCEDURE — 93458 L HRT ARTERY/VENTRICLE ANGIO: CPT | Performed by: INTERNAL MEDICINE

## 2020-09-18 PROCEDURE — 25010000002 FENTANYL CITRATE (PF) 100 MCG/2ML SOLUTION: Performed by: INTERNAL MEDICINE

## 2020-09-18 PROCEDURE — 85027 COMPLETE CBC AUTOMATED: CPT | Performed by: HOSPITALIST

## 2020-09-18 PROCEDURE — 25010000002 ENOXAPARIN PER 10 MG: Performed by: PHYSICIAN ASSISTANT

## 2020-09-18 PROCEDURE — C1769 GUIDE WIRE: HCPCS | Performed by: INTERNAL MEDICINE

## 2020-09-18 PROCEDURE — 25010000002 MIDAZOLAM PER 1 MG: Performed by: INTERNAL MEDICINE

## 2020-09-18 PROCEDURE — 99233 SBSQ HOSP IP/OBS HIGH 50: CPT | Performed by: HOSPITALIST

## 2020-09-18 PROCEDURE — C1894 INTRO/SHEATH, NON-LASER: HCPCS | Performed by: INTERNAL MEDICINE

## 2020-09-18 RX ORDER — IPRATROPIUM BROMIDE AND ALBUTEROL SULFATE 2.5; .5 MG/3ML; MG/3ML
3 SOLUTION RESPIRATORY (INHALATION) EVERY 4 HOURS PRN
Status: DISCONTINUED | OUTPATIENT
Start: 2020-09-18 | End: 2020-09-19 | Stop reason: HOSPADM

## 2020-09-18 RX ORDER — SODIUM CHLORIDE 9 MG/ML
250 INJECTION, SOLUTION INTRAVENOUS ONCE AS NEEDED
Status: DISCONTINUED | OUTPATIENT
Start: 2020-09-18 | End: 2020-09-19 | Stop reason: HOSPADM

## 2020-09-18 RX ORDER — TEMAZEPAM 7.5 MG/1
7.5 CAPSULE ORAL NIGHTLY PRN
Status: DISCONTINUED | OUTPATIENT
Start: 2020-09-18 | End: 2020-09-19 | Stop reason: HOSPADM

## 2020-09-18 RX ORDER — MORPHINE SULFATE 2 MG/ML
1 INJECTION, SOLUTION INTRAMUSCULAR; INTRAVENOUS EVERY 4 HOURS PRN
Status: DISCONTINUED | OUTPATIENT
Start: 2020-09-18 | End: 2020-09-19 | Stop reason: HOSPADM

## 2020-09-18 RX ORDER — POTASSIUM CHLORIDE 750 MG/1
40 CAPSULE, EXTENDED RELEASE ORAL AS NEEDED
Status: DISCONTINUED | OUTPATIENT
Start: 2020-09-18 | End: 2020-09-19 | Stop reason: HOSPADM

## 2020-09-18 RX ORDER — LIDOCAINE HYDROCHLORIDE 10 MG/ML
INJECTION, SOLUTION EPIDURAL; INFILTRATION; INTRACAUDAL; PERINEURAL AS NEEDED
Status: DISCONTINUED | OUTPATIENT
Start: 2020-09-18 | End: 2020-09-18 | Stop reason: HOSPADM

## 2020-09-18 RX ORDER — MIDAZOLAM HYDROCHLORIDE 1 MG/ML
INJECTION INTRAMUSCULAR; INTRAVENOUS AS NEEDED
Status: DISCONTINUED | OUTPATIENT
Start: 2020-09-18 | End: 2020-09-18 | Stop reason: HOSPADM

## 2020-09-18 RX ORDER — NALOXONE HCL 0.4 MG/ML
0.4 VIAL (ML) INJECTION
Status: DISCONTINUED | OUTPATIENT
Start: 2020-09-18 | End: 2020-09-19 | Stop reason: HOSPADM

## 2020-09-18 RX ORDER — POTASSIUM CHLORIDE 7.45 MG/ML
10 INJECTION INTRAVENOUS
Status: DISCONTINUED | OUTPATIENT
Start: 2020-09-18 | End: 2020-09-19 | Stop reason: HOSPADM

## 2020-09-18 RX ORDER — FENTANYL CITRATE 50 UG/ML
INJECTION, SOLUTION INTRAMUSCULAR; INTRAVENOUS AS NEEDED
Status: DISCONTINUED | OUTPATIENT
Start: 2020-09-18 | End: 2020-09-18 | Stop reason: HOSPADM

## 2020-09-18 RX ORDER — HYDROCODONE BITARTRATE AND ACETAMINOPHEN 5; 325 MG/1; MG/1
1 TABLET ORAL EVERY 4 HOURS PRN
Status: DISCONTINUED | OUTPATIENT
Start: 2020-09-18 | End: 2020-09-19 | Stop reason: HOSPADM

## 2020-09-18 RX ORDER — ALPRAZOLAM 0.25 MG/1
0.25 TABLET ORAL 3 TIMES DAILY PRN
Status: DISCONTINUED | OUTPATIENT
Start: 2020-09-18 | End: 2020-09-19 | Stop reason: HOSPADM

## 2020-09-18 RX ORDER — POTASSIUM CHLORIDE 1.5 G/1.77G
40 POWDER, FOR SOLUTION ORAL AS NEEDED
Status: DISCONTINUED | OUTPATIENT
Start: 2020-09-18 | End: 2020-09-19 | Stop reason: HOSPADM

## 2020-09-18 RX ORDER — ACETAMINOPHEN 325 MG/1
650 TABLET ORAL EVERY 4 HOURS PRN
Status: DISCONTINUED | OUTPATIENT
Start: 2020-09-18 | End: 2020-09-19 | Stop reason: HOSPADM

## 2020-09-18 RX ADMIN — IPRATROPIUM BROMIDE AND ALBUTEROL SULFATE 3 ML: 2.5; .5 SOLUTION RESPIRATORY (INHALATION) at 07:52

## 2020-09-18 RX ADMIN — METOPROLOL SUCCINATE 12.5 MG: 25 TABLET, EXTENDED RELEASE ORAL at 21:12

## 2020-09-18 RX ADMIN — ENOXAPARIN SODIUM 40 MG: 40 INJECTION SUBCUTANEOUS at 04:51

## 2020-09-18 RX ADMIN — SACUBITRIL AND VALSARTAN 1 TABLET: 24; 26 TABLET, FILM COATED ORAL at 21:12

## 2020-09-18 RX ADMIN — ASPIRIN 81 MG: 81 TABLET, COATED ORAL at 08:26

## 2020-09-18 RX ADMIN — SODIUM CHLORIDE, PRESERVATIVE FREE 10 ML: 5 INJECTION INTRAVENOUS at 08:26

## 2020-09-18 RX ADMIN — MELATONIN TAB 5 MG 5 MG: 5 TAB at 21:19

## 2020-09-18 NOTE — PROGRESS NOTES
[facility] Medicine Services  PROGRESS NOTE    Patient Name: Sam Wilhelm  : 1960  MRN: [id number]    Date of Admission: 2020  Primary Care Physician: Steve Bhatia MD    Subjective   Subjective     CC:  F/U SOA    HPI:  Patient seen this morning prior to heart cath. Doing well, no issues overnight. Denies chest pain or SOA.    Review of Systems  Gen-no fevers, no chills  CV-no chest pain, no palpitations  Resp-no cough, no dyspnea  GI-no N/V/D, no abd pain    All other systems reviewed and negative except any additional pertinent positives and negatives as discussed in HPI.      Objective   Objective     Vital Signs:   Temp:  [97.5 °F (36.4 °C)-97.9 °F (36.6 °C)] 97.9 °F (36.6 °C)  Heart Rate:  [] 106  Resp:  [16-18] 18  BP: (120-147)/() 142/76        Physical Exam:  Gen-no acute distress  HENT-NCAT, mucous membranes moist  CV-RRR, S1 S2 normal, no m/r/g  Resp-CTAB, no wheezes or rales  Abd-soft, NT, ND, +BS  Ext-no edema  Neuro-A&Ox3, no focal deficits  Skin-no rashes  Psych-appropriate mood    Results Reviewed:  Results from last 7 days   Lab Units 20  04320  0010   WBC 10*3/mm3 10.89* 9.48 10.50   HEMOGLOBIN g/dL 17.8* 15.6 15.2   HEMATOCRIT % 52.2* 45.4 44.6   PLATELETS 10*3/mm3 212 187 176     Results from last 7 days   Lab Units 20  0430 20  0727 20  0429 20  0010   SODIUM mmol/L 134*  --  138 140   POTASSIUM mmol/L 3.3*  --  3.6 3.9   CHLORIDE mmol/L 94*  --  97* 102   CO2 mmol/L 28.0  --  28.0 26.0   BUN mg/dL 24*  --  17 18   CREATININE mg/dL 1.29*  --  1.14 1.21   GLUCOSE mg/dL 128*  --  86 92   CALCIUM mg/dL 9.2  --  9.4 9.2   ALT (SGPT) U/L  --   --   --  20   AST (SGOT) U/L  --   --   --  31   TROPONIN T ng/mL  --  <0.010 <0.010 <0.010   PROBNP pg/mL  --   --   --  5,229.0*     Estimated Creatinine Clearance: 50.3 mL/min (A) (by C-G formula based on SCr of 1.29 mg/dL  (H)).    Microbiology Results Abnormal     Procedure Component Value - Date/Time    Respiratory Panel PCR w/COVID-19(SARS-CoV-2) KAMALA/JUDY/SANJIV/PAD/COR/MAD In-House, NP Swab in UTM/VTM, 3-4 HR TAT - Swab, Nasopharynx [[id number]]  (Normal) Collected: 09/17/20 0134    Lab Status: Final result Specimen: Swab from Nasopharynx Updated: 09/17/20 0258     ADENOVIRUS, PCR Not Detected     Coronavirus 229E Not Detected     Coronavirus HKU1 Not Detected     Coronavirus NL63 Not Detected     Coronavirus OC43 Not Detected     COVID19 Not Detected     Human Metapneumovirus Not Detected     Human Rhinovirus/Enterovirus Not Detected     Influenza A PCR Not Detected     Influenza A H1 Not Detected     Influenza A H1 2009 PCR Not Detected     Influenza A H3 Not Detected     Influenza B PCR Not Detected     Parainfluenza Virus 1 Not Detected     Parainfluenza Virus 2 Not Detected     Parainfluenza Virus 3 Not Detected     Parainfluenza Virus 4 Not Detected     RSV, PCR Not Detected     Bordetella pertussis pcr Not Detected     Bordetella parapertussis PCR Not Detected     Chlamydophila pneumoniae PCR Not Detected     Mycoplasma pneumo by PCR Not Detected    Narrative:      Fact sheet for providers: https://docs.[employer]/wp-content/uploads/JXJ8704-6996-WP2.1-EUA-Provider-Fact-Sheet-3.pdf    Fact sheet for patients: https://docs.[employer]/wp-content/uploads/KMX6566-7353-GC7.1-EUA-Patient-Fact-Sheet-1.pdf          Imaging Results (Last 24 Hours)     ** No results found for the last 24 hours. **          Results for orders placed during the hospital encounter of 09/16/20   Transthoracic Echo Complete With Contrast if Necessary Per Protocol    Narrative · Calculated EF = 21% Left ventricular systolic function is severely   decreased.  · The left ventricular cavity is severely dilated.  · Left atrial cavity size is moderately dilated.  · Severe mitral valve regurgitation is present.          I have reviewed the  medications:  Scheduled Meds:aspirin, 81 mg, Oral, Daily  enoxaparin, 40 mg, Subcutaneous, Q AM  [MAR Hold] multivitamin, 1 tablet, Oral, Daily    And  [MAR Hold] vitamin B-1, 100 mg, Oral, Daily    And  [MAR Hold] folic acid, 1 mg, Oral, Daily  furosemide, 40 mg, Oral, Daily  metoprolol succinate XL, 12.5 mg, Oral, Nightly  pharmacy consult - [place], , Does not apply, Daily  sacubitril-valsartan, 1 tablet, Oral, Q12H  sodium chloride, 10 mL, Intravenous, Q12H  spironolactone, 25 mg, Oral, Daily      Continuous Infusions:   PRN Meds:.•  acetaminophen  •  ALPRAZolam  •  atropine  •  HYDROcodone-acetaminophen  •  ipratropium-albuterol  •  LORazepam **OR** LORazepam **OR** LORazepam **OR** LORazepam **OR** LORazepam **OR** LORazepam **OR** LORazepam **OR** LORazepam  •  melatonin  •  Morphine **AND** naloxone  •  ondansetron  •  potassium chloride **OR** potassium chloride **OR** potassium chloride  •  sodium chloride  •  sodium chloride  •  sodium chloride  •  temazepam    Assessment/Plan   Assessment & Plan     Active Hospital Problems    Diagnosis  POA   • **Acute combined systolic and diastolic congestive heart failure (CMS/HCC) [I50.41]  Yes   • Renal insufficiency [N28.9]  Yes   • Essential hypertension [I10]  Yes   • Macrocytosis without anemia [D75.89]  Yes   • Alcoholism (CMS/HCC) [F10.20]  Yes   • Tobacco abuse [Z72.0]  Yes      Resolved Hospital Problems   No resolved problems to display.        Brief Hospital Course to date:  Sam Wilhelm is a 60 y.o. male with hx of HTN and alcohol abuse who presents due to progressively worsening dyspnea with exertion and orthopnea. He is found to have new onset systolic congestive heart failure with markedly reduced EF. Admitted to hospitalist service.    New onset acute systolic CHF, EF 21%  Non-ischemic dilated cardiomyopathy  Severe mitral regurgitation  --Dr. Fajardo following. [facility] today showed mild to moderate 3 vessel coronary artery disease. Likely non-ischemic  cardiomyopathy. Recommends medical management. May need LifeVest?  --Received 2 doses of IV Lasix. Continue 40 mg PO Lasix daily, Metoprolol XL 12.5 mg daily. Cardiology added Entresto and Spironolactone as well.    Renal insufficiency  --Cr up a bit today.  --Would hold his nephrotoxic meds for today (Lasix, Entresto, Spironolactone), but can probably resume tomorrow if labs are stable.   --Continue to monitor following heart cath.    Hypokalemia  --Replace per protocol today.     HTN  --Was not on any home meds.  --Continue Metoprolol.     Alcohol abuse  --Drinks 6 beers nightly.  --PO rally pack.  --CIWA protocol but has not needed any PRN Ativan >24 hours. Can likely discontinue tomorrow.    DVT Prophylaxis:  Lovenox      Disposition: I expect the patient to be discharged home ~1-2 days pending final Cardiology recommendations and renal function    CODE STATUS:   Code Status and Medical Interventions:   Ordered at: 09/17/20 0115     Level Of Support Discussed With:    Patient     Code Status:    CPR     Medical Interventions (Level of Support Prior to Arrest):    Full       Electronically signed by Hemalatha Ruelas MD, 09/18/20, 12:53 PM EDT.

## 2020-09-18 NOTE — PLAN OF CARE
Problem: Adult Inpatient Plan of Care  Goal: Patient-Specific Goal (Individualized)  Outcome: Ongoing, Progressing  Flowsheets (Taken 9/18/2020 0714)  Patient-Specific Goals (Include Timeframe): Pt VSS, no complaints of pain this shift. Awaiting Cardiac cath this am. Seems to have rested well throughout the night. Will cont to monitor.   Goal Outcome Evaluation:

## 2020-09-18 NOTE — PLAN OF CARE
Goal Outcome Evaluation:         VSS. Pt remains NSR and on RA. Held his lasix, aldactone and Entresto d/t bump in Creat. Had LHC today with no interventions. Groin site is C/D/I and soft. No major complaints this shift. Will continue to monitor.

## 2020-09-18 NOTE — PROGRESS NOTES
Continued Stay Note  [facility]     Patient Name: Sam Wilhelm  MRN: [id number]  Today's Date: 9/18/2020    Admit Date: 9/16/2020    Discharge Plan     Row Name 09/18/20 0951       Plan    Plan  Home    Patient/Family in Agreement with Plan  yes    Plan Comments  Pt. is scheduled for [facility] today. Will follow and update as needed.    Final Discharge Disposition Code  01 - home or self-care        Discharge Codes    No documentation.             Laure Broderick RN

## 2020-09-19 VITALS
HEART RATE: 88 BPM | RESPIRATION RATE: 18 BRPM | WEIGHT: 126.2 LBS | TEMPERATURE: 97.7 F | SYSTOLIC BLOOD PRESSURE: 133 MMHG | OXYGEN SATURATION: 94 % | HEIGHT: 68 IN | DIASTOLIC BLOOD PRESSURE: 85 MMHG | BODY MASS INDEX: 19.13 KG/M2

## 2020-09-19 LAB
ANION GAP SERPL CALCULATED.3IONS-SCNC: 7 MMOL/L (ref 5–15)
BUN SERPL-MCNC: 20 MG/DL (ref 6–20)
BUN/CREAT SERPL: 15.6 (ref 7–25)
CALCIUM SPEC-SCNC: 9.4 MG/DL (ref 8.6–10.5)
CHLORIDE SERPL-SCNC: 93 MMOL/L (ref 98–107)
CO2 SERPL-SCNC: 34 MMOL/L (ref 22–29)
CREAT SERPL-MCNC: 1.28 MG/DL (ref 0.76–1.27)
DEPRECATED RDW RBC AUTO: 46.5 FL (ref 37–54)
ERYTHROCYTE [DISTWIDTH] IN BLOOD BY AUTOMATED COUNT: 12.3 % (ref 12.3–15.4)
GFR SERPL CREATININE-BSD FRML MDRD: 58 ML/MIN/1.73
GLUCOSE SERPL-MCNC: 109 MG/DL (ref 65–99)
HCT VFR BLD AUTO: 54.6 % (ref 37.5–51)
HGB BLD-MCNC: 18.6 G/DL (ref 13–17.7)
MCH RBC QN AUTO: 34.9 PG (ref 26.6–33)
MCHC RBC AUTO-ENTMCNC: 34.1 G/DL (ref 31.5–35.7)
MCV RBC AUTO: 102.4 FL (ref 79–97)
PLATELET # BLD AUTO: 217 10*3/MM3 (ref 140–450)
PMV BLD AUTO: 10.3 FL (ref 6–12)
POTASSIUM SERPL-SCNC: 4.6 MMOL/L (ref 3.5–5.2)
RBC # BLD AUTO: 5.33 10*6/MM3 (ref 4.14–5.8)
SODIUM SERPL-SCNC: 134 MMOL/L (ref 136–145)
WBC # BLD AUTO: 10.99 10*3/MM3 (ref 3.4–10.8)

## 2020-09-19 PROCEDURE — 85027 COMPLETE CBC AUTOMATED: CPT | Performed by: HOSPITALIST

## 2020-09-19 PROCEDURE — 25010000002 ENOXAPARIN PER 10 MG: Performed by: INTERNAL MEDICINE

## 2020-09-19 PROCEDURE — 99232 SBSQ HOSP IP/OBS MODERATE 35: CPT | Performed by: NURSE PRACTITIONER

## 2020-09-19 PROCEDURE — 99239 HOSP IP/OBS DSCHRG MGMT >30: CPT | Performed by: NURSE PRACTITIONER

## 2020-09-19 PROCEDURE — 80048 BASIC METABOLIC PNL TOTAL CA: CPT | Performed by: HOSPITALIST

## 2020-09-19 RX ORDER — SPIRONOLACTONE 25 MG/1
25 TABLET ORAL DAILY
Qty: 60 TABLET | Refills: 0 | Status: SHIPPED | OUTPATIENT
Start: 2020-09-20 | End: 2020-09-21

## 2020-09-19 RX ORDER — FUROSEMIDE 40 MG/1
40 TABLET ORAL DAILY
Qty: 60 TABLET | Refills: 0 | Status: SHIPPED | OUTPATIENT
Start: 2020-09-20 | End: 2020-09-21

## 2020-09-19 RX ORDER — METOPROLOL SUCCINATE 25 MG/1
12.5 TABLET, EXTENDED RELEASE ORAL NIGHTLY
Qty: 60 TABLET | Refills: 0 | Status: SHIPPED | OUTPATIENT
Start: 2020-09-19 | End: 2020-09-21

## 2020-09-19 RX ORDER — ASPIRIN 81 MG/1
81 TABLET ORAL DAILY
Qty: 30 TABLET | Refills: 0 | Status: SHIPPED | OUTPATIENT
Start: 2020-09-20

## 2020-09-19 RX ADMIN — ENOXAPARIN SODIUM 40 MG: 40 INJECTION SUBCUTANEOUS at 07:12

## 2020-09-19 RX ADMIN — SACUBITRIL AND VALSARTAN 1 TABLET: 24; 26 TABLET, FILM COATED ORAL at 08:36

## 2020-09-19 RX ADMIN — ASPIRIN 81 MG: 81 TABLET, COATED ORAL at 08:36

## 2020-09-19 RX ADMIN — SPIRONOLACTONE 25 MG: 25 TABLET ORAL at 08:36

## 2020-09-19 RX ADMIN — FUROSEMIDE 40 MG: 40 TABLET ORAL at 08:36

## 2020-09-19 NOTE — DISCHARGE SUMMARY
[facility] Medicine Services  DISCHARGE SUMMARY    Patient Name: Sam Wilhelm  : 1960  MRN: [id number]    Date of Admission: 2020 11:59 PM  Date of Discharge:  20  Primary Care Physician: Steve Bhatia MD    Consults     Date and Time Order Name Status Description    2020 0258 Inpatient Cardiology Consult Completed           Hospital Course     Presenting Problem:   Acute combined systolic and diastolic congestive heart failure (CMS/HCC) [I50.41]  Acute combined systolic and diastolic congestive heart failure (CMS/HCC) [I50.41]    Active Hospital Problems    Diagnosis  POA   • **Acute combined systolic and diastolic congestive heart failure (CMS/HCC) [I50.41]  Yes   • Renal insufficiency [N28.9]  Yes   • Essential hypertension [I10]  Yes   • Macrocytosis without anemia [D75.89]  Yes   • Alcoholism (CMS/HCC) [F10.20]  Yes   • Tobacco abuse [Z72.0]  Yes      Resolved Hospital Problems   No resolved problems to display.          Hospital Course:  Sam Wilhelm is a 60 y.o. male with hx of HTN and alcohol abuse who presented to the ED on 2020 due to progressively worsening dyspnea with exertion and orthopnea. He was found to have new onset systolic congestive heart failure with a markedly reduced EF of 21%. Dr. Monroy performed a left heart catheterization on 2020 which showed mild to moderate 3 vessel coronary artery disease. The patient's cardiomyopathy was felt to be nonischemic, and started the patient on Lasix, Entresto, and Spironolactone. Dr. Monroy recommended medical management of his coronary disease, and will follow up outpatient in 4 weeks. The patient was cleared for discharge by cardiology.The patient's creatinine trended up to 1.29 following diuresis, but has remained stable following left heart catheterization. He will need to follow up with his PCP in 1 week with repeat BMP.  At this time, the patient is stable and is  appropriate for discharge home.     New onset acute systolic CHF, EF 21%  Non-ischemic dilated cardiomyopathy  Severe mitral regurgitation  --LHC performed on 9/18/2020 by Dr. Monroy showed mild to moderate 3 vessel coronary artery disease. Likely non-ischemic cardiomyopathy. Recommends medical management.   --Received 2 doses of IV Lasix.   --Continue 40 mg PO Lasix daily, Metoprolol XL 12.5 mg daily.  --Cardiology added Entresto and Spironolactone as well.     Renal insufficiency  --Creat 1.21 on admission, 1.28 today   --Ok to resume Lasix, Entresto, Spironolactone  --Follow up with PCP in 1 week with BMP     Hypokalemia  --Replace per protocol today.     HTN  --Was not on any home meds.  --Continue Metoprolol.     Alcohol abuse  --Drinks 6 beers nightly.  --PO rally pack   --CIWA protocol but has not needed any PRN Ativan >24 hours    Discharge Follow Up Recommendations for outpatient labs/diagnostics:   Follow up with PCP in 1 week with NISHI.   Follow up with Cardiology in 4 weeks.     Day of Discharge     HPI:   Patient seen resting in bed in no apparent distress. No acute events overnight per nursing. He is breathing much better today, and denies chest pain. He is anxious to be discharged home. We discussed the importance of taking all medications as prescribed and keeping all recommended follow up appointments. No complaints at this time.     Review of Systems  Gen- No fevers, chills  CV- No chest pain, palpitations  Resp- No cough, dyspnea  GI- No N/V/D, abd pain    Vital Signs:   Temp:  [97.7 °F (36.5 °C)-98.2 °F (36.8 °C)] 97.7 °F (36.5 °C)  Heart Rate:  [66-97] 88  Resp:  [18] 18  BP: (102-134)/() 133/85     Physical Exam:  Constitutional: No acute distress, awake, alert  HENT: NCAT, mucous membranes moist  Respiratory: Clear to auscultation bilaterally, respiratory effort normal   Cardiovascular: RRR, no murmurs, palpable pedal pulses bilaterally, cap refill brisk, right fem cath site is soft with  no hematoma noted   Gastrointestinal: Positive bowel sounds, soft, nontender, nondistended  Musculoskeletal: No BLE edema   Psychiatric: Appropriate affect, cooperative  Neurologic: Oriented x 3, moves all extremities, speech clear  Skin: warm, dry, no visible rash     Pertinent  and/or Most Recent Results     Results from last 7 days   Lab Units 09/19/20  0649 09/18/20  0430 09/17/20  0429 09/17/20  0010   WBC 10*3/mm3 10.99* 10.89* 9.48 10.50   HEMOGLOBIN g/dL 18.6* 17.8* 15.6 15.2   HEMATOCRIT % 54.6* 52.2* 45.4 44.6   PLATELETS 10*3/mm3 217 212 187 176   SODIUM mmol/L 134* 134* 138 140   POTASSIUM mmol/L 4.6 3.3* 3.6 3.9   CHLORIDE mmol/L 93* 94* 97* 102   CO2 mmol/L 34.0* 28.0 28.0 26.0   BUN mg/dL 20 24* 17 18   CREATININE mg/dL 1.28* 1.29* 1.14 1.21   GLUCOSE mg/dL 109* 128* 86 92   CALCIUM mg/dL 9.4 9.2 9.4 9.2     Results from last 7 days   Lab Units 09/17/20  0010   BILIRUBIN mg/dL 0.3   ALK PHOS U/L 89   ALT (SGPT) U/L 20   AST (SGOT) U/L 31     Results from last 7 days   Lab Units 09/17/20  0727   CHOLESTEROL mg/dL 129   TRIGLYCERIDES mg/dL 47   HDL CHOL mg/dL 43     Results from last 7 days   Lab Units 09/17/20  0727 09/17/20  0429 09/17/20  0010   TSH uIU/mL  --  2.600  --    PROBNP pg/mL  --   --  5,229.0*   TROPONIN T ng/mL <0.010 <0.010 <0.010       Brief Urine Lab Results     None          Microbiology Results Abnormal     Procedure Component Value - Date/Time    Respiratory Panel PCR w/COVID-19(SARS-CoV-2) KAMALA/JUDY/SANJIV/PAD/COR/MAD In-House, NP Swab in UTM/VTM, 3-4 HR TAT - Swab, Nasopharynx [[id number]]  (Normal) Collected: 09/17/20 0134    Lab Status: Final result Specimen: Swab from Nasopharynx Updated: 09/17/20 0258     ADENOVIRUS, PCR Not Detected     Coronavirus 229E Not Detected     Coronavirus HKU1 Not Detected     Coronavirus NL63 Not Detected     Coronavirus OC43 Not Detected     COVID19 Not Detected     Human Metapneumovirus Not Detected     Human Rhinovirus/Enterovirus Not Detected      Influenza A PCR Not Detected     Influenza A H1 Not Detected     Influenza A H1 2009 PCR Not Detected     Influenza A H3 Not Detected     Influenza B PCR Not Detected     Parainfluenza Virus 1 Not Detected     Parainfluenza Virus 2 Not Detected     Parainfluenza Virus 3 Not Detected     Parainfluenza Virus 4 Not Detected     RSV, PCR Not Detected     Bordetella pertussis pcr Not Detected     Bordetella parapertussis PCR Not Detected     Chlamydophila pneumoniae PCR Not Detected     Mycoplasma pneumo by PCR Not Detected    Narrative:      Fact sheet for providers: https://docs.5app/wp-content/uploads/NQP4539-1925-WO3.1-EUA-Provider-Fact-Sheet-3.pdf    Fact sheet for patients: https://docs.5app/wp-content/uploads/TRV4964-3455-EX5.1-EUA-Patient-Fact-Sheet-1.pdf          Imaging Results (All)     Procedure Component Value Units Date/Time    CT Angiogram Chest With & Without Contrast [[id number]] Collected: 09/17/20 0351     Updated: 09/17/20 0353    Narrative:      CT ANGIOGRAM CHEST W WO CONTRAST    INDICATION:   Progressive shortness of breath over the past month with congestive heart failure    TECHNIQUE:   CT angiogram of the chest with 100 cc of Isovue-300 IV contrast. 3-D reconstructions were obtained and reviewed.   Radiation dose reduction techniques included automated exposure control or exposure modulation based on body size. Count of known CT and  cardiac nuc med studies performed in previous 12 months: 0.     COMPARISON:   7/24/2012    FINDINGS:   Chest images at mediastinal window show no adenopathy. There are small bilateral pleural effusions. There is good opacification of the pulmonary arteries. No pulmonary artery filling defects are seen to suggest emboli. The CT angiographic images also  show no evidence of emboli.        Chest images at lung window show prominent pulmonary vascular markings with interlobular septal thickening more prominent at the bases than the apices. This is  accompanied by peribronchial cuffing and edema. No focal areas of alveolar consolidation are  seen. Findings are consistent with congestive heart failure. Additionally, there is mild atelectasis at both lung bases posteriorly and there is linear scarring or atelectasis in the right middle lobe.      Impression:      There is moderate interstitial edema with bilateral pleural effusions consistent with congestive heart failure. No evidence of pulmonary emboli.    Signer Name: Enrique John MD   Signed: 9/17/2020 3:51 AM   Workstation Name: [email or web address]    Radiology [facility]    XR Chest 1 View [[id number]] Collected: 09/17/20 0051     Updated: 09/17/20 0053    Narrative:      CR Chest 1 Vw    INDICATION:   Mid chest pain with shortness of breath on arrival     COMPARISON:    None available.    FINDINGS:  Single portable AP view(s) of the chest.  Cardiomegaly is noted. The aorta is tortuous. In the lungs pulmonary vascular markings are diffusely prominent and there is diffuse interstitial edema with thickening of the interlobular septae bilaterally.  Findings suggest moderate congestive heart failure. No effusions are seen. No focal infiltrates are noted.      Impression:      Moderate congestive heart failure with interstitial edema bilaterally. Emphysema.    Signer Name: Enrique John MD   Signed: 9/17/2020 12:51 AM   Workstation Name: [email or web address] [facility]                    Results for orders placed during the hospital encounter of 09/16/20   Transthoracic Echo Complete With Contrast if Necessary Per Protocol    Narrative · Calculated EF = 21% Left ventricular systolic function is severely   decreased.  · The left ventricular cavity is severely dilated.  · Left atrial cavity size is moderately dilated.  · Severe mitral valve regurgitation is present.          Plan for Follow-up of Pending Labs/Results: Follow up with PCP in 1 week.     Discharge Details        Discharge  Medications      New Medications      Instructions Start Date   Aspirin Low Dose 81 MG EC tablet  Generic drug: aspirin   81 mg, Oral, Daily   Start Date: September 20, 2020     Entresto 24-26 MG tablet  Generic drug: sacubitril-valsartan   1 tablet, Oral, Every 12 Hours Scheduled      furosemide 40 MG tablet  Commonly known as: LASIX   40 mg, Oral, Daily   Start Date: September 20, 2020     metoprolol succinate XL 25 MG 24 hr tablet  Commonly known as: TOPROL-XL   12.5 mg, Oral, Nightly      pharmacy consult - MTM   Does not apply, Daily      spironolactone 25 MG tablet  Commonly known as: ALDACTONE   25 mg, Oral, Daily   Start Date: September 20, 2020        Continue These Medications      Instructions Start Date   VITAMIN B-12 IJ   Injection, Every 30 Days      VITAMIN C PO   1 capsule, Oral         Stop These Medications    ALEVE PM PO            No Known Allergies      Discharge Disposition:  Home or Self Care    Diet:  Hospital:  Diet Order   Procedures   • Diet Regular; Cardiac       Activity: As tolerated           CODE STATUS:    Code Status and Medical Interventions:   Ordered at: 09/17/20 0115     Level Of Support Discussed With:    Patient     Code Status:    CPR     Medical Interventions (Level of Support Prior to Arrest):    Full       No future appointments.    Additional Instructions for the Follow-ups that You Need to Schedule     Discharge Follow-up with PCP   As directed       Currently Documented PCP:    Steve Bhatia MD    PCP Phone Number:    [phone]     Follow Up Details: Follow up with PCP in 1 week with [facility]         Discharge Follow-up with Specified Provider: Dr. Monroy; 1 Month   As directed      To: Dr. Monroy    Follow Up: 1 Month                 Time Spent on Discharge:  I spent  34  minutes on this discharge activity which included: face-to-face encounter with the patient, reviewing the data in the system, coordination of the care with the nursing staff as well as  consultants, documentation, and entering orders.

## 2020-09-19 NOTE — PLAN OF CARE
Goal Outcome Evaluation:         VSS. NSR on the monitor with PACs. DC teaching completed. DC'd home.

## 2020-09-20 ENCOUNTER — READMISSION MANAGEMENT (OUTPATIENT)
Dept: CALL CENTER | Facility: HOSPITAL | Age: 60
End: 2020-09-20

## 2020-09-20 PROBLEM — I25.10 CORONARY ARTERY DISEASE INVOLVING NATIVE CORONARY ARTERY OF NATIVE HEART: Status: ACTIVE | Noted: 2020-09-20

## 2020-09-20 PROBLEM — I38 VALVULAR HEART DISEASE: Status: ACTIVE | Noted: 2020-09-20

## 2020-09-20 NOTE — OUTREACH NOTE
Prep Survey      Responses   [facility] facility patient discharged from?  [place]   Is LACE score < 7 ?  No   Eligibility  Readm Mgmt   Discharge diagnosis  CHF,  heart cath   COVID-19 Test Status  Negative   Does the patient have one of the following disease processes/diagnoses(primary or secondary)?  CHF   Does the patient have Home health ordered?  No   Is there a DME ordered?  No   Comments regarding appointments  call for apmts - see AVS   Medication alerts for this patient  see AVS for changes   Prep survey completed?  Yes          Carmen Amaral RN

## 2020-09-20 NOTE — PROGRESS NOTES
[facility] at [facility]  Cardiology Progress Note      Chief Complaint/Reason for service:    · Acute systolic heart failure  · Valvular heart disease         Patient underwent cardiac catheterization with Dr. Slava Monroy for his newly reduced LVEF 21%.  Coronary angiography showed moderate nonobstructive CAD.  Patient was transferred to the telemetry floor for overnight monitoring where he has remained stable and breathing easy on room air.  He has been ambulating the halls without difficulty.  He desires to be discharged home as he cares for his mother who has severe dementia.    Past medical, surgical, social and family history reviewed in the patient's electronic medical record.         Vital Sign Min/Max for last 24 hours  Temp  Min: 97.7 °F (36.5 °C)  Max: 97.7 °F (36.5 °C)   BP  Min: 133/85  Max: 133/85   Pulse  Min: 88  Max: 88   Resp  Min: 18  Max: 18   SpO2  Min: 94 %  Max: 94 %   No data recorded    No intake or output data in the 24 hours ending 09/20/20 0852        Physical Exam  Constitutional:       Appearance: He is well-developed.   HENT:      Head: Normocephalic.   Neck:      Vascular: No carotid bruit or JVD.   Cardiovascular:      Rate and Rhythm: Normal rate and regular rhythm.      Heart sounds: Murmur present. No friction rub. No gallop.    Pulmonary:      Effort: Pulmonary effort is normal.      Breath sounds: Normal breath sounds.   Abdominal:      General: Bowel sounds are normal. There is no distension.      Palpations: Abdomen is soft.   Skin:     General: Skin is warm and dry.   Neurological:      Mental Status: He is alert and oriented to person, place, and time.         Tele: Normal sinus rhythm    Results Review (reviewed the patient's recent labs in the electronic medical record):     EKG (9/17/2020): Normal sinus rhythm.    CXR (9/17/2020): Moderate CHF with interstitial edema bilaterally.  Emphysema.    ECHO (9/17/2020): LVEF 21%.  Severe MR.      Results  from last 7 days   Lab Units 09/19/20  0649 09/18/20  0430 09/17/20  0429 09/17/20  0010   SODIUM mmol/L 134* 134* 138 140   POTASSIUM mmol/L 4.6 3.3* 3.6 3.9   CHLORIDE mmol/L 93* 94* 97* 102   BUN mg/dL 20 24* 17 18   CREATININE mg/dL 1.28* 1.29* 1.14 1.21     Results from last 7 days   Lab Units 09/17/20  0727 09/17/20  0429 09/17/20  0010   TROPONIN T ng/mL <0.010 <0.010 <0.010     Results from last 7 days   Lab Units 09/19/20  0649 09/18/20  0430 09/17/20  0429   WBC 10*3/mm3 10.99* 10.89* 9.48   HEMOGLOBIN g/dL 18.6* 17.8* 15.6   HEMATOCRIT % 54.6* 52.2* 45.4   PLATELETS 10*3/mm3 217 212 187       No results found for: HGBA1C    Lab Results   Component Value Date    CHOL 129 09/17/2020    TRIG 47 09/17/2020    HDL 43 09/17/2020    LDL 77 09/17/2020              Active Hospital Problems    Diagnosis POA   • **Acute combined systolic and diastolic congestive heart failure (CMS/HCC) [I50.41] Yes     Priority: High     · Echo (9/17/2020):LVEF = 21%.  Severe MR.  · Cardiac cath (9/18/2020): 20% LAD.  40% circumflex.  30% RCA.  Medical management.     • Valvular heart disease [I38] Yes     Priority: High     · Echo (9/17/2020): LVEF 21%.  Severe MR.     • Coronary artery disease involving native coronary artery of native heart [I25.10] Yes     Priority: High     · Cardiac cath (9/18/2020): Moderate nonobstructive CAD     • Renal insufficiency [N28.9] Yes     Priority: High   • Essential hypertension [I10] Yes     Priority: Medium   • Alcoholism (CMS/HCC) [F10.20] Yes     Priority: Medium   • Tobacco abuse [Z72.0] Yes     Priority: Low   • Macrocytosis without anemia [D75.89] Yes              · Okay to discharge patient home  · Will go home on guideline directed medical therapy for heart failure including metoprolol succinate, furosemide, spironolactone and Entresto  · Will need to follow-up at the heart valve clinic in 5 days for a BMP.  Would like for them to start statin therapy at that time  · Follow-up with   Slava Monroy in 3 to 4 weeks    Virginia Tran, APRN  9/20/2020

## 2020-09-21 RX ORDER — METOPROLOL SUCCINATE 25 MG/1
12.5 TABLET, EXTENDED RELEASE ORAL NIGHTLY
Qty: 60 TABLET | Refills: 0 | Status: SHIPPED | OUTPATIENT
Start: 2020-09-21

## 2020-09-21 RX ORDER — SPIRONOLACTONE 25 MG/1
25 TABLET ORAL DAILY
Qty: 60 TABLET | Refills: 0 | Status: SHIPPED | OUTPATIENT
Start: 2020-09-21

## 2020-09-21 RX ORDER — FUROSEMIDE 40 MG/1
40 TABLET ORAL DAILY
Qty: 60 TABLET | Refills: 0 | Status: SHIPPED | OUTPATIENT
Start: 2020-09-21 | End: 2020-09-28 | Stop reason: SDUPTHER

## 2020-09-23 ENCOUNTER — READMISSION MANAGEMENT (OUTPATIENT)
Dept: CALL CENTER | Facility: HOSPITAL | Age: 60
End: 2020-09-23

## 2020-09-23 NOTE — OUTREACH NOTE
CHF Week 1 Survey      Responses   [facility] [place] [facility] from?  [place]   Does the patient have one of the following disease processes/diagnoses(primary or secondary)?  CHF   CHF Week 1 attempt successful?  No   Unsuccessful attempts  Attempt 1          Wanda Montero RN

## 2020-09-24 ENCOUNTER — READMISSION MANAGEMENT (OUTPATIENT)
Dept: CALL CENTER | Facility: HOSPITAL | Age: 60
End: 2020-09-24

## 2020-09-24 NOTE — OUTREACH NOTE
CHF Week 1 Survey      Responses   [facility] patient discharged from?  [place]   Does the patient have one of the following disease processes/diagnoses(primary or secondary)?  CHF   CHF Week 1 attempt successful?  No   Unsuccessful attempts  Attempt 2          Poppy Flannery RN

## 2020-09-24 NOTE — PROGRESS NOTES
"[facility]  [facility]      09/25/2020         Sam Wilhelm  [address] [place] [id number]  [unfilled]    1960    Steve Bhatia MD    Sam Wilhelm is a 60 y.o. male.      Subjective:     Chief Complaint:  Congestive Heart Failure and Establish Care       HPI   60-year-old male with history of hypertension and alcohol abuse who presents today as a hospital referral for new onset acute systolic congestive heart failure.  Patient presented the ED on 9/16 with worsening shortness of breath and orthopnea.  Echo was performed he was found to have an EF of 21% with severe MR.  Patient underwent left heart cath on 9/18 which showed mild to moderate three-vessel coronary artery disease without severe stenosis.   Patient was started on Lasix, Entresto, metoprolol succinate and spironolactone.  He had a slight creatinine bump of 1.2-1.28 with diuresis, but was relatively stable throughout hospital stay.  Reports since discharge he is done very well.  He denies any shortness of breath, orthopnea, PND, lower extremity edema, palpitations, weight gain or chest pain.  He reports that he is quit smoking and has cut back his alcohol to 1 or 2 beers a night.  He does admit that he is a \"salt-aholic\"    Patient Active Problem List   Diagnosis   • Acute combined systolic and diastolic congestive heart failure (CMS/HCC)   • Essential hypertension   • Macrocytosis without anemia   • Alcoholism (CMS/HCC)   • Tobacco abuse   • Renal insufficiency   • Valvular heart disease   • Coronary artery disease involving native coronary artery of native heart       Past Medical History:   Diagnosis Date   • B12 deficiency    • COPD (chronic obstructive pulmonary disease) (CMS/HCC)    • Essential hypertension 9/17/2020       Past Surgical History:   Procedure Laterality Date   • CARDIAC CATHETERIZATION N/A 9/18/2020    Procedure: LEFT HEART CATH;  Surgeon: Higinio Monroy MD;  Location: " [facility] CATH INVASIVE LOCATION;  Service: Cardiovascular;  Laterality: N/A;   • HAND SURGERY         Family History   Problem Relation Age of Onset   • Dementia Mother    • Heart disease Father    • Stroke Father    • Heart failure Father    • No Known Problems Sister    • No Known Problems Brother    • No Known Problems Maternal Grandmother    • No Known Problems Maternal Grandfather    • No Known Problems Paternal Grandmother    • No Known Problems Paternal Grandfather        Social History     Socioeconomic History   • Marital status:      Spouse name: Not on file   • Number of children: Not on file   • Years of education: Not on file   • Highest education level: Not on file   Tobacco Use   • Smoking status: Former Smoker     Packs/day: 2.00     Types: Cigarettes     Quit date: 2020     Years since quittin.0   • Smokeless tobacco: Former User   • Tobacco comment: QUIT LAST WEDNESDAY   Substance and Sexual Activity   • Alcohol use: Yes     Frequency: 4 or more times a week     Drinks per session: 5 or 6     Binge frequency: Never     Comment: 4-6 BEER NIGHTLY   • Drug use: Yes     Types: Marijuana   • Sexual activity: Defer   Social History Narrative    Lives alone. Cares for mother who has heart failure.         Caffeine: 12 pack Mt Dew daily       No Known Allergies      Current Outpatient Medications:   •  Ascorbic Acid (VITAMIN C PO), Take 1 capsule by mouth., Disp: , Rfl:   •  aspirin 81 MG EC tablet, Take 1 tablet by mouth Daily., Disp: 30 tablet, Rfl: 0  •  Cyanocobalamin (VITAMIN B-12 IJ), Inject  as directed Every 30 (Thirty) Days., Disp: , Rfl:   •  furosemide (LASIX) 40 MG tablet, Take 1 tablet by mouth Daily., Disp: 60 tablet, Rfl: 0  •  metoprolol succinate XL (TOPROL-XL) 25 MG 24 hr tablet, Take 0.5 tablets by mouth Every Night., Disp: 60 tablet, Rfl: 0  •  pharmacy consult - [place], Daily., Disp:  , Rfl:   •  sacubitril-valsartan (ENTRESTO) 24-26 MG tablet, Take 1 tablet by mouth Every  "12 (Twelve) Hours., Disp: 60 tablet, Rfl: 0  •  spironolactone (ALDACTONE) 25 MG tablet, Take 1 tablet by mouth Daily., Disp: 60 tablet, Rfl: 0    The following portions of the patient's history were reviewed today and updated as appropriate: allergies, current medications, past family history, past medical history, past social history, past surgical history and problem list     Review of Systems   Constitution: Positive for malaise/fatigue. Negative for chills and fever.   HENT: Negative.    Eyes: Negative.    Cardiovascular: Negative for chest pain, claudication, cyanosis, dyspnea on exertion, irregular heartbeat, leg swelling, near-syncope, orthopnea, palpitations, paroxysmal nocturnal dyspnea and syncope.   Respiratory: Negative for cough, shortness of breath and snoring.    Endocrine: Negative.    Hematologic/Lymphatic: Does not bruise/bleed easily.   Skin: Negative for poor wound healing.   Musculoskeletal: Negative.    Gastrointestinal: Negative for abdominal pain, heartburn, hematemesis, melena, nausea and vomiting.   Genitourinary: Negative.  Negative for hematuria.   Neurological: Negative.    Psychiatric/Behavioral: Negative.    Allergic/Immunologic: Negative.        Objective:     Vitals:    09/25/20 0949 09/25/20 0953 09/25/20 0954   BP: 100/63 106/68 91/51   BP Location: Left arm Right arm Left arm   Patient Position: Standing Sitting Sitting   Cuff Size: Adult Adult Adult   Pulse: 68 (!) 49 77   Resp:   20   Temp:   97.5 °F (36.4 °C)   TempSrc:   Temporal   SpO2: 95% 95% 98%   Weight:   61.3 kg (135 lb 2 oz)   Height:   172.7 cm (68\")       Body mass index is 20.55 kg/m².    Vitals signs reviewed.   Constitutional:       General: Not in acute distress.     Appearance: Well-developed.   Eyes:      Conjunctiva/sclera: Conjunctivae normal.      Pupils: Pupils are equal, round, and reactive to light.   HENT:      Head: Normocephalic.   Neck:      Musculoskeletal: Neck supple.      Thyroid: No thyromegaly. "      Vascular: No JVD.   Pulmonary:      Effort: Pulmonary effort is normal. No respiratory distress.      Breath sounds: Decreased breath sounds present.   Chest:      Chest wall: Not tender to palpatation.   Cardiovascular:      Normal rate. Occasional ectopic beats. Regular rhythm.      No gallop.      Comments: Distant heart sounds  Pulses:     Intact distal pulses.   Abdominal:      General: Bowel sounds are normal.      Palpations: Abdomen is soft.   Musculoskeletal: Normal range of motion.   Skin:     General: Skin is warm and dry.   Neurological:      Mental Status: Alert and oriented to person, place, and time.   Psychiatric:         Behavior: Behavior normal.         Thought Content: Thought content normal.         Lab and Diagnostic Review:  Echo 9/17/20  · Calculated EF = 21% Left ventricular systolic function is severely decreased.  · The left ventricular cavity is severely dilated.  · Left atrial cavity size is moderately dilated.  · Severe mitral valve regurgitation is present.     [facility] 9/18/20  Mild to moderate three-vessel CAD, not severe    Nonischemic cardiomyopathy     Lab Results   Component Value Date    CHOL 129 09/17/2020    TRIG 47 09/17/2020    HDL 43 09/17/2020    LDL 77 09/17/2020       Lab Results   Component Value Date    GLUCOSE 109 (H) 09/19/2020    CALCIUM 9.4 09/19/2020     (L) 09/19/2020    K 4.6 09/19/2020    CO2 34.0 (H) 09/19/2020    CL 93 (L) 09/19/2020    BUN 20 09/19/2020    CREATININE 1.28 (H) 09/19/2020    EGFRIFNONA 58 (L) 09/19/2020    BCR 15.6 09/19/2020    ANIONGAP 7.0 09/19/2020     ED today shows normal sinus rhythm, left axis deviation, anterior infarct    Assessment and Plan:   1. Acute combined systolic and diastolic congestive heart failure (CMS/HCC)  Appears euvolemic  Heart failure education provided today including signs and symptoms, causes of heart failure, medications, daily weights, low sodium diet (less than 1500 mg per day), 1.5L fluid restriction  and daily physical activity as tolerated. Reviewed HF Zones with patient   Continue metoprolol succinate, spironolactone and Entresto.  Unable to uptitrate guideline directed medical therapy due to hypotension.  Discussed LifeVest with Dr. Monroy and he does not recommend at this time  - Basic Metabolic Panel; Future  - proBNP; Future  - Magnesium; Future    2. Coronary artery disease involving native coronary artery of native heart without angina pectoris  Mild to moderate CAD  Continue aspirin and beta-blocker. Lipids diet controlled    3. Irregular heart beat  Some ectopy noted on today's exam but no ectopy showed on EKG  - ECG 12 Lead; Future  - Magnesium; Future    4. Hypertension  Low normal blood pressure.  He is asymptomatic.  He is prone to hypotension but monitors closely at home.  Continue to monitor and call systolic blood pressure is consistently 90 or below    Patient to follow-up with Dr. Monroy in 3 weeks  Follow-up with heart valve center as needed    It has been a pleasure to participate in the care of this patient.  Patient was instructed to call the [facility] with any questions, concerns, or worsening symptoms.    *Please note that portions of this note were completed with a voice recognition program. Efforts were made to edit the dictations, but occasionally words are mistranscribed.

## 2020-09-25 ENCOUNTER — HOSPITAL ENCOUNTER (OUTPATIENT)
Dept: CARDIOLOGY | Facility: HOSPITAL | Age: 60
Discharge: HOME | End: 2020-09-25

## 2020-09-25 ENCOUNTER — LAB (OUTPATIENT)
Dept: LAB | Facility: HOSPITAL | Age: 60
End: 2020-09-25

## 2020-09-25 ENCOUNTER — TELEPHONE (OUTPATIENT)
Dept: CARDIOLOGY | Facility: HOSPITAL | Age: 60
End: 2020-09-25

## 2020-09-25 ENCOUNTER — OFFICE VISIT (OUTPATIENT)
Dept: CARDIOLOGY | Facility: HOSPITAL | Age: 60
End: 2020-09-25

## 2020-09-25 ENCOUNTER — READMISSION MANAGEMENT (OUTPATIENT)
Dept: CALL CENTER | Facility: HOSPITAL | Age: 60
End: 2020-09-25

## 2020-09-25 VITALS
TEMPERATURE: 97.5 F | SYSTOLIC BLOOD PRESSURE: 91 MMHG | DIASTOLIC BLOOD PRESSURE: 51 MMHG | RESPIRATION RATE: 20 BRPM | HEART RATE: 77 BPM | HEIGHT: 68 IN | WEIGHT: 135.13 LBS | OXYGEN SATURATION: 98 % | BODY MASS INDEX: 20.48 KG/M2

## 2020-09-25 DIAGNOSIS — I50.41 ACUTE COMBINED SYSTOLIC AND DIASTOLIC CONGESTIVE HEART FAILURE (HCC): Primary | ICD-10-CM

## 2020-09-25 DIAGNOSIS — I49.9 IRREGULAR HEART BEAT: ICD-10-CM

## 2020-09-25 DIAGNOSIS — I25.10 CORONARY ARTERY DISEASE INVOLVING NATIVE CORONARY ARTERY OF NATIVE HEART WITHOUT ANGINA PECTORIS: ICD-10-CM

## 2020-09-25 DIAGNOSIS — I50.41 ACUTE COMBINED SYSTOLIC AND DIASTOLIC CONGESTIVE HEART FAILURE (HCC): ICD-10-CM

## 2020-09-25 DIAGNOSIS — I10 ESSENTIAL HYPERTENSION: ICD-10-CM

## 2020-09-25 LAB
ANION GAP SERPL CALCULATED.3IONS-SCNC: 11.6 MMOL/L (ref 5–15)
BUN SERPL-MCNC: 22 MG/DL (ref 6–20)
BUN/CREAT SERPL: 15.9 (ref 7–25)
CALCIUM SPEC-SCNC: 9.7 MG/DL (ref 8.6–10.5)
CHLORIDE SERPL-SCNC: 99 MMOL/L (ref 98–107)
CO2 SERPL-SCNC: 29.4 MMOL/L (ref 22–29)
CREAT SERPL-MCNC: 1.38 MG/DL (ref 0.76–1.27)
GFR SERPL CREATININE-BSD FRML MDRD: 53 ML/MIN/1.73
GLUCOSE SERPL-MCNC: 74 MG/DL (ref 65–99)
MAGNESIUM SERPL-MCNC: 2.1 MG/DL (ref 1.6–2.6)
NT-PROBNP SERPL-MCNC: 971.7 PG/ML (ref 0–900)
POTASSIUM SERPL-SCNC: 4.4 MMOL/L (ref 3.5–5.2)
SODIUM SERPL-SCNC: 140 MMOL/L (ref 136–145)

## 2020-09-25 PROCEDURE — 99204 OFFICE O/P NEW MOD 45 MIN: CPT | Performed by: NURSE PRACTITIONER

## 2020-09-25 PROCEDURE — 36415 COLL VENOUS BLD VENIPUNCTURE: CPT

## 2020-09-25 PROCEDURE — 83735 ASSAY OF MAGNESIUM: CPT

## 2020-09-25 PROCEDURE — 93010 ELECTROCARDIOGRAM REPORT: CPT | Performed by: INTERNAL MEDICINE

## 2020-09-25 PROCEDURE — 80048 BASIC METABOLIC PNL TOTAL CA: CPT

## 2020-09-25 PROCEDURE — 93005 ELECTROCARDIOGRAM TRACING: CPT | Performed by: NURSE PRACTITIONER

## 2020-09-25 PROCEDURE — 83880 ASSAY OF NATRIURETIC PEPTIDE: CPT

## 2020-09-25 NOTE — OUTREACH NOTE
CHF Week 1 Survey      Responses   [facility] patient discharged from?  [place]   Does the patient have one of the following disease processes/diagnoses(primary or secondary)?  CHF   CHF Week 1 attempt successful?  Yes   Call start time  1509   Call end time  1512   Discharge diagnosis  CHF,  heart cath   Meds reviewed with patient/caregiver?  Yes   Is the patient having any side effects they believe may be caused by any medication additions or changes?  No   Does the patient have all medications ordered at discharge?  Yes   Is the patient taking all medications as directed (includes completed medication regime)?  Yes   Does the patient have a primary care provider?   Yes   Does the patient have an appointment with their PCP within 7 days of discharge?  Yes   Has the patient kept scheduled appointments due by today?  Yes   Has home health visited the patient within 72 hours of discharge?  N/A   Pulse Ox monitoring  None   Psychosocial issues?  No   Did the patient receive a copy of their discharge instructions?  Yes   Nursing interventions  Reviewed instructions with patient   What is the patient's perception of their health status since discharge?  Improving   Nursing interventions  Nurse provided patient education   Is the patient weighing daily?  Yes   Does the patient have scales?  Yes   Daily weight interventions  Education provided on importance of daily weight   Is the patient able to teach back Heart Failure diet management?  Yes   Is the patient able to teach back Heart Failure Zones?  Yes   Is the patient able to teach back signs and symptoms of worsening condition? (i.e. weight gain, shortness of air, etc.)  Yes   Is the patient/caregiver able to teach back the hierarchy of who to call/visit for symptoms/problems? PCP, Specialist, Home health nurse, Urgent Care, ED, 911  Yes   Additional teach back comments  Encouraged decreasing sodium, daily weights.  Says he is feeling some better.    CHF Week  1 call completed?  Yes   Wrap up additional comments  Improving, but needs to watch sodium.          Carina Loving RN

## 2020-09-28 ENCOUNTER — TELEPHONE (OUTPATIENT)
Dept: CARDIOLOGY | Facility: HOSPITAL | Age: 60
End: 2020-09-28

## 2020-09-28 RX ORDER — FUROSEMIDE 40 MG/1
20 TABLET ORAL DAILY
Qty: 60 TABLET | Refills: 0 | Status: SHIPPED | OUTPATIENT
Start: 2020-09-28

## 2020-09-28 NOTE — TELEPHONE ENCOUNTER
Results for orders placed or performed in visit on 09/25/20   Basic Metabolic Panel    Specimen: Blood   Result Value Ref Range    Glucose 74 65 - 99 mg/dL    BUN 22 (H) 6 - 20 mg/dL    Creatinine 1.38 (H) 0.76 - 1.27 mg/dL    Sodium 140 136 - 145 mmol/L    Potassium 4.4 3.5 - 5.2 mmol/L    Chloride 99 98 - 107 mmol/L    CO2 29.4 (H) 22.0 - 29.0 mmol/L    Calcium 9.7 8.6 - 10.5 mg/dL    eGFR Non African Amer 53 (L) >60 mL/min/1.73    BUN/Creatinine Ratio 15.9 7.0 - 25.0    Anion Gap 11.6 5.0 - 15.0 mmol/L   proBNP    Specimen: Blood   Result Value Ref Range    proBNP 971.7 (H) 0.0 - 900.0 pg/mL   Magnesium    Specimen: Blood   Result Value Ref Range    Magnesium 2.1 1.6 - 2.6 mg/dL     Call patient with lab results.  Mild renal insufficiency noted.  He does note some orthostatic lightheadedness and worsening restless legs.  Advised to decrease his Lasix to 20 mg daily but continue to monitor weights and symptoms closely.  Take 40 mg as needed for 3 pound weight gain in 24 hours or 5 pounds in a week and/or worsening shortness of breath.  Patient and his advocate other both verbalized understanding.  Advised to call with any new or worsening symptoms.  Also advised to keep upcoming follow-up with Dr. Monroy

## 2020-10-01 ENCOUNTER — READMISSION MANAGEMENT (OUTPATIENT)
Dept: CALL CENTER | Facility: HOSPITAL | Age: 60
End: 2020-10-01

## 2020-10-01 NOTE — OUTREACH NOTE
CHF Week 2 Survey      Responses   [facility] patient discharged from?  [place]   Does the patient have one of the following disease processes/diagnoses(primary or secondary)?  CHF   Week 2 attempt successful?  No   Unsuccessful attempts  Attempt 1          Yvette Contreras RN

## 2020-10-05 ENCOUNTER — READMISSION MANAGEMENT (OUTPATIENT)
Dept: CALL CENTER | Facility: HOSPITAL | Age: 60
End: 2020-10-05

## 2020-10-05 NOTE — OUTREACH NOTE
CHF Week 2 Survey      Responses   [facility] patient discharged from?  [place]   Does the patient have one of the following disease processes/diagnoses(primary or secondary)?  CHF   Week 2 attempt successful?  No   Unsuccessful attempts  Attempt 2          Ofelia Saldivar RN

## 2022-06-07 ENCOUNTER — LAB REQUISITION (OUTPATIENT)
Dept: LAB | Facility: HOSPITAL | Age: 62
End: 2022-06-07

## 2022-06-07 DIAGNOSIS — R22.32 LOCALIZED SWELLING, MASS AND LUMP, LEFT UPPER LIMB: ICD-10-CM

## 2022-06-07 PROCEDURE — 88304 TISSUE EXAM BY PATHOLOGIST: CPT | Performed by: PLASTIC SURGERY

## 2022-06-08 LAB
CYTO UR: NORMAL
LAB AP CASE REPORT: NORMAL
LAB AP CLINICAL INFORMATION: NORMAL
PATH REPORT.FINAL DX SPEC: NORMAL
PATH REPORT.GROSS SPEC: NORMAL

## 2023-08-14 ENCOUNTER — TRANSCRIBE ORDERS (OUTPATIENT)
Dept: GENERAL RADIOLOGY | Facility: CLINIC | Age: 63
End: 2023-08-14
Payer: COMMERCIAL

## 2023-08-14 DIAGNOSIS — Z18.9: Primary | ICD-10-CM

## 2023-08-14 DIAGNOSIS — H02.819: Primary | ICD-10-CM

## 2023-08-28 ENCOUNTER — OFFICE VISIT (OUTPATIENT)
Dept: NEUROSURGERY | Facility: CLINIC | Age: 63
End: 2023-08-28
Payer: COMMERCIAL

## 2023-08-28 ENCOUNTER — PREP FOR SURGERY (OUTPATIENT)
Dept: OTHER | Facility: HOSPITAL | Age: 63
End: 2023-08-28
Payer: COMMERCIAL

## 2023-08-28 VITALS
HEIGHT: 68 IN | SYSTOLIC BLOOD PRESSURE: 112 MMHG | WEIGHT: 140 LBS | BODY MASS INDEX: 21.22 KG/M2 | DIASTOLIC BLOOD PRESSURE: 68 MMHG | TEMPERATURE: 98 F

## 2023-08-28 DIAGNOSIS — M53.86 SCIATICA ASSOCIATED WITH DISORDER OF LUMBAR SPINE: ICD-10-CM

## 2023-08-28 DIAGNOSIS — G95.20 CERVICAL SPINAL CORD COMPRESSION: Primary | ICD-10-CM

## 2023-08-28 DIAGNOSIS — G95.9 MYELOPATHY: Primary | ICD-10-CM

## 2023-08-28 PROBLEM — M54.12 CERVICAL MYELOPATHY WITH CERVICAL RADICULOPATHY: Status: ACTIVE | Noted: 2023-08-28

## 2023-08-28 PROCEDURE — 99204 OFFICE O/P NEW MOD 45 MIN: CPT | Performed by: NEUROLOGICAL SURGERY

## 2023-08-28 RX ORDER — CHLORHEXIDINE GLUCONATE 4 G/100ML
SOLUTION TOPICAL
Qty: 120 ML | Refills: 0 | Status: SHIPPED | OUTPATIENT
Start: 2023-08-28

## 2023-08-28 RX ORDER — SODIUM CHLORIDE 0.9 % (FLUSH) 0.9 %
3 SYRINGE (ML) INJECTION EVERY 12 HOURS SCHEDULED
OUTPATIENT
Start: 2023-08-28

## 2023-08-28 RX ORDER — FAMOTIDINE 20 MG/1
20 TABLET, FILM COATED ORAL
OUTPATIENT
Start: 2023-08-28

## 2023-08-28 RX ORDER — SODIUM CHLORIDE 9 MG/ML
40 INJECTION, SOLUTION INTRAVENOUS AS NEEDED
OUTPATIENT
Start: 2023-08-28

## 2023-08-28 RX ORDER — IBUPROFEN 800 MG/1
800 TABLET ORAL ONCE
OUTPATIENT
Start: 2023-08-28 | End: 2023-08-28

## 2023-08-28 RX ORDER — ACETAMINOPHEN 325 MG/1
650 TABLET ORAL ONCE
OUTPATIENT
Start: 2023-08-28 | End: 2023-08-28

## 2023-08-28 RX ORDER — PREGABALIN 100 MG/1
CAPSULE ORAL
COMMUNITY

## 2023-08-28 RX ORDER — HYDROCODONE BITARTRATE AND ACETAMINOPHEN 7.5; 325 MG/1; MG/1
1 TABLET ORAL ONCE
OUTPATIENT
Start: 2023-08-28 | End: 2023-08-28

## 2023-08-28 RX ORDER — ATORVASTATIN CALCIUM 40 MG/1
TABLET, FILM COATED ORAL
COMMUNITY

## 2023-08-28 RX ORDER — CEFAZOLIN SODIUM 2 G/100ML
2 INJECTION, SOLUTION INTRAVENOUS ONCE
OUTPATIENT
Start: 2023-08-28 | End: 2023-08-28

## 2023-08-28 RX ORDER — SODIUM CHLORIDE 0.9 % (FLUSH) 0.9 %
3-10 SYRINGE (ML) INJECTION AS NEEDED
OUTPATIENT
Start: 2023-08-28

## 2023-08-28 RX ORDER — SODIUM CHLORIDE, SODIUM LACTATE, POTASSIUM CHLORIDE, CALCIUM CHLORIDE 600; 310; 30; 20 MG/100ML; MG/100ML; MG/100ML; MG/100ML
9 INJECTION, SOLUTION INTRAVENOUS CONTINUOUS
OUTPATIENT
Start: 2023-08-28

## 2023-08-28 RX ORDER — BUPROPION HYDROCHLORIDE 150 MG/1
TABLET ORAL
COMMUNITY

## 2023-08-28 NOTE — PROGRESS NOTES
NAME: JEAN PIERRE GANDARA   DOS: 2023  : 1960  PCP: Steve Bhatia MD    Chief Complaint:    Chief Complaint   Patient presents with    Neck Pain     Patient stated that this has been going on for years, but the last few months has gotten worse.  Trouble with turning his neck to the right       History of Present Illness:  63 y.o. male   I saw a 63-year-old male neurosurgical consultation.  He is a gentleman with a history of chronic longstanding neck issues and chronic pain issues he is here for evaluation he reports a worsening of his hands difficulty with dexterity issues and neck pain    He is accompanied by his significant other who may have even operated on    He tried physical therapy chiropractic manipulation he is noted gait instability as well as spastic bladder he does have a significant coronary history    PMHX  Allergies:  No Known Allergies  Medications    Current Outpatient Medications:     Ascorbic Acid (VITAMIN C PO), Take 1 capsule by mouth., Disp: , Rfl:     atorvastatin (LIPITOR) 40 MG tablet, , Disp: , Rfl:     buPROPion XL (WELLBUTRIN XL) 150 MG 24 hr tablet, , Disp: , Rfl:     Cyanocobalamin (VITAMIN B-12 IJ), Inject  as directed Every 30 (Thirty) Days., Disp: , Rfl:     metoprolol succinate XL (TOPROL-XL) 25 MG 24 hr tablet, Take 0.5 tablets by mouth Every Night., Disp: 60 tablet, Rfl: 0    pregabalin (LYRICA) 100 MG capsule, , Disp: , Rfl:     sacubitril-valsartan (ENTRESTO) 24-26 MG tablet, Take 1 tablet by mouth Every 12 (Twelve) Hours., Disp: 60 tablet, Rfl: 0    spironolactone (ALDACTONE) 25 MG tablet, Take 1 tablet by mouth Daily., Disp: 60 tablet, Rfl: 0    aspirin 81 MG EC tablet, Take 1 tablet by mouth Daily., Disp: 30 tablet, Rfl: 0    furosemide (LASIX) 40 MG tablet, Take 0.5 tablets by mouth Daily., Disp: 60 tablet, Rfl: 0    HYDROcodone-acetaminophen (NORCO) 5-325 MG per tablet, Take 1 tablet by mouth every six hours as needed for pain, Disp: 10 tablet, Rfl:  0    ondansetron (ZOFRAN) 8 MG tablet, Take 1 tablet by mouth Every 8 (Eight) Hours As Needed., Disp: 10 tablet, Rfl: 0    ondansetron ODT (ZOFRAN-ODT) 8 MG disintegrating tablet, Place 1 tablet on the tongue Every 8 (Eight) Hours As Needed., Disp: 10 tablet, Rfl: 0    pharmacy consult - [facility]TM, Daily., Disp:  , Rfl:   Past Medical History:  Past Medical History:   Diagnosis Date    B12 deficiency     COPD (chronic obstructive pulmonary disease)     Essential hypertension 2020     Past Surgical History:  Past Surgical History:   Procedure Laterality Date    CARDIAC CATHETERIZATION N/A 2020    Procedure: LEFT HEART CATH;  Surgeon: Higinio Monroy MD;  Location: [facility] CATH INVASIVE LOCATION;  Service: Cardiovascular;  Laterality: N/A;    HAND SURGERY       Social Hx:  Social History     Tobacco Use    Smoking status: Every Day     Packs/day: 0.50     Types: Cigarettes     Last attempt to quit: 2020     Years since quittin.9    Smokeless tobacco: Former   Vaping Use    Vaping Use: Never used   Substance Use Topics    Alcohol use: Yes     Comment: 4-6 BEER NIGHTLY    Drug use: Yes     Types: Marijuana     Family Hx:  Family History   Problem Relation Age of Onset    Dementia Mother     Heart disease Father     Stroke Father     Heart failure Father     No Known Problems Sister     No Known Problems Brother     No Known Problems Maternal Grandmother     No Known Problems Maternal Grandfather     No Known Problems Paternal Grandmother     No Known Problems Paternal Grandfather      Review of Systems:        Review of Systems   Constitutional:  Negative for activity change, appetite change, chills, diaphoresis, fatigue, fever and unexpected weight change.   HENT:  Negative for congestion, dental problem, drooling, ear discharge, ear pain, facial swelling, hearing loss, mouth sores, nosebleeds, postnasal drip, rhinorrhea, sinus pressure, sinus pain, sneezing, sore throat, tinnitus, trouble swallowing  and voice change.    Eyes:  Negative for photophobia, pain, discharge, redness, itching and visual disturbance.   Respiratory:  Negative for apnea, cough, choking, chest tightness, shortness of breath, wheezing and stridor.    Cardiovascular:  Negative for chest pain, palpitations and leg swelling.   Gastrointestinal:  Negative for abdominal distention, abdominal pain, anal bleeding, blood in stool, constipation, diarrhea, nausea, rectal pain and vomiting.   Endocrine: Negative for cold intolerance, heat intolerance, polydipsia, polyphagia and polyuria.   Genitourinary:  Negative for decreased urine volume, difficulty urinating, dysuria, enuresis, flank pain, frequency, genital sores, hematuria and urgency.   Musculoskeletal:  Positive for neck pain and neck stiffness. Negative for arthralgias, back pain, gait problem, joint swelling and myalgias.   Skin:  Negative for color change, pallor, rash and wound.   Allergic/Immunologic: Negative for environmental allergies, food allergies and immunocompromised state.   Neurological:  Positive for numbness (shoulders to hands). Negative for dizziness, tremors, seizures, syncope, facial asymmetry, speech difficulty, weakness, light-headedness and headaches.   Hematological:  Negative for adenopathy. Does not bruise/bleed easily.   Psychiatric/Behavioral:  Negative for agitation, behavioral problems, confusion, decreased concentration, dysphoric mood, hallucinations, self-injury, sleep disturbance and suicidal ideas. The patient is not nervous/anxious and is not hyperactive.       I have reviewed this note template and all pertinent parts of the review of systems social, family history, surgical history and medication list    Physical Examination:  Vitals:    08/28/23 1241   BP: 112/68   Temp: 98 øF (36.7 øC)      General Appearance:   Well developed, well nourished, well groomed, alert, and cooperative.  Neurological examination:  Neurologic Exam  Vital signs were reviewed  and documented in the chart  Patient appeared in good neurologic function with normal comprehension fluent speech  Mood and affect are normal  Sense of smell deferred  Stigmata of COPD    Muscle bulk and tone normal  5 out of 5 strength no motor drift significant shoulder arthropathy bilaterally  Gait normal intact wide-based unstable  Negative Romberg  No clonus long tract signs or myelopathy  Arthritis hands  Reflexes symmetrically brisk  No edema noted and extremities skin appears normal  Arms are warm and well-perfused        Review of Imaging/DATA:  MRI was personally reviewed that shows C3-4 significant cord compression with signal change mild amount of facet arthropathy with both anterior posterior compression I reviewed these films personally  Diagnoses/Plan:    Mr. Wilhelm is a 63 y.o. male   1.  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  COPD    3.  History of cardiac disease    4.  Tobacco use    I explained the risk benefits and expected outcome of major elective surgery for their problem, complications from approach, and infection, the risk of neurologic implications after surgery as well as need for repeat surgeries and most importantly failure to achieve quality of life improvement from the surgery to the patient.  I talked him about a posterior approach that would avoid fusion if possible given his nicotine use that being said he does require surgery given the degree of cord compression and progressive cervical spondylitic myelopathy symptoms    Plan will be C3-4 posterior cervical laminectomy  I explained all the risk of tendon outcome and made no guarantees regarding pain relief    I instructed him on fall precautions as well as signs and symptoms to represent sooner for surgery    We will get arrangements for cardiac clearance with Dr. Monroy given his significant coronary disease

## 2023-08-31 ENCOUNTER — TELEPHONE (OUTPATIENT)
Dept: NEUROSURGERY | Facility: CLINIC | Age: 63
End: 2023-08-31
Payer: COMMERCIAL

## 2023-08-31 NOTE — TELEPHONE ENCOUNTER
Provider:  Dante  Surgery/Procedure:  Upcoming: Cervical Laminectomy decompression posterior C3-4  Surgery/Procedure Date:  9/20/23  Last visit:   8/28/23  Next visit: NA     Reason for call:  Mr. Wilhelm's spouse, Ingrid called. She reports that Mr. Wilhelm is having some increased tightness and pain in his neck. She reports he was offered a muscle relaxer at his appointment but declined at that time, would like to see if we would be able to prescribe that at this time? He left work today due to the tightness and pain, and is leaving for a trip to [place] tomorrow so would like something to keep him comfortable while away.

## 2023-09-01 RX ORDER — METHOCARBAMOL 750 MG/1
750 TABLET, FILM COATED ORAL EVERY 8 HOURS
Qty: 60 TABLET | Refills: 0 | Status: SHIPPED | OUTPATIENT
Start: 2023-09-01

## 2023-09-15 ENCOUNTER — PRE-ADMISSION TESTING (OUTPATIENT)
Dept: PREADMISSION TESTING | Facility: HOSPITAL | Age: 63
End: 2023-09-15
Payer: COMMERCIAL

## 2023-09-15 VITALS — BODY MASS INDEX: 21.85 KG/M2 | WEIGHT: 144.18 LBS | HEIGHT: 68 IN

## 2023-09-15 DIAGNOSIS — G95.9 MYELOPATHY: ICD-10-CM

## 2023-09-15 LAB
ANION GAP SERPL CALCULATED.3IONS-SCNC: 9 MMOL/L (ref 5–15)
BUN SERPL-MCNC: 16 MG/DL (ref 8–23)
BUN/CREAT SERPL: 12.1 (ref 7–25)
CALCIUM SPEC-SCNC: 9.8 MG/DL (ref 8.6–10.5)
CHLORIDE SERPL-SCNC: 102 MMOL/L (ref 98–107)
CO2 SERPL-SCNC: 27 MMOL/L (ref 22–29)
CREAT SERPL-MCNC: 1.32 MG/DL (ref 0.76–1.27)
DEPRECATED RDW RBC AUTO: 48.5 FL (ref 37–54)
EGFRCR SERPLBLD CKD-EPI 2021: 61.4 ML/MIN/1.73
ERYTHROCYTE [DISTWIDTH] IN BLOOD BY AUTOMATED COUNT: 12.8 % (ref 12.3–15.4)
GLUCOSE SERPL-MCNC: 63 MG/DL (ref 65–99)
HCT VFR BLD AUTO: 38.9 % (ref 37.5–51)
HGB BLD-MCNC: 13.1 G/DL (ref 13–17.7)
MCH RBC QN AUTO: 34.7 PG (ref 26.6–33)
MCHC RBC AUTO-ENTMCNC: 33.7 G/DL (ref 31.5–35.7)
MCV RBC AUTO: 102.9 FL (ref 79–97)
PLATELET # BLD AUTO: 214 10*3/MM3 (ref 140–450)
PMV BLD AUTO: 9.6 FL (ref 6–12)
POTASSIUM SERPL-SCNC: 4.5 MMOL/L (ref 3.5–5.2)
RBC # BLD AUTO: 3.78 10*6/MM3 (ref 4.14–5.8)
SODIUM SERPL-SCNC: 138 MMOL/L (ref 136–145)
WBC NRBC COR # BLD: 11.8 10*3/MM3 (ref 3.4–10.8)

## 2023-09-15 PROCEDURE — 36415 COLL VENOUS BLD VENIPUNCTURE: CPT

## 2023-09-15 PROCEDURE — 80048 BASIC METABOLIC PNL TOTAL CA: CPT

## 2023-09-15 PROCEDURE — 87081 CULTURE SCREEN ONLY: CPT

## 2023-09-15 PROCEDURE — 85027 COMPLETE CBC AUTOMATED: CPT

## 2023-09-15 RX ORDER — PHENOL 1.4 %
10 AEROSOL, SPRAY (ML) MUCOUS MEMBRANE NIGHTLY
COMMUNITY

## 2023-09-15 RX ORDER — MELATONIN
2000 2 TIMES DAILY
COMMUNITY

## 2023-09-15 NOTE — DISCHARGE INSTRUCTIONS
The following information and instructions were given:    Do not eat, drink, smoke or chew gum after midnight the night before surgery. This includes no mints.  Take all routine, prescribed medications including heart and blood pressure medicines with a sip of water unless otherwise instructed by your physician.   Do NOT take diabetic medication unless instructed by your physician.      DO NOT shave for two days before your procedure.  Do not wear makeup.      DO NOT wear fingernail polish (gel/regular) and/or acrylic/artificial nails on the day of surgery. If you had a recent manicure and would rather not remove polish or artificial nails, the minimum requirement is that the polish/artificial nails must be removed from the middle finger on each hand.      If you are having surgery/procedure on an upper extremity, fingernail polish/artificial fingernails must be removed for surgery.  NO EXCEPTIONS.      If you are having surgery/procedure on a lower extremity, toenail polish on both extremities must be removed for surgery.  NO EXCEPTIONS.    Remove all jewelry (advise to go to jeweler if unable to remove).  Jewelry, especially rings, can no longer be taped for surgery.    Leave anything you consider valuable at home.    Leave your suitcase in the car until after your surgery if you are staying overnight.    Bring the following with you the day of your procedure (when applicable):       -Picture ID and insurance cards       -Co-pay/deductible required by insurance       -Medications in the original bottles or a detailed list (name, dosage, frequency of medications) including all over-the-counter medications if not brought to PAT       -Copy of advance directive, living will or power of  documents if not brought to PAT       -CPAP or BIPAP mask and tubing (do not bring machine)       -Skin prep instruction(s) sheet       -PAT Pass    Educational handout or binder (joint replacements) related to procedure given  to patient.  Educational handout also includes general information related to the recovery that mentions signs and symptoms of infection and when to call the doctor.    When applicable, an ERAS handout was given to patient.    Respirex use and pneumonia prevention education provided in Pre Admission Testing general education video.    Information related to infection and hand hygiene mentioned in Pre Admission Testing general education video. Patient instructed to call their doctor if any of the following symptoms are noted during recovery:  Fever of 100.4 F or higher, incision that is warm or has increasing bleeding, redness or drainage.    DVT Prevention instructions given in general education video presentation during Pre Admission Testing appointment that stress the importance of ambulation to improve blood circulation.  Also encouraged patient to perform foot exercises when in bed and application of a sequential device may be applied to lower extremities to improve circulation.      Please apply Chlorhexidine wipes to surgical area (if instructed) the night before procedure and the AM of procedure and document date/time of applications on skin prep instruction sheet.

## 2023-09-15 NOTE — PAT
An arrival time for procedure was not provided during PAT visit. If patient had any questions or concerns about their arrival time, they were instructed to contact their surgeon/physician.  Additionally, if the patient referred to an arrival time that was acquired from their my chart account, patient was encouraged to verify that time with their surgeon/physician. Arrival times are NOT provided in Pre Admission Testing Department.    Patient viewed general PAT education video as instructed in their preoperative information received from their surgeon.  Patient stated the general PAT education video was viewed in its entirety and survey completed.  Copies of PAT general education handouts (Incentive Spirometry, Meds to Beds Program, Patient Belongings, Pre-op skin preparation instructions, Blood Glucose testing, Visitor policy, Surgery FAQ, Code H) distributed to patient if not printed. Education related to the PAT pass and skin preparation for surgery (if applicable) completed in PAT as a reinforcement to PAT education video. Patient instructed to return PAT pass provided today as well as completed skin preparation sheet (if applicable) on the day of procedure.     Additionally if patient had not viewed video yet but intended to view it at home or in our waiting area, then referred them to the handout with QR code/link provided during PAT visit.  Instructed patient to complete survey after viewing the video in its entirety.  Encouraged patient/family to read PAT general education handouts thoroughly and notify PAT staff with any questions or concerns. Patient verbalized understanding of all information and priority content.    Patient denies any current skin issues.     Patient to apply Chlorhexadine wipes  to surgical area (as instructed) the night before procedure and the AM of procedure. Wipes provided.    Bactroban (if prescribed) and Chlorhexidine Prescription prescribed by physician before PAT visit.  Verified  with patient that medication(s) were picked up from their pharmacy.  Written instructions given to patient during PAT visit.  Patient/family also instructed to complete skin prep checklist and return the checklist on the day of surgery to preoperative staff.  Patient/family verbalized understanding.    Patient instructed to drink 20 ounces of Gatorade or Gatorlyte (if diabetic) and it needs to be completed 1 hour (for Main OR patients) or 2 hours (scheduled  section & [facility] patients) before given arrival time for procedure (NO RED Gatorade and NO Gatorade Zero).    Patient verbalized understanding.    Per Anesthesia Request, patient instructed not to take their ACE/ARB medications on the AM of surgery.    Post-Surgery Information Instruction Sheet given to patient during Pre-Admission Testing Visit with verbal instructions to patient to return with PAT PASS on the day of surgery. Additionally, encouraged patient to review the information provided.    Verified patient previously completed cardiology visit for cardiac risk assessment in preparation for upcoming procedure, completion of 12-lead ECG within six months, and risk assessment letter reviewed. No further interventions required.   CARDIAC CLEARANCE FROM DR. HAHN FROM 23 ON CHART.     EKG FROM 23 ON CHART. PT DENIES CP/SOA.

## 2023-09-16 LAB — MRSA SPEC QL CULT: NORMAL

## 2023-09-20 ENCOUNTER — ANESTHESIA (OUTPATIENT)
Dept: PERIOP | Facility: HOSPITAL | Age: 63
End: 2023-09-20
Payer: COMMERCIAL

## 2023-09-20 ENCOUNTER — APPOINTMENT (OUTPATIENT)
Dept: GENERAL RADIOLOGY | Facility: HOSPITAL | Age: 63
End: 2023-09-20
Payer: COMMERCIAL

## 2023-09-20 ENCOUNTER — ANESTHESIA EVENT CONVERTED (OUTPATIENT)
Dept: ANESTHESIOLOGY | Facility: HOSPITAL | Age: 63
End: 2023-09-20
Payer: COMMERCIAL

## 2023-09-20 ENCOUNTER — ANESTHESIA EVENT (OUTPATIENT)
Dept: PERIOP | Facility: HOSPITAL | Age: 63
End: 2023-09-20
Payer: COMMERCIAL

## 2023-09-20 ENCOUNTER — HOSPITAL ENCOUNTER (OUTPATIENT)
Facility: HOSPITAL | Age: 63
LOS: 1 days | Discharge: HOME | End: 2023-09-21
Attending: NEUROLOGICAL SURGERY | Admitting: NEUROLOGICAL SURGERY
Payer: COMMERCIAL

## 2023-09-20 DIAGNOSIS — M54.12 CERVICAL MYELOPATHY WITH CERVICAL RADICULOPATHY: Primary | ICD-10-CM

## 2023-09-20 DIAGNOSIS — G95.9 MYELOPATHY: ICD-10-CM

## 2023-09-20 DIAGNOSIS — G95.9 CERVICAL MYELOPATHY WITH CERVICAL RADICULOPATHY: Primary | ICD-10-CM

## 2023-09-20 PROCEDURE — 63045 LAM FACETEC & FORAMOT CRV: CPT | Performed by: PHYSICIAN ASSISTANT

## 2023-09-20 PROCEDURE — 25010000002 SUGAMMADEX 200 MG/2ML SOLUTION: Performed by: ANESTHESIOLOGY

## 2023-09-20 PROCEDURE — 25010000002 FENTANYL CITRATE (PF) 100 MCG/2ML SOLUTION: Performed by: NURSE ANESTHETIST, CERTIFIED REGISTERED

## 2023-09-20 PROCEDURE — 25010000002 PROPOFOL 10 MG/ML EMULSION: Performed by: NURSE ANESTHETIST, CERTIFIED REGISTERED

## 2023-09-20 PROCEDURE — 25010000002 DEXAMETHASONE PER 1 MG: Performed by: NURSE ANESTHETIST, CERTIFIED REGISTERED

## 2023-09-20 PROCEDURE — 76000 FLUOROSCOPY <1 HR PHYS/QHP: CPT

## 2023-09-20 PROCEDURE — C1889 IMPLANT/INSERT DEVICE, NOC: HCPCS | Performed by: NEUROLOGICAL SURGERY

## 2023-09-20 PROCEDURE — 25010000002 PHENYLEPHRINE 10 MG/ML SOLUTION 1 ML VIAL: Performed by: NURSE ANESTHETIST, CERTIFIED REGISTERED

## 2023-09-20 PROCEDURE — C1713 ANCHOR/SCREW BN/BN,TIS/BN: HCPCS | Performed by: NEUROLOGICAL SURGERY

## 2023-09-20 PROCEDURE — 25010000002 ONDANSETRON PER 1 MG: Performed by: ANESTHESIOLOGY

## 2023-09-20 PROCEDURE — 25010000002 BUPIVACAINE (PF) 0.25 % SOLUTION: Performed by: NEUROLOGICAL SURGERY

## 2023-09-20 PROCEDURE — 25010000002 PHENYLEPHRINE 10 MG/ML SOLUTION: Performed by: NURSE ANESTHETIST, CERTIFIED REGISTERED

## 2023-09-20 PROCEDURE — 25010000002 CEFAZOLIN IN DEXTROSE 2000 MG/ 100 ML SOLUTION: Performed by: NEUROLOGICAL SURGERY

## 2023-09-20 PROCEDURE — 63045 LAM FACETEC & FORAMOT CRV: CPT | Performed by: NEUROLOGICAL SURGERY

## 2023-09-20 DEVICE — DURAGEN® PLUS DURAL REGENERATION MATRIX, 3 IN X 3 IN (7.5 CM X 7.5 CM)
Type: IMPLANTABLE DEVICE | Site: SPINE CERVICAL | Status: FUNCTIONAL
Brand: DURAGEN® PLUS

## 2023-09-20 DEVICE — HEMOST ABS SURGIFOAM SZ100 8X12 10MM: Type: IMPLANTABLE DEVICE | Site: SPINE CERVICAL | Status: FUNCTIONAL

## 2023-09-20 DEVICE — FLOSEAL HEMOSTATIC MATRIX, 10ML
Type: IMPLANTABLE DEVICE | Site: SPINE CERVICAL | Status: FUNCTIONAL
Brand: FLOSEAL HEMOSTATIC MATRIX

## 2023-09-20 DEVICE — ADHERUS AUTOSPRAY DURAL SEALANT IS A STERILE, SINGLE-USE, ELECTROMECHANICAL, BATTERY OPERATED, DEVICE WITH INTERNAL SYSTEM COMPONENTS THAT PROVIDE AIR FLOW TO AID IN THE DELIVERY OF A SYNTHETIC, ABSORBABLE, TWO-COMPONENT HYDROGEL SEALANT SYSTEM AND ALLOW DELIVERY TO BE INTERRUPTED WITHOUT CLOGGING.
Type: IMPLANTABLE DEVICE | Site: SPINE CERVICAL | Status: FUNCTIONAL
Brand: ADHERUS AUTOSPRAY DURAL SEALANT

## 2023-09-20 RX ORDER — METOPROLOL SUCCINATE 25 MG/1
12.5 TABLET, EXTENDED RELEASE ORAL NIGHTLY
Status: DISCONTINUED | OUTPATIENT
Start: 2023-09-20 | End: 2023-09-21 | Stop reason: HOSPADM

## 2023-09-20 RX ORDER — ONDANSETRON 2 MG/ML
INJECTION INTRAMUSCULAR; INTRAVENOUS AS NEEDED
Status: DISCONTINUED | OUTPATIENT
Start: 2023-09-20 | End: 2023-09-20 | Stop reason: SURG

## 2023-09-20 RX ORDER — HYDROCODONE BITARTRATE AND ACETAMINOPHEN 7.5; 325 MG/1; MG/1
1 TABLET ORAL ONCE
Status: COMPLETED | OUTPATIENT
Start: 2023-09-20 | End: 2023-09-20

## 2023-09-20 RX ORDER — SPIRONOLACTONE 25 MG/1
25 TABLET ORAL DAILY
Status: DISCONTINUED | OUTPATIENT
Start: 2023-09-20 | End: 2023-09-21 | Stop reason: HOSPADM

## 2023-09-20 RX ORDER — HYDROMORPHONE HYDROCHLORIDE 1 MG/ML
0.5 INJECTION, SOLUTION INTRAMUSCULAR; INTRAVENOUS; SUBCUTANEOUS
Status: DISCONTINUED | OUTPATIENT
Start: 2023-09-20 | End: 2023-09-20 | Stop reason: SDUPTHER

## 2023-09-20 RX ORDER — FENTANYL CITRATE 50 UG/ML
INJECTION, SOLUTION INTRAMUSCULAR; INTRAVENOUS AS NEEDED
Status: DISCONTINUED | OUTPATIENT
Start: 2023-09-20 | End: 2023-09-20 | Stop reason: SURG

## 2023-09-20 RX ORDER — SODIUM CHLORIDE 9 MG/ML
40 INJECTION, SOLUTION INTRAVENOUS AS NEEDED
Status: CANCELLED | OUTPATIENT
Start: 2023-09-20

## 2023-09-20 RX ORDER — DIAZEPAM 5 MG/1
5 TABLET ORAL EVERY 6 HOURS PRN
Status: DISCONTINUED | OUTPATIENT
Start: 2023-09-20 | End: 2023-09-21 | Stop reason: HOSPADM

## 2023-09-20 RX ORDER — METHOCARBAMOL 750 MG/1
750 TABLET, FILM COATED ORAL EVERY 8 HOURS SCHEDULED
Status: DISCONTINUED | OUTPATIENT
Start: 2023-09-20 | End: 2023-09-21 | Stop reason: HOSPADM

## 2023-09-20 RX ORDER — NALOXONE HCL 0.4 MG/ML
0.4 VIAL (ML) INJECTION
Status: DISCONTINUED | OUTPATIENT
Start: 2023-09-20 | End: 2023-09-21 | Stop reason: HOSPADM

## 2023-09-20 RX ORDER — ALBUTEROL SULFATE 2.5 MG/3ML
2.5 SOLUTION RESPIRATORY (INHALATION) ONCE AS NEEDED
Status: DISCONTINUED | OUTPATIENT
Start: 2023-09-20 | End: 2023-09-20 | Stop reason: HOSPADM

## 2023-09-20 RX ORDER — PROPOFOL 10 MG/ML
VIAL (ML) INTRAVENOUS AS NEEDED
Status: DISCONTINUED | OUTPATIENT
Start: 2023-09-20 | End: 2023-09-20 | Stop reason: SURG

## 2023-09-20 RX ORDER — SODIUM CHLORIDE 0.9 % (FLUSH) 0.9 %
10 SYRINGE (ML) INJECTION AS NEEDED
Status: DISCONTINUED | OUTPATIENT
Start: 2023-09-20 | End: 2023-09-20 | Stop reason: HOSPADM

## 2023-09-20 RX ORDER — PREGABALIN 100 MG/1
100 CAPSULE ORAL 2 TIMES DAILY
Status: DISCONTINUED | OUTPATIENT
Start: 2023-09-20 | End: 2023-09-21 | Stop reason: HOSPADM

## 2023-09-20 RX ORDER — CEFAZOLIN SODIUM 2 G/100ML
2000 INJECTION, SOLUTION INTRAVENOUS EVERY 8 HOURS
Status: COMPLETED | OUTPATIENT
Start: 2023-09-20 | End: 2023-09-21

## 2023-09-20 RX ORDER — FENTANYL CITRATE 50 UG/ML
50 INJECTION, SOLUTION INTRAMUSCULAR; INTRAVENOUS
Status: DISCONTINUED | OUTPATIENT
Start: 2023-09-20 | End: 2023-09-20 | Stop reason: SDUPTHER

## 2023-09-20 RX ORDER — LIDOCAINE HYDROCHLORIDE AND EPINEPHRINE 5; 5 MG/ML; UG/ML
INJECTION, SOLUTION INFILTRATION; PERINEURAL AS NEEDED
Status: DISCONTINUED | OUTPATIENT
Start: 2023-09-20 | End: 2023-09-20 | Stop reason: HOSPADM

## 2023-09-20 RX ORDER — OXYCODONE HYDROCHLORIDE AND ACETAMINOPHEN 5; 325 MG/1; MG/1
1 TABLET ORAL EVERY 4 HOURS PRN
Status: DISCONTINUED | OUTPATIENT
Start: 2023-09-20 | End: 2023-09-21 | Stop reason: HOSPADM

## 2023-09-20 RX ORDER — TEMAZEPAM 15 MG/1
15 CAPSULE ORAL NIGHTLY PRN
Status: DISCONTINUED | OUTPATIENT
Start: 2023-09-20 | End: 2023-09-21 | Stop reason: HOSPADM

## 2023-09-20 RX ORDER — CEFAZOLIN SODIUM 2 G/100ML
2 INJECTION, SOLUTION INTRAVENOUS ONCE
Status: COMPLETED | OUTPATIENT
Start: 2023-09-20 | End: 2023-09-20

## 2023-09-20 RX ORDER — HYDROMORPHONE HYDROCHLORIDE 1 MG/ML
0.5 INJECTION, SOLUTION INTRAMUSCULAR; INTRAVENOUS; SUBCUTANEOUS
Status: DISCONTINUED | OUTPATIENT
Start: 2023-09-20 | End: 2023-09-21 | Stop reason: HOSPADM

## 2023-09-20 RX ORDER — SODIUM CHLORIDE 0.9 % (FLUSH) 0.9 %
10 SYRINGE (ML) INJECTION EVERY 12 HOURS SCHEDULED
Status: DISCONTINUED | OUTPATIENT
Start: 2023-09-20 | End: 2023-09-20 | Stop reason: HOSPADM

## 2023-09-20 RX ORDER — SODIUM CHLORIDE 0.9 % (FLUSH) 0.9 %
10 SYRINGE (ML) INJECTION EVERY 12 HOURS SCHEDULED
Status: DISCONTINUED | OUTPATIENT
Start: 2023-09-20 | End: 2023-09-21 | Stop reason: HOSPADM

## 2023-09-20 RX ORDER — SODIUM CHLORIDE 9 MG/ML
40 INJECTION, SOLUTION INTRAVENOUS AS NEEDED
Status: DISCONTINUED | OUTPATIENT
Start: 2023-09-20 | End: 2023-09-21 | Stop reason: HOSPADM

## 2023-09-20 RX ORDER — EPHEDRINE SULFATE 50 MG/ML
INJECTION, SOLUTION INTRAVENOUS AS NEEDED
Status: DISCONTINUED | OUTPATIENT
Start: 2023-09-20 | End: 2023-09-20 | Stop reason: SURG

## 2023-09-20 RX ORDER — ROCURONIUM BROMIDE 10 MG/ML
INJECTION, SOLUTION INTRAVENOUS AS NEEDED
Status: DISCONTINUED | OUTPATIENT
Start: 2023-09-20 | End: 2023-09-20 | Stop reason: SURG

## 2023-09-20 RX ORDER — PHENYLEPHRINE HYDROCHLORIDE 10 MG/ML
INJECTION INTRAVENOUS AS NEEDED
Status: DISCONTINUED | OUTPATIENT
Start: 2023-09-20 | End: 2023-09-20 | Stop reason: SURG

## 2023-09-20 RX ORDER — SODIUM CHLORIDE, SODIUM LACTATE, POTASSIUM CHLORIDE, CALCIUM CHLORIDE 600; 310; 30; 20 MG/100ML; MG/100ML; MG/100ML; MG/100ML
9 INJECTION, SOLUTION INTRAVENOUS CONTINUOUS
Status: DISCONTINUED | OUTPATIENT
Start: 2023-09-20 | End: 2023-09-21 | Stop reason: HOSPADM

## 2023-09-20 RX ORDER — IBUPROFEN 800 MG/1
800 TABLET ORAL ONCE
Status: COMPLETED | OUTPATIENT
Start: 2023-09-20 | End: 2023-09-20

## 2023-09-20 RX ORDER — BUPROPION HYDROCHLORIDE 150 MG/1
150 TABLET ORAL EVERY MORNING
Status: DISCONTINUED | OUTPATIENT
Start: 2023-09-21 | End: 2023-09-21 | Stop reason: HOSPADM

## 2023-09-20 RX ORDER — SODIUM CHLORIDE 9 MG/ML
9 INJECTION, SOLUTION INTRAVENOUS ONCE
Status: COMPLETED | OUTPATIENT
Start: 2023-09-20 | End: 2023-09-20

## 2023-09-20 RX ORDER — FENTANYL CITRATE 50 UG/ML
50 INJECTION, SOLUTION INTRAMUSCULAR; INTRAVENOUS
Status: DISCONTINUED | OUTPATIENT
Start: 2023-09-20 | End: 2023-09-20 | Stop reason: HOSPADM

## 2023-09-20 RX ORDER — ONDANSETRON 2 MG/ML
4 INJECTION INTRAMUSCULAR; INTRAVENOUS ONCE AS NEEDED
Status: DISCONTINUED | OUTPATIENT
Start: 2023-09-20 | End: 2023-09-20 | Stop reason: HOSPADM

## 2023-09-20 RX ORDER — HYDROCODONE BITARTRATE AND ACETAMINOPHEN 5; 325 MG/1; MG/1
1-2 TABLET ORAL EVERY 8 HOURS PRN
Qty: 30 TABLET | Refills: 0 | Status: SHIPPED | OUTPATIENT
Start: 2023-09-20

## 2023-09-20 RX ORDER — DEXAMETHASONE SODIUM PHOSPHATE 4 MG/ML
INJECTION, SOLUTION INTRA-ARTICULAR; INTRALESIONAL; INTRAMUSCULAR; INTRAVENOUS; SOFT TISSUE AS NEEDED
Status: DISCONTINUED | OUTPATIENT
Start: 2023-09-20 | End: 2023-09-20 | Stop reason: SURG

## 2023-09-20 RX ORDER — SODIUM CHLORIDE 0.9 % (FLUSH) 0.9 %
10 SYRINGE (ML) INJECTION AS NEEDED
Status: DISCONTINUED | OUTPATIENT
Start: 2023-09-20 | End: 2023-09-21 | Stop reason: HOSPADM

## 2023-09-20 RX ORDER — ONDANSETRON 2 MG/ML
4 INJECTION INTRAMUSCULAR; INTRAVENOUS EVERY 6 HOURS PRN
Status: DISCONTINUED | OUTPATIENT
Start: 2023-09-20 | End: 2023-09-21 | Stop reason: HOSPADM

## 2023-09-20 RX ORDER — LIDOCAINE HYDROCHLORIDE 10 MG/ML
0.5 INJECTION, SOLUTION EPIDURAL; INFILTRATION; INTRACAUDAL; PERINEURAL ONCE AS NEEDED
Status: COMPLETED | OUTPATIENT
Start: 2023-09-20 | End: 2023-09-20

## 2023-09-20 RX ORDER — DROPERIDOL 2.5 MG/ML
0.62 INJECTION, SOLUTION INTRAMUSCULAR; INTRAVENOUS ONCE AS NEEDED
Status: DISCONTINUED | OUTPATIENT
Start: 2023-09-20 | End: 2023-09-20 | Stop reason: HOSPADM

## 2023-09-20 RX ORDER — ACETAMINOPHEN 325 MG/1
650 TABLET ORAL ONCE
Status: COMPLETED | OUTPATIENT
Start: 2023-09-20 | End: 2023-09-20

## 2023-09-20 RX ORDER — BUPIVACAINE HYDROCHLORIDE 2.5 MG/ML
INJECTION, SOLUTION EPIDURAL; INFILTRATION; INTRACAUDAL AS NEEDED
Status: DISCONTINUED | OUTPATIENT
Start: 2023-09-20 | End: 2023-09-20 | Stop reason: HOSPADM

## 2023-09-20 RX ORDER — ACETAMINOPHEN 325 MG/1
650 TABLET ORAL EVERY 4 HOURS PRN
Status: DISCONTINUED | OUTPATIENT
Start: 2023-09-20 | End: 2023-09-21 | Stop reason: HOSPADM

## 2023-09-20 RX ORDER — MIDAZOLAM HYDROCHLORIDE 1 MG/ML
1 INJECTION INTRAMUSCULAR; INTRAVENOUS
Status: DISCONTINUED | OUTPATIENT
Start: 2023-09-20 | End: 2023-09-20 | Stop reason: HOSPADM

## 2023-09-20 RX ORDER — HYDROMORPHONE HYDROCHLORIDE 1 MG/ML
0.5 INJECTION, SOLUTION INTRAMUSCULAR; INTRAVENOUS; SUBCUTANEOUS
Status: DISCONTINUED | OUTPATIENT
Start: 2023-09-20 | End: 2023-09-20 | Stop reason: HOSPADM

## 2023-09-20 RX ORDER — FAMOTIDINE 20 MG/1
20 TABLET, FILM COATED ORAL
Status: COMPLETED | OUTPATIENT
Start: 2023-09-20 | End: 2023-09-20

## 2023-09-20 RX ORDER — LIDOCAINE HYDROCHLORIDE 10 MG/ML
INJECTION, SOLUTION EPIDURAL; INFILTRATION; INTRACAUDAL; PERINEURAL AS NEEDED
Status: DISCONTINUED | OUTPATIENT
Start: 2023-09-20 | End: 2023-09-20 | Stop reason: SURG

## 2023-09-20 RX ORDER — SODIUM CHLORIDE 9 MG/ML
INJECTION, SOLUTION INTRAVENOUS CONTINUOUS PRN
Status: DISCONTINUED | OUTPATIENT
Start: 2023-09-20 | End: 2023-09-20 | Stop reason: SURG

## 2023-09-20 RX ADMIN — PHENYLEPHRINE HYDROCHLORIDE 100 MCG: 10 INJECTION INTRAVENOUS at 16:05

## 2023-09-20 RX ADMIN — Medication 2000 MG: at 22:36

## 2023-09-20 RX ADMIN — FENTANYL CITRATE 100 MCG: 50 INJECTION, SOLUTION INTRAMUSCULAR; INTRAVENOUS at 15:53

## 2023-09-20 RX ADMIN — PREGABALIN 100 MG: 100 CAPSULE ORAL at 20:27

## 2023-09-20 RX ADMIN — DEXAMETHASONE SODIUM PHOSPHATE 8 MG: 4 INJECTION INTRA-ARTICULAR; INTRALESIONAL; INTRAMUSCULAR; INTRAVENOUS; SOFT TISSUE at 16:00

## 2023-09-20 RX ADMIN — HYDROCODONE BITARTRATE AND ACETAMINOPHEN 1 TABLET: 7.5; 325 TABLET ORAL at 12:54

## 2023-09-20 RX ADMIN — SACUBITRIL AND VALSARTAN 1 TABLET: 24; 26 TABLET, FILM COATED ORAL at 20:26

## 2023-09-20 RX ADMIN — ROCURONIUM BROMIDE 10 MG: 10 SOLUTION INTRAVENOUS at 17:07

## 2023-09-20 RX ADMIN — CEFAZOLIN SODIUM 2 G: 2 INJECTION, SOLUTION INTRAVENOUS at 15:55

## 2023-09-20 RX ADMIN — ROCURONIUM BROMIDE 50 MG: 10 SOLUTION INTRAVENOUS at 15:53

## 2023-09-20 RX ADMIN — PHENYLEPHRINE HYDROCHLORIDE 100 MCG: 10 INJECTION INTRAVENOUS at 16:36

## 2023-09-20 RX ADMIN — PROPOFOL 120 MG: 10 INJECTION, EMULSION INTRAVENOUS at 15:53

## 2023-09-20 RX ADMIN — IBUPROFEN 800 MG: 800 TABLET, FILM COATED ORAL at 12:54

## 2023-09-20 RX ADMIN — LIDOCAINE HYDROCHLORIDE 0.5 ML: 10 INJECTION, SOLUTION EPIDURAL; INFILTRATION; INTRACAUDAL; PERINEURAL at 12:54

## 2023-09-20 RX ADMIN — ROCURONIUM BROMIDE 10 MG: 10 SOLUTION INTRAVENOUS at 16:26

## 2023-09-20 RX ADMIN — Medication 10 ML: at 20:28

## 2023-09-20 RX ADMIN — METHOCARBAMOL 750 MG: 750 TABLET ORAL at 20:26

## 2023-09-20 RX ADMIN — EPHEDRINE SULFATE 10 MG: 50 INJECTION INTRAVENOUS at 16:10

## 2023-09-20 RX ADMIN — ACETAMINOPHEN 650 MG: 325 TABLET ORAL at 12:54

## 2023-09-20 RX ADMIN — LIDOCAINE HYDROCHLORIDE 50 MG: 10 INJECTION, SOLUTION EPIDURAL; INFILTRATION; INTRACAUDAL; PERINEURAL at 15:53

## 2023-09-20 RX ADMIN — PHENYLEPHRINE HYDROCHLORIDE 100 MCG: 10 INJECTION INTRAVENOUS at 16:09

## 2023-09-20 RX ADMIN — SODIUM CHLORIDE: 9 INJECTION, SOLUTION INTRAVENOUS at 15:45

## 2023-09-20 RX ADMIN — EPHEDRINE SULFATE 10 MG: 50 INJECTION INTRAVENOUS at 16:57

## 2023-09-20 RX ADMIN — SUGAMMADEX 200 MG: 100 INJECTION, SOLUTION INTRAVENOUS at 17:19

## 2023-09-20 RX ADMIN — FAMOTIDINE 20 MG: 20 TABLET ORAL at 12:54

## 2023-09-20 RX ADMIN — EPHEDRINE SULFATE 10 MG: 50 INJECTION INTRAVENOUS at 16:06

## 2023-09-20 RX ADMIN — PHENYLEPHRINE HYDROCHLORIDE 100 MCG: 10 INJECTION INTRAVENOUS at 16:44

## 2023-09-20 RX ADMIN — METOPROLOL SUCCINATE 12.5 MG: 25 TABLET, EXTENDED RELEASE ORAL at 20:27

## 2023-09-20 RX ADMIN — ONDANSETRON 4 MG: 2 INJECTION INTRAMUSCULAR; INTRAVENOUS at 17:16

## 2023-09-20 RX ADMIN — SODIUM CHLORIDE 9 ML/HR: 9 INJECTION, SOLUTION INTRAVENOUS at 13:06

## 2023-09-20 RX ADMIN — OXYCODONE HYDROCHLORIDE AND ACETAMINOPHEN 1 TABLET: 5; 325 TABLET ORAL at 20:27

## 2023-09-20 RX ADMIN — PHENYLEPHRINE HYDROCHLORIDE 0.2 MCG/KG/MIN: 10 INJECTION INTRAVENOUS at 16:12

## 2023-09-20 NOTE — OP NOTE
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  Decompression C3-4 complete laminectomies    2.  Bilateral foraminotomies C3-4      SURGEON:  Kurtis Howell M.D.      ASSISTANT: TINO Jackson was responsible for performing the following activities: Suction and their skilled assistance was necessary for the success of this case.    ANESTHESIA:  General      ESTIMATED BLOOD LOSS: 50 cc      SPECIMEN: None      DRAINS:  None    Findings-incidental/apparent complications-incidental pinhole durotomy was identified under that dorsal lamina where there was some stuck ligament placed in the midline this was repaired with a 6-0 Prolene          PROCEDURE IN DETAIL:  Is a 63-year-old gentleman who presented with severe cervical myelopathy tobacco use history and cervical spondylosis he was taken the operating room after recommendations for decompression given the severe spinal cord compression were noted I explained that this may be part of a secondary surgery given his potential for instability    He was taken the operating room and  preoperative antimicrobial prophylaxis he was rolled prone padded well midline incision was then made after after all the bony prominences were padded.  After sterile prep and drape and the incision dissection was carried down to the skin and subcutaneous tissues the paraspinal musculature was removed off the lamina and a retractor was placed at this point, high-speed bur was used to thin the posterior lamina C3 and 4 laminectomies were performed completely with bilateral foraminal decompression of the nerve roots at the C3-4 with particular attention to the right side under the inferior portion of the C4 lamina and its caudal direction and sending this with a bur it was noted that the ligament was absent and there was an area just under  that where there was a pinhole of arachnoid it was thinned out and weeping spinal fluid.  This was repaired at the resolution the case with a 6-0 Prolene in a bit of adhere is an DuraGen just to ensure there is no evidence of CSF leakage.    At the resolution of the case meticulous hemostasis was maintained the skin was closed in layers there is no immediate complications noted and I discussed the findings with the family        Kurtis NEGRON

## 2023-09-20 NOTE — ANESTHESIA PREPROCEDURE EVALUATION
Anesthesia Evaluation                  Airway   Mallampati: I  TM distance: >3 FB  Neck ROM: full  No difficulty expected  Dental      Pulmonary    (+) COPD,  Cardiovascular     ECG reviewed    (+) hypertension, CAD, CHF     ROS comment: Non ischemic cardiomyopath, ef 21    Neuro/Psych  GI/Hepatic/Renal/Endo    (+) renal disease    Musculoskeletal     Abdominal    Substance History      OB/GYN          Other                      Anesthesia Plan    ASA 4     general     (A line)  intravenous induction     Anesthetic plan, risks, benefits, and alternatives have been provided, discussed and informed consent has been obtained with: patient.    Plan discussed with CRNA.    CODE STATUS:

## 2023-09-20 NOTE — ANESTHESIA PROCEDURE NOTES
Airway  Urgency: elective    Date/Time: 9/20/2023 3:55 PM  Airway not difficult    General Information and Staff    Patient location during procedure: OR  CRNA/CAA: Junior BING Leong, CRNA    Indications and Patient Condition  Indications for airway management: airway protection    Preoxygenated: yes  MILS not maintained throughout  Mask difficulty assessment: 1 - vent by mask    Final Airway Details  Final airway type: endotracheal airway      Successful airway: ETT  Cuffed: yes   Successful intubation technique: direct laryngoscopy  Endotracheal tube insertion site: oral  Blade: Osman  Blade size: 3  ETT size (mm): 7.5  Cormack-Lehane Classification: grade I - full view of glottis  Placement verified by: chest auscultation and capnometry   Cuff volume (mL): 10  Measured from: lips  ETT/EBT  to lips (cm): 23  Number of attempts at approach: 1  Assessment: lips, teeth, and gum same as pre-op and atraumatic intubation    Additional Comments  Negative epigastric sounds, Breath sound equal bilaterally with symmetric chest rise and fall

## 2023-09-20 NOTE — H&P
Pre-Op H&P  Sam Wilhelm  [id number]  1960      Chief complaint: Neck Pain      Subjective:  Patient is a 63 y.o.male presents for scheduled surgery by Dr. Howell. He anticipates a CERVICAL LAMINECTOMY DECOMPRESSION POSTERIOR C3-4 today.  The patient endorses having neck pain and numbness in both of his arms for the past several months.  He states that the neck pain is worse in the morning.  The numbness radiates all the way from his shoulders to his fingers, and is present all the time, regardless of movement.  Nothing has helped to alleviate these symptoms.    Review of Systems:  Constitutional-- No fever, chills or sweats. No fatigue.  CV-- No chest pain, palpitation or syncope. +HTN, HLD.  Resp-- No SOB, cough, hemoptysis  Skin--No rashes or lesions      Allergies: No Known Allergies      Home Meds:  Medications Prior to Admission   Medication Sig Dispense Refill Last Dose    Ascorbic Acid (VITAMIN C PO) Take 1,000 mg by mouth 2 (Two) Times a Day.   9/20/2023 at 0700    atorvastatin (LIPITOR) 40 MG tablet Take 1 tablet by mouth Every Night.   9/19/2023 at 1930    buPROPion XL (WELLBUTRIN XL) 150 MG 24 hr tablet Take 1 tablet by mouth Every Morning.   9/19/2023 at 1930    chlorhexidine (HIBICLENS) 4 % external liquid Shower each day with solution for 5 days beginning 5 days before surgery. 120 mL 0 9/19/2023    Cyanocobalamin (VITAMIN B-12 IJ) Inject 1 mL into the appropriate muscle as directed by prescriber Every 30 (Thirty) Days.   Past Month    methocarbamol (ROBAXIN) 750 MG tablet Take 1 tablet by mouth Every 8 (Eight) Hours. 60 tablet 0 9/19/2023 at 1930    metoprolol succinate XL (TOPROL-XL) 25 MG 24 hr tablet Take 0.5 tablets by mouth Every Night. 60 tablet 0 9/19/2023 at 1930    mupirocin (BACTROBAN) 2 % nasal ointment Apply to the inside of each nostril with a cotton swab two times daily, morning and evening, for 5 days before surgery. 10 each 0 9/19/2023    pregabalin (LYRICA) 100 MG capsule  Take 1 capsule by mouth 2 (Two) Times a Day.   9/19/2023 at 1930    sacubitril-valsartan (ENTRESTO) 24-26 MG tablet Take 1 tablet by mouth Every 12 (Twelve) Hours. 60 tablet 0 9/19/2023 at 0700    spironolactone (ALDACTONE) 25 MG tablet Take 1 tablet by mouth Daily. (Patient taking differently: Take 0.5 tablets by mouth Daily.) 60 tablet 0 9/19/2023 at 1930    Cholecalciferol 25 MCG (1000 UT) tablet Take 2 tablets by mouth 2 (Two) Times a Day.       Melatonin 10 MG tablet Take 1 tablet by mouth Every Night.   More than a month         PMH:   Past Medical History:   Diagnosis Date    Arthritis     B12 deficiency     Cervical disc disorder     Been dealing with this a long time.    Cervical myelopathy with cervical radiculopathy 08/28/2023    CHF (congestive heart failure)     Claustrophobia     COPD (chronic obstructive pulmonary disease)     CTS (carpal tunnel syndrome)     Surgery on both hands    Elevated cholesterol     Essential hypertension 09/17/2020    Low back pain     RLS     PSH:    Past Surgical History:   Procedure Laterality Date    CARDIAC CATHETERIZATION N/A 09/18/2020    Procedure: LEFT HEART CATH;  Surgeon: Higinio Monroy MD;  [facility];  Service: Cardiovascular;  Laterality: N/A;    COLONOSCOPY      HAND SURGERY Bilateral     R x2, L x1       Immunization History:  Influenza: No  Pneumococcal: No  Tetanus: No  Covid : x3    Social History:   Tobacco:   Social History     Tobacco Use   Smoking Status Every Day    Packs/day: 0.50    Years: 45.00    Pack years: 22.50    Types: Cigarettes    Start date: 1/1/1974   Smokeless Tobacco Former      Alcohol:     Social History     Substance and Sexual Activity   Alcohol Use Yes    Alcohol/week: 30.0 standard drinks    Types: 30 Cans of beer per week    Comment: 4-6 BEER NIGHTLY         Physical Exam:/92 (BP Location: Right arm, Patient Position: Lying)   Pulse 60   Temp 97 °F (36.1 °C) (Temporal)   Resp 16    SpO2 97%       General Appearance:    Alert, cooperative, no distress, appears stated age   Head:    Normocephalic, without obvious abnormality, atraumatic   Lungs:     Clear to auscultation bilaterally, respirations unlabored    Heart:   Regular rate and rhythm, S1 and S2 normal    Abdomen:    Soft without tenderness   Extremities:   Extremities normal, atraumatic, no cyanosis or edema   Skin:   Skin color, texture, turgor normal, no rashes or lesions   Neurologic:   Grossly intact     Results Review:     LABS:  Lab Results   Component Value Date    WBC 11.80 (H) 09/15/2023    HGB 13.1 09/15/2023    HCT 38.9 09/15/2023    .9 (H) 09/15/2023     09/15/2023    NEUTROABS 6.70 09/17/2020    GLUCOSE 63 (L) 09/15/2023    BUN 16 09/15/2023    CREATININE 1.32 (H) 09/15/2023    EGFRIFNONA 53 (L) 09/25/2020     09/15/2023    K 4.5 09/15/2023     09/15/2023    CO2 27.0 09/15/2023    MG 2.1 09/25/2020    CALCIUM 9.8 09/15/2023    ALBUMIN 4.00 09/17/2020    AST 31 09/17/2020    ALT 20 09/17/2020    BILITOT 0.3 09/17/2020       RADIOLOGY:  Imaging Results (Last 72 Hours)       ** No results found for the last 72 hours. **            I reviewed the patient's new clinical results.      Impression:   Cervical myelopathy with cervical radiculopathy       Plan: CERVICAL LAMINECTOMY DECOMPRESSION POSTERIOR C3-4       Yvon Leong, APRN   9/20/2023   12:58 EDT

## 2023-09-20 NOTE — ANESTHESIA PROCEDURE NOTES
Arterial Line      Patient reassessed immediately prior to procedure    Patient location during procedure: pre-op   Line placed for hemodynamic monitoring.  Performed By   Anesthesiologist: Brenden Edmonds MD   Preanesthetic Checklist  Completed: patient identified, IV checked, site marked, risks and benefits discussed, surgical consent, monitors and equipment checked, pre-op evaluation and timeout performed  Arterial Line Prep    Sterile Tech: cap, gloves and sterile barriers  Prep: ChloraPrep  Patient monitoring: blood pressure monitoring, continuous pulse oximetry and EKG  Arterial Line Procedure   Laterality:left  Location:  radial artery  Catheter size: 20 G   Guidance: palpation technique  Number of attempts: 1  Successful placement: yes   Post Assessment   Dressing Type: line sutured, occlusive dressing applied, secured with tape and wrist guard applied.   Complications no  Circ/Move/Sens Assessment: normal and unchanged.   Patient Tolerance: patient tolerated the procedure well with no apparent complications

## 2023-09-20 NOTE — ANESTHESIA POSTPROCEDURE EVALUATION
Patient: Sam Wilhelm    Procedure Summary       Date: 09/20/23 Room / Location:  [facility]  [facility]  [facility]    Anesthesia Start: 1545 Anesthesia Stop: 1739    Procedure: CERVICAL LAMINECTOMY DECOMPRESSION POSTERIOR C3-4 (Spine Cervical) Diagnosis:       Myelopathy      (Myelopathy [G95.9])    Surgeons: Kurtis Howell MD Provider: Eron Arevalo Jr., MD    Anesthesia Type: general ASA Status: 4            Anesthesia Type: general    Vitals  Vitals Value Taken Time   /102 09/20/23 1739   Temp 97.9 °F (36.6 °C) 09/20/23 1739   Pulse 75 09/20/23 1739   Resp     SpO2 100 % 09/20/23 1739           Post Anesthesia Care and Evaluation    Patient location during evaluation: PACU  Patient participation: complete - patient participated  Level of consciousness: awake and alert  Pain management: adequate    Airway patency: patent  Anesthetic complications: No anesthetic complications  PONV Status: none  Cardiovascular status: hemodynamically stable and acceptable  Respiratory status: nonlabored ventilation, acceptable and nasal cannula  Hydration status: acceptable

## 2023-09-21 VITALS
SYSTOLIC BLOOD PRESSURE: 127 MMHG | HEART RATE: 70 BPM | OXYGEN SATURATION: 93 % | TEMPERATURE: 97.5 F | RESPIRATION RATE: 14 BRPM | DIASTOLIC BLOOD PRESSURE: 79 MMHG

## 2023-09-21 PROCEDURE — 97161 PT EVAL LOW COMPLEX 20 MIN: CPT

## 2023-09-21 RX ADMIN — SPIRONOLACTONE 25 MG: 25 TABLET ORAL at 08:09

## 2023-09-21 RX ADMIN — SACUBITRIL AND VALSARTAN 1 TABLET: 24; 26 TABLET, FILM COATED ORAL at 08:10

## 2023-09-21 RX ADMIN — OXYCODONE HYDROCHLORIDE AND ACETAMINOPHEN 1 TABLET: 5; 325 TABLET ORAL at 08:10

## 2023-09-21 RX ADMIN — Medication 2000 MG: at 06:25

## 2023-09-21 RX ADMIN — BUPROPION HYDROCHLORIDE 150 MG: 150 TABLET, FILM COATED, EXTENDED RELEASE ORAL at 06:25

## 2023-09-21 RX ADMIN — PREGABALIN 100 MG: 100 CAPSULE ORAL at 08:09

## 2023-09-21 RX ADMIN — METHOCARBAMOL 750 MG: 750 TABLET ORAL at 06:25

## 2023-09-21 NOTE — PLAN OF CARE
Goal Outcome Evaluation:  Plan of Care Reviewed With: patient        Progress: no change  Outcome Evaluation: PT eval performed: Pt presenting with decompression C3-4, instructed on cervical spinal precautions.  Pt able to transfer and ambulate supervision, walked 300', no AD.  Recommend home with assist      Anticipated Discharge Disposition (PT): home with assist

## 2023-09-21 NOTE — PLAN OF CARE
Problem: Adult Inpatient Plan of Care  Goal: Plan of Care Review  Outcome: Ongoing, Progressing  Goal: Patient-Specific Goal (Individualized)  Outcome: Ongoing, Progressing  Goal: Absence of Hospital-Acquired Illness or Injury  Outcome: Ongoing, Progressing  Intervention: Identify and Manage Fall Risk  Recent Flowsheet Documentation  Taken 9/21/2023 0200 by Irma Del Castillo RN  Safety Promotion/Fall Prevention:   assistive device/personal items within reach   clutter free environment maintained   nonskid shoes/slippers when out of bed   room organization consistent   safety round/check completed  Taken 9/21/2023 0000 by Irma Del Castillo RN  Safety Promotion/Fall Prevention:   assistive device/personal items within reach   clutter free environment maintained   nonskid shoes/slippers when out of bed   room organization consistent   safety round/check completed  Taken 9/20/2023 2200 by Irma Del Castillo RN  Safety Promotion/Fall Prevention:   assistive device/personal items within reach   clutter free environment maintained   fall prevention program maintained   nonskid shoes/slippers when out of bed   room organization consistent   safety round/check completed  Taken 9/20/2023 2000 by Irma Del Castillo RN  Safety Promotion/Fall Prevention:   assistive device/personal items within reach   clutter free environment maintained   fall prevention program maintained   nonskid shoes/slippers when out of bed   room organization consistent   safety round/check completed  Intervention: Prevent Skin Injury  Recent Flowsheet Documentation  Taken 9/21/2023 0200 by Irma Del Castillo RN  Body Position: position changed independently  Skin Protection: adhesive use limited  Taken 9/21/2023 0000 by Irma Del Castillo RN  Body Position: position changed independently  Skin Protection: adhesive use limited  Taken 9/20/2023 2200 by Irma Del Castillo RN  Body Position: position changed independently  Skin Protection: adhesive use limited  Taken 9/20/2023 2000 by  Irma Del Castillo RN  Body Position: position changed independently  Skin Protection: adhesive use limited  Intervention: Prevent and Manage VTE (Venous Thromboembolism) Risk  Recent Flowsheet Documentation  Taken 9/20/2023 2000 by Irma Del Castillo RN  VTE Prevention/Management:   bilateral   sequential compression devices on  Goal: Optimal Comfort and Wellbeing  Outcome: Ongoing, Progressing  Goal: Readiness for Transition of Care  Outcome: Ongoing, Progressing     Problem: Fall Injury Risk  Goal: Absence of Fall and Fall-Related Injury  Outcome: Ongoing, Progressing  Intervention: Promote Injury-Free Environment  Recent Flowsheet Documentation  Taken 9/21/2023 0200 by Irma Del Castillo RN  Safety Promotion/Fall Prevention:   assistive device/personal items within reach   clutter free environment maintained   nonskid shoes/slippers when out of bed   room organization consistent   safety round/check completed  Taken 9/21/2023 0000 by Irma Del Castillo RN  Safety Promotion/Fall Prevention:   assistive device/personal items within reach   clutter free environment maintained   nonskid shoes/slippers when out of bed   room organization consistent   safety round/check completed  Taken 9/20/2023 2200 by Irma Del Castillo RN  Safety Promotion/Fall Prevention:   assistive device/personal items within reach   clutter free environment maintained   fall prevention program maintained   nonskid shoes/slippers when out of bed   room organization consistent   safety round/check completed  Taken 9/20/2023 2000 by Irma Del Castillo RN  Safety Promotion/Fall Prevention:   assistive device/personal items within reach   clutter free environment maintained   fall prevention program maintained   nonskid shoes/slippers when out of bed   room organization consistent   safety round/check completed     Problem: Pain Acute  Goal: Acceptable Pain Control and Functional Ability  Outcome: Ongoing, Progressing     Problem: Bleeding (Spinal Surgery)  Goal: Absence of  Bleeding  Outcome: Ongoing, Progressing     Problem: Bowel Motility Impaired (Spinal Surgery)  Goal: Effective Bowel Elimination  Outcome: Ongoing, Progressing     Problem: Fluid and Electrolyte Imbalance (Spinal Surgery)  Goal: Fluid and Electrolyte Balance  Outcome: Ongoing, Progressing     Problem: Functional Ability Impaired (Spinal Surgery)  Goal: Optimal Functional Ability  Outcome: Ongoing, Progressing  Intervention: Optimize Functional Status  Recent Flowsheet Documentation  Taken 9/21/2023 0200 by Irma Del Castillo RN  Positioning/Transfer Devices:   pillows   in use  Taken 9/21/2023 0000 by Irma Del Castillo RN  Positioning/Transfer Devices:   pillows   in use  Taken 9/20/2023 2200 by Irma Del Castillo RN  Positioning/Transfer Devices:   pillows   in use  Taken 9/20/2023 2000 by Irma Del Castillo RN  Positioning/Transfer Devices:   pillows   in use     Problem: Infection (Spinal Surgery)  Goal: Absence of Infection Signs and Symptoms  Outcome: Ongoing, Progressing     Problem: Neurologic Impairment (Spinal Surgery)  Goal: Optimal Neurologic Function  Outcome: Ongoing, Progressing  Intervention: Optimize Neurologic Function  Recent Flowsheet Documentation  Taken 9/21/2023 0200 by Imra Del Castillo RN  Body Position: position changed independently  Taken 9/21/2023 0000 by Irma Del Castillo RN  Body Position: position changed independently  Taken 9/20/2023 2200 by Irma Del Castillo RN  Body Position: position changed independently  Taken 9/20/2023 2000 by Irma Del Castillo RN  Body Position: position changed independently     Problem: Ongoing Anesthesia Effects (Spinal Surgery)  Goal: Anesthesia/Sedation Recovery  Outcome: Ongoing, Progressing  Intervention: Optimize Anesthesia Recovery  Recent Flowsheet Documentation  Taken 9/21/2023 0200 by Irma Del Castillo RN  Safety Promotion/Fall Prevention:   assistive device/personal items within reach   clutter free environment maintained   nonskid shoes/slippers when out of bed   room organization  consistent   safety round/check completed  Taken 9/21/2023 0000 by Irma Del Castillo RN  Safety Promotion/Fall Prevention:   assistive device/personal items within reach   clutter free environment maintained   nonskid shoes/slippers when out of bed   room organization consistent   safety round/check completed  Taken 9/20/2023 2200 by Irma Del Castillo RN  Safety Promotion/Fall Prevention:   assistive device/personal items within reach   clutter free environment maintained   fall prevention program maintained   nonskid shoes/slippers when out of bed   room organization consistent   safety round/check completed  Taken 9/20/2023 2000 by Irma Del Castillo RN  Safety Promotion/Fall Prevention:   assistive device/personal items within reach   clutter free environment maintained   fall prevention program maintained   nonskid shoes/slippers when out of bed   room organization consistent   safety round/check completed     Problem: Pain (Spinal Surgery)  Goal: Acceptable Pain Control  Outcome: Ongoing, Progressing     Problem: Postoperative Nausea and Vomiting (Spinal Surgery)  Goal: Nausea and Vomiting Relief  Outcome: Ongoing, Progressing     Problem: Postoperative Urinary Retention (Spinal Surgery)  Goal: Effective Urinary Elimination  Outcome: Ongoing, Progressing     Problem: Respiratory Compromise (Spinal Surgery)  Goal: Effective Oxygenation and Ventilation  Outcome: Ongoing, Progressing  Intervention: Optimize Oxygenation and Ventilation  Recent Flowsheet Documentation  Taken 9/21/2023 0200 by Irma Del Castillo RN  Head of Bed (HOB) Positioning: HOB elevated  Taken 9/21/2023 0000 by Irma Del Castillo RN  Head of Bed (HOB) Positioning: HOB elevated  Taken 9/20/2023 2200 by Irma Del Castillo RN  Head of Bed (HOB) Positioning: HOB elevated  Taken 9/20/2023 2000 by Irma Del Castillo RN  Head of Bed (HOB) Positioning: HOB elevated   Goal Outcome Evaluation:

## 2023-09-21 NOTE — DISCHARGE INSTR - ACTIVITY
Up with assistance to prevent falls    Ok to remove dressing Friday and shower, no scrubbing incision    Notify Dr. Gomez office if you have a headache

## 2023-09-21 NOTE — PROGRESS NOTES
HOD# : 1    No events last night  Patient feeling better in his shoulders but does have posterior neck pain as expected post procedurally.    Patient denies any signs of low pressure headaches.    Patient still with some numbness tingling in his hands but feels better with his dexterity    Cervical myelopathy with cervical radiculopathy      Temp:  [96.5 °F (35.8 °C)-97.9 °F (36.6 °C)] 97.5 °F (36.4 °C)  Heart Rate:  [60-87] 70  Resp:  [14-16] 14  BP: (101-168)/() 127/79  I/O last 3 completed shifts:  In: 600 [I.V.:600]  Out: 450 [Urine:450]  No intake/output data recorded.  Vital signs were reviewed and documented in the chart      EXAM   There is no height or weight on file to calculate BMI.      Patient appeared in good neurologic function with normal comprehension   CN grossly intact  Moves all extremities to command\     strength equal bilaterally    DIAGNOSIS  1. Cervical myelopathy with cervical radiculopathy    2. Myelopathy          PLAN    DC home

## 2023-09-21 NOTE — THERAPY DISCHARGE NOTE
Patient Name: Sam Wilhelm  : 1960    MRN: [id number]                              Today's Date: 2023       Admit Date: 2023    Visit Dx:     ICD-10-CM ICD-9-CM   1. Cervical myelopathy with cervical radiculopathy  G95.9 721.1    M54.12    2. Myelopathy  G95.9 336.9     Patient Active Problem List   Diagnosis    Acute combined systolic and diastolic congestive heart failure    Essential hypertension    Macrocytosis without anemia    Alcoholism    Tobacco abuse    Renal insufficiency    Valvular heart disease    Coronary artery disease involving native coronary artery of native heart    Cervical myelopathy with cervical radiculopathy     Past Medical History:   Diagnosis Date    Arthritis     B12 deficiency     Cervical disc disorder     Been dealing with this a long time.    Cervical myelopathy with cervical radiculopathy 2023    CHF (congestive heart failure)     Claustrophobia     COPD (chronic obstructive pulmonary disease)     CTS (carpal tunnel syndrome)     Surgery on both hands    Elevated cholesterol     Essential hypertension 2020    Low back pain     RLS     Past Surgical History:   Procedure Laterality Date    CARDIAC CATHETERIZATION N/A 2020    Procedure: LEFT HEART CATH;  Surgeon: Higinio Monroy MD;  Location: [facility] CATH INVASIVE LOCATION;  Service: Cardiovascular;  Laterality: N/A;    COLONOSCOPY      HAND SURGERY Bilateral     R x2, L x1      General Information       Row Name 23 1204          Physical Therapy Time and Intention    Document Type discharge evaluation/summary  -KG     Mode of Treatment physical therapy  -KG       Row Name 23 1200          General Information    Patient Profile Reviewed yes  -KG     Prior Level of Function independent:;all household mobility;ADL's  -KG     Existing Precautions/Restrictions spinal  -KG     Barriers to Rehab none identified  -KG       Row Name 23 1209          Living Environment    People in  Home child(dick), dependent  -KG       Row Name 09/21/23 1205          Home Main Entrance    Number of Stairs, Main Entrance none  -KG       Row Name 09/21/23 1205          Stairs Within Home, Primary    Number of Stairs, Within Home, Primary none  -KG       Row Name 09/21/23 1205          Cognition    Orientation Status (Cognition) oriented x 4  -KG               User Key  (r) = Recorded By, (t) = Taken By, (c) = Cosigned By      Initials Name Provider Type    MIRIAM Aria Reich Physical Therapist                   Mobility       Row Name 09/21/23 1206          Bed Mobility    Bed Mobility supine-sit  -KG     Supine-Sit [place] (Bed Mobility) independent  -KG     Assistive Device (Bed Mobility) head of bed elevated  -KG     Comment, (Bed Mobility) cues log roll  -KG       Row Name 09/21/23 1206          Transfers    Comment, (Transfers) supervision  -KG       Row Name 09/21/23 1206          Sit-Stand Transfer    Sit-Stand [place] (Transfers) supervision  -KG     Assistive Device (Sit-Stand Transfers) other (see comments)  no AD  -KG       Row Name 09/21/23 1206          Gait/Stairs (Locomotion)    [place] Level (Gait) supervision  -KG     Assistive Device (Gait) other (see comments)  no AD  -KG     Distance in Feet (Gait) 300  -KG     Deviations/Abnormal Patterns (Gait) antalgic  -KG     Bilateral Gait Deviations forward flexed posture;heel strike decreased  -KG               User Key  (r) = Recorded By, (t) = Taken By, (c) = Cosigned By      Initials Name Provider Type    MIRIAM Aria Reich Physical Therapist                   Obj/Interventions       Row Name 09/21/23 1208          Strength Comprehensive (MMT)    General Manual Muscle Testing (MMT) Assessment no strength deficits identified  -KG       Row Name 09/21/23 1208          Motor Skills    Therapeutic Exercise other (see comments)  scapular squeezes, gentle chin tucks, shoulder circles x15  -KG       Row Name 09/21/23 1208           Balance    Balance Assessment standing dynamic balance  -KG     Dynamic Standing Balance supervision  -KG     Position/Device Used, Standing Balance unsupported  -KG     Balance Interventions standing;sit to stand  -KG               User Key  (r) = Recorded By, (t) = Taken By, (c) = Cosigned By      Initials Name Provider Type    MIRIAM Aria Reich Physical Therapist                   Goals/Plan    No documentation.                  Clinical Impression       Row Name 09/21/23 1208          Pain    Pretreatment Pain Rating 4/10  -KG     Posttreatment Pain Rating 4/10  -KG     Pain Location incisional  -KG     Pain Location - neck  -KG     Pain Intervention(s) Repositioned;Ambulation/increased activity  -KG       Row Name 09/21/23 1208          Plan of Care Review    Plan of Care Reviewed With patient  -KG     Progress no change  -KG     Outcome Evaluation PT eval performed: Pt presenting with decompression C3-4, instructed on cervical spinal precautions.  Pt able to transfer and ambulate supervision, walked 300', no AD.  Recommend home with assist  -KG       Row Name 09/21/23 1208          Therapy Assessment/Plan (PT)    Criteria for Skilled Interventions Met (PT) other (see comments)  pt d/c  -KG       Row Name 09/21/23 1208          Positioning and Restraints    Pre-Treatment Position in bed  -KG     Post Treatment Position bed  -KG     In Bed notified nsg;fowlers;call light within reach;encouraged to call for assist;exit alarm on;with family/caregiver  -KG               User Key  (r) = Recorded By, (t) = Taken By, (c) = Cosigned By      Initials Name Provider Type    Aria Garcia Physical Therapist                   Outcome Measures       Row Name 09/21/23 1210          How much help from another person do you currently need...    Turning from your back to your side while in flat bed without using bedrails? 3  -KG     Moving from lying on back to sitting on the side of a flat bed without bedrails? 3  -KG      Moving to and from a bed to a chair (including a wheelchair)? 4  -KG     Standing up from a chair using your arms (e.g., wheelchair, bedside chair)? 4  -KG     Climbing 3-5 steps with a railing? 3  -KG     To walk in hospital room? 3  -KG     AM-PAC 6 Clicks Score (PT) 20  -KG     Highest level of mobility 6 --> Walked 10 steps or more  -KG       Row Name 09/21/23 1210          Functional Assessment    Outcome Measure Options AM-PAC 6 Clicks Basic Mobility (PT)  -KG               User Key  (r) = Recorded By, (t) = Taken By, (c) = Cosigned By      Initials Name Provider Type    Aria Garcia Physical Therapist                    PT Recommendation and Plan     Plan of Care Reviewed With: patient  Progress: no change  Outcome Evaluation: PT eval performed: Pt presenting with decompression C3-4, instructed on cervical spinal precautions.  Pt able to transfer and ambulate supervision, walked 300', no AD.  Recommend home with assist     Time Calculation:         PT Charges       Row Name 09/21/23 1212             Time Calculation    Start Time 1015  -KG      PT Received On 09/21/23  -KG                User Key  (r) = Recorded By, (t) = Taken By, (c) = Cosigned By      Initials Name Provider Type    Aria Garcia Physical Therapist                  Therapy Charges for Today       Code Description Service Date Service Provider Modifiers Qty    28815315451 HC PT EVAL LOW COMPLEXITY 4 9/21/2023 Aria Reich GP 1            PT G-Codes  Outcome Measure Options: AM-PAC 6 Clicks Basic Mobility (PT)  AM-PAC 6 Clicks Score (PT): 20    PT Discharge Summary  Anticipated Discharge Disposition (PT): home with assist    Aria Reich  9/21/2023

## 2023-10-09 ENCOUNTER — OFFICE VISIT (OUTPATIENT)
Dept: NEUROSURGERY | Facility: CLINIC | Age: 63
End: 2023-10-09
Payer: COMMERCIAL

## 2023-10-09 VITALS — RESPIRATION RATE: 17 BRPM | TEMPERATURE: 97.8 F | BODY MASS INDEX: 22.07 KG/M2 | HEIGHT: 68 IN | WEIGHT: 145.6 LBS

## 2023-10-09 DIAGNOSIS — G95.20 CERVICAL SPINAL CORD COMPRESSION: Primary | ICD-10-CM

## 2023-10-09 PROCEDURE — 99024 POSTOP FOLLOW-UP VISIT: CPT | Performed by: PHYSICIAN ASSISTANT

## 2023-10-09 NOTE — LETTER
October 9, 2023     Patient: Sam Wilhelm   YOB: 1960   Date of Visit: 10/9/2023       To Whom It May Concern:    It is my medical opinion that Sam Wilhelm may return to work on 10/16 .      Sincerely,        Guy Barr PA-C    CC:   No Recipients

## 2023-10-09 NOTE — PROGRESS NOTES
Subjective   Patient ID: Sam Wilhelm is a 63 y.o. male is here today for follow-up for suture removal.    HPI:      Patient is a very nice 63-year-old smoker who had central cord compression with spinal cord bruising after chiropractic manipulation.  Patient was having quite a bit of pain into his shoulders and numbness in his hands that has actually improved after the cervical laminectomy was done.    Incision is clean dry no sign infection bleeding erythema.  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  Patient is able to modify his lifting bending and twisting per his report, instructions were given it to him to minimize lifting bending and twisting    The following portions of the patient's history were reviewed and updated as appropriate: allergies, current medications, past family history, past medical history, past social history, past surgical history and problem list.    Review of Systems   Constitutional:  Negative for activity change, appetite change, chills, diaphoresis, fatigue, fever and unexpected weight change.   HENT:  Negative for congestion, dental problem, drooling, ear discharge, ear pain, facial swelling, hearing loss, mouth sores, nosebleeds, postnasal drip, rhinorrhea, sinus pressure, sneezing, sore throat, tinnitus, trouble swallowing and voice change.    Eyes:  Negative for photophobia, pain, discharge, redness, itching and visual disturbance.   Respiratory:  Negative for apnea, cough, choking, chest tightness, shortness of breath, wheezing and stridor.    Cardiovascular:  Negative for chest pain, palpitations and leg swelling.   Gastrointestinal:  Negative for abdominal distention, abdominal pain, anal bleeding, blood in stool, constipation, diarrhea, nausea, rectal pain and vomiting.   Endocrine: Negative for cold intolerance, heat intolerance, polydipsia, polyphagia and polyuria.  "  Genitourinary:  Negative for decreased urine volume, difficulty urinating, dysuria, enuresis, flank pain, frequency, genital sores, hematuria and urgency.   Musculoskeletal:  Positive for neck pain. Negative for arthralgias, back pain, gait problem, joint swelling, myalgias and neck stiffness.   Skin:  Negative for color change, pallor, rash and wound.   Allergic/Immunologic: Negative for environmental allergies, food allergies and immunocompromised state.   Neurological:  Negative for dizziness, tremors, seizures, syncope, facial asymmetry, speech difficulty, weakness, light-headedness, numbness and headaches.   Hematological:  Negative for adenopathy. Does not bruise/bleed easily.   Psychiatric/Behavioral:  Negative for agitation, behavioral problems, confusion, decreased concentration, dysphoric mood, hallucinations, self-injury, sleep disturbance and suicidal ideas. The patient is not nervous/anxious and is not hyperactive.    All other systems reviewed and are negative.        Objective    reports that he has been smoking cigarettes. He started smoking about 49 years ago. He has a 22.50 pack-year smoking history. He has quit using smokeless tobacco. He reports current alcohol use of about 20.0 standard drinks of alcohol per week. He reports current drug use. Drug: Marijuana.   SMOKING STATUS: I spent greater than 10 minutes educating the patient on smoking cessation, and discussed how smoking pertains to the particular disease process at hand.  The patient acknowledges understanding.      Physical Exam:   Vitals:Temp 97.8 øF (36.6 øC) (Infrared)   Resp 17   Ht 172.7 cm (68\")   Wt 66 kg (145 lb 9.6 oz)   BMI 22.14 kg/mý    BMI: Body mass index is 22.14 kg/mý.     GENERAL:   The patient is in no acute distress, and is able to answer all questions appropriately.  Skin:  The incision is well-healed and well approximated.  No signs of infection, bleeding, or erythema.  Musculoskeletal:     strength is 5 " out of 5 bilaterally.   Shoulder abduction is 5 out of 5.    Dorsiflexion is 5/5 Bilaterally  Plantarflexion is 5/5 bilaterally  Hip Flexion 5/5 bilaterally.    The patient's gait is normal without antalgia.  Neurologic:   The patient is alert and oriented by 3.     Pupils are equal and reactive to light.    Visual fields are full.    Extraocular movements are intact without nystagmus.    There is no evidence of central motor drift. No facial droop.  No difficulty with rapid alternating movements.    Sensation is equal bilaterally with no deficit.      Reflexes:  3+ @ biceps, triceps, brachioradialis, as well as the patellar and Achilles tendon bilaterally.    Assessment & Plan   Independent Review of Radiographic Studies:    No new films reviewed at this visit  Medical Decision Making:    Physical therapy will be ordered.  Patient will attend to hopefully gain some strength and some dexterity back.    We will be happy to see the patient back in about 4 weeks following therapy.    Patient is understanding that with his spinal cord bruise it can take up to a year to fully recover and he may never be 100%..    BMI is within normal parameters. No other follow-up for BMI required.    Diagnoses and all orders for this visit:    1. Cervical spinal cord compression (Primary)  -     Ambulatory Referral to Physical Therapy Evaluate and treat (cervical myelopathy)      Return in about 4 weeks (around 11/6/2023).         Answers submitted by the patient for this visit:  Primary Reason for Visit (Submitted on 10/3/2023)  What is the primary reason for your visit?: Other  Other (Submitted on 10/3/2023)  Please describe your symptoms.: Follow-up from surgery and removal of stitches  Have you had these symptoms before?: No  How long have you been having these symptoms?: Greater than 2 weeks

## 2023-10-18 ENCOUNTER — TELEPHONE (OUTPATIENT)
Dept: NEUROSURGERY | Facility: CLINIC | Age: 63
End: 2023-10-18

## 2023-10-18 NOTE — TELEPHONE ENCOUNTER
Provider:  Dante  Surgery/Procedure:  Cervical laminectomy decompression posterior C3-4  Surgery/Procedure Date:  9/20/23  Last visit:   10/9/23  Next visit: 11/9/23     Reason for call:  Patient requests a muscle relaxer called in to [email or web address] pharmacy. He was carrying some heavy buckets a few days ago and has had pain and tightness in his neck since then.

## 2023-10-20 RX ORDER — METHOCARBAMOL 750 MG/1
750 TABLET, FILM COATED ORAL 4 TIMES DAILY PRN
Qty: 30 TABLET | Refills: 0 | Status: SHIPPED | OUTPATIENT
Start: 2023-10-20 | End: 2023-11-08

## 2023-10-20 NOTE — TELEPHONE ENCOUNTER
Needs to modify activity. Dont use MRs to get back to normal activity.    I will call in a short course

## 2023-11-08 RX ORDER — METHOCARBAMOL 750 MG/1
750 TABLET, FILM COATED ORAL 4 TIMES DAILY PRN
Qty: 30 TABLET | Refills: 0 | Status: SHIPPED | OUTPATIENT
Start: 2023-11-08

## 2023-11-10 ENCOUNTER — OFFICE VISIT (OUTPATIENT)
Dept: NEUROSURGERY | Facility: CLINIC | Age: 63
End: 2023-11-10
Payer: COMMERCIAL

## 2023-11-10 VITALS
DIASTOLIC BLOOD PRESSURE: 78 MMHG | WEIGHT: 145 LBS | TEMPERATURE: 97.1 F | BODY MASS INDEX: 21.98 KG/M2 | SYSTOLIC BLOOD PRESSURE: 120 MMHG | HEIGHT: 68 IN

## 2023-11-10 DIAGNOSIS — G95.20 CERVICAL SPINAL CORD COMPRESSION: Primary | ICD-10-CM

## 2023-11-10 PROCEDURE — 99024 POSTOP FOLLOW-UP VISIT: CPT | Performed by: PHYSICIAN ASSISTANT

## 2023-11-10 RX ORDER — CYANOCOBALAMIN 1000 UG/ML
1000 INJECTION, SOLUTION INTRAMUSCULAR; SUBCUTANEOUS
COMMUNITY
Start: 2023-10-26

## 2023-11-10 RX ORDER — PREGABALIN 150 MG/1
150 CAPSULE ORAL
COMMUNITY
Start: 2023-11-08

## 2023-11-10 NOTE — PROGRESS NOTES
"Patient: Sam Wilhelm  : 1960    Primary Care Provider: Steve Bhatia MD      Chief Complaint: Hands numb arms hurt neck pain    History of Present Illness:       Patient is a nice 63-year-old gentleman known to the neurosurgical practice for having cervical myelopathy which rapidly progressed after chiropractic adjustment.  Patient required a cervical laminectomy at C3-4.  Procedure was done on 2023.    Patient is doing well but with hand numbness at the initial visit where sutures were removed.  Sutures came out without incident at that point.  Now the incision is well-healed well approximated.  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  Unfortunately he has been overdoing it quite a bit and his neck is been quite sore which causes some referred pain from his hands up into his arms.  Patient also has noticed that some of his \"restless leg syndrome\" in the lower extremities has worsened since the \"overdoing it episode\".     As always patient is resting a lot of this pain is quite a bit better.  I encouraged him to limit his activities at work.    We will plan on seeing him back in another couple months to check in on how he is doing from a myelopathy standpoint.  Patient understands he may never be the same but is can take up to a year to get him fully recovered    Review of Systems   Musculoskeletal:  Positive for neck pain and neck stiffness.   Neurological:  Positive for numbness.       Past Medical History:     Past Medical History:   Diagnosis Date    Arthritis     B12 deficiency     Cervical disc disorder     Been dealing with this a long time.    Cervical myelopathy with cervical radiculopathy 2023    CHF (congestive heart failure)     Claustrophobia     COPD (chronic obstructive pulmonary disease)     CTS (carpal tunnel syndrome)     Surgery on both hands    Elevated cholesterol     Essential " "hypertension 09/17/2020    Hyperlipidemia     Low back pain     RLS       Family History:     Family History   Problem Relation Age of Onset    Dementia Mother     Other Mother         Alzheimer's    Heart disease Father     Stroke Father     Heart failure Father     Diabetes Father     No Known Problems Sister     No Known Problems Brother     No Known Problems Maternal Grandmother     No Known Problems Maternal Grandfather     No Known Problems Paternal Grandmother     No Known Problems Paternal Grandfather        Social History:    reports that he has been smoking cigarettes. He started smoking about 49 years ago. He has a 22.50 pack-year smoking history. He has quit using smokeless tobacco. He reports current alcohol use of about 20.0 standard drinks of alcohol per week. He reports current drug use. Drug: Marijuana.   SMOKING STATUS: I spent greater than 10 minutes educating the patient on smoking cessation, and discussed how smoking pertains to the particular disease process at hand.  The patient acknowledges understanding.      Surgical History:     Past Surgical History:   Procedure Laterality Date    CARDIAC CATHETERIZATION N/A 09/18/2020    Procedure: LEFT HEART CATH;  Surgeon: Higinio Monroy MD;  [facility];  Service: Cardiovascular;  Laterality: N/A;    CARPAL TUNNEL RELEASE      CERVICAL LAMINECTOMY DECOMPRESSION POSTERIOR N/A 09/20/2023    Procedure: CERVICAL LAMINECTOMY DECOMPRESSION POSTERIOR C3-4;  Surgeon: Kurtis Howell MD;  Location:  [facility];  Service: Neurosurgery;  Laterality: N/A;    COLONOSCOPY      HAND SURGERY Bilateral     R x2, L x1       Allergies:   Patient has no known allergies.    Physical Exam:    Vital Signs:/78 (BP Location: Left arm, Patient Position: Sitting, Cuff Size: Adult)   Temp 97.1 °F (36.2 °C) (Infrared)   Ht 172.7 cm (68\")   Wt 65.8 kg (145 lb)   BMI 22.05 kg/m²    BMI: Body mass index is 22.05 kg/m².     GENERAL:   " "The patient is in no acute distress, and is able to answer all questions appropriately.  Skin:  The incision is well-healed and well approximated.  No signs of infection, bleeding, or erythema.  Musculoskeletal:     strength is 5 out of 5 bilaterally.   Shoulder abduction is 5 out of 5.    Dorsiflexion is 5/5 Bilaterally  Plantarflexion is 5/5 bilaterally  Hip Flexion 5/5 bilaterally.    The patient´s gait is normal without antalgia.  Neurologic:   The patient is alert and oriented by 3.     Pupils are equal and reactive to light.    Visual fields are full.    Extraocular movements are intact without nystagmus.    There is no evidence of central motor drift. No facial droop.  No difficulty with rapid alternating movements.    Sensation is equal bilaterally with no deficit.      Reflexes:  + @ biceps, triceps, brachioradialis, as well as the patellar and Achilles tendon bilaterally.  Izquierdo's positive bilaterally    Medical Decision Making    Data Review:   No new films reviewed at this visit    Diagnosis:   Cervical stenosis  Cervical myelopathy  Status post C3-C4 cervical laminectomy  Smoking    Treatment Options:   We will see him back in a couple of weeks.  Patient understands that he needs to manage his activities little bit tighter.  Patient is going to try some physical therapy in the meantime which I think is reasonable. We can set him up with pain management as well.    Dante has requested to see him back in a couple weeks with a flex and ext xr.    From my standpoint he is to give this time to allow it to heal and over the years time we will see where he ends up.  Patient understands that there is likely some deficit they will continue to notice even after he is \"fully healed\".    BMI is within normal parameters. No other follow-up for BMI required.     Diagnosis Plan   1. Cervical spinal cord compression          Answers submitted by the patient for this visit:  Primary Reason for Visit (Submitted on " 11/7/2023)  What is the primary reason for your visit?: Other  Other (Submitted on 11/7/2023)  Please describe your symptoms.: Follow up visit  Have you had these symptoms before?: Yes  How long have you been having these symptoms?: Greater than 2 weeks

## 2023-11-16 RX ORDER — METHOCARBAMOL 750 MG/1
750 TABLET, FILM COATED ORAL 4 TIMES DAILY PRN
Qty: 30 TABLET | Refills: 0 | Status: SHIPPED | OUTPATIENT
Start: 2023-11-16

## 2023-11-30 RX ORDER — METHOCARBAMOL 750 MG/1
750 TABLET, FILM COATED ORAL 4 TIMES DAILY PRN
Qty: 30 TABLET | Refills: 0 | Status: SHIPPED | OUTPATIENT
Start: 2023-11-30

## 2023-11-30 NOTE — TELEPHONE ENCOUNTER
Provider: Momo    Caller: Josué    Surgery:  Surgery with Kurtis Howell MD (09/20/2023)     Last visit: Office Visit with Guy Barr PA-C (11/10/2023)    Next visit: Appointment with Guy Barr PA-C (02/12/2024)   Last filled:       Reason for call:         Requested Prescriptions     Pending Prescriptions Disp Refills    methocarbamol (ROBAXIN) 750 MG tablet [Pharmacy Med Name: METHOCARBAMOL 750MG TABLETS] 30 tablet 0     Sig: TAKE 1 TABLET BY MOUTH FOUR TIMES DAILY AS NEEDED FOR MUSCLE SPASMS

## 2023-12-14 RX ORDER — METHOCARBAMOL 750 MG/1
750 TABLET, FILM COATED ORAL 4 TIMES DAILY PRN
Qty: 30 TABLET | Refills: 0 | Status: SHIPPED | OUTPATIENT
Start: 2023-12-14

## 2023-12-27 RX ORDER — METHOCARBAMOL 750 MG/1
750 TABLET, FILM COATED ORAL 4 TIMES DAILY PRN
Qty: 30 TABLET | Refills: 0 | Status: SHIPPED | OUTPATIENT
Start: 2023-12-27

## 2023-12-27 NOTE — TELEPHONE ENCOUNTER
Refill request  Requested Prescriptions     Pending Prescriptions Disp Refills    methocarbamol (ROBAXIN) 750 MG tablet [Pharmacy Med Name: METHOCARBAMOL 750MG TABLETS] 30 tablet 0     Sig: TAKE 1 TABLET BY MOUTH FOUR TIMES DAILY AS NEEDED FOR MUSCLE SPASMS         Provider:  Dr. Howell/Guy   Surgery/Procedure:    CERVICAL LAMINECTOMY DECOMPRESSION POSTERIOR C3-4       Surgery/Procedure Date:  Surgery with Kurtis Howell MD (09/20/2023)   Last visit:   Office Visit with Guy Barr PA-C (11/10/2023)   Next visit: Appointment with Guy Barr PA-C (02/12/2024)      Patient is evaluated in our office for neck and hand pain

## 2023-12-29 RX ORDER — METHOCARBAMOL 750 MG/1
750 TABLET, FILM COATED ORAL 4 TIMES DAILY PRN
Qty: 30 TABLET | Refills: 0 | OUTPATIENT
Start: 2023-12-29

## 2024-01-10 ENCOUNTER — LAB (OUTPATIENT)
Dept: LAB | Facility: HOSPITAL | Age: 64
End: 2024-01-10
Payer: COMMERCIAL

## 2024-01-10 ENCOUNTER — OFFICE VISIT (OUTPATIENT)
Dept: PAIN MEDICINE | Facility: CLINIC | Age: 64
End: 2024-01-10
Payer: COMMERCIAL

## 2024-01-10 VITALS — WEIGHT: 155.6 LBS | HEIGHT: 68 IN | BODY MASS INDEX: 23.58 KG/M2

## 2024-01-10 DIAGNOSIS — G99.2 MYELOPATHY CONCURRENT WITH AND DUE TO SPINAL STENOSIS OF CERVICAL REGION: Primary | ICD-10-CM

## 2024-01-10 DIAGNOSIS — M48.02 MYELOPATHY CONCURRENT WITH AND DUE TO SPINAL STENOSIS OF CERVICAL REGION: Primary | ICD-10-CM

## 2024-01-10 DIAGNOSIS — Z51.81 ENCOUNTER FOR THERAPEUTIC DRUG LEVEL MONITORING: ICD-10-CM

## 2024-01-10 DIAGNOSIS — Z51.81 ENCOUNTER FOR THERAPEUTIC DRUG LEVEL MONITORING: Primary | ICD-10-CM

## 2024-01-10 LAB
AMPHET+METHAMPHET UR QL: NEGATIVE
AMPHETAMINES UR QL: NEGATIVE
BARBITURATES UR QL SCN: NEGATIVE
BENZODIAZ UR QL SCN: NEGATIVE
BUPRENORPHINE SERPL-MCNC: NEGATIVE NG/ML
CANNABINOIDS SERPL QL: POSITIVE
COCAINE UR QL: NEGATIVE
FENTANYL UR-MCNC: NEGATIVE NG/ML
METHADONE UR QL SCN: NEGATIVE
OPIATES UR QL: POSITIVE
OXYCODONE UR QL SCN: NEGATIVE
PCP UR QL SCN: NEGATIVE
TRICYCLICS UR QL SCN: NEGATIVE

## 2024-01-10 PROCEDURE — 99204 OFFICE O/P NEW MOD 45 MIN: CPT | Performed by: STUDENT IN AN ORGANIZED HEALTH CARE EDUCATION/TRAINING PROGRAM

## 2024-01-10 PROCEDURE — 80307 DRUG TEST PRSMV CHEM ANLYZR: CPT

## 2024-01-10 RX ORDER — ONDANSETRON 4 MG/1
TABLET, ORALLY DISINTEGRATING ORAL
COMMUNITY
Start: 2023-11-17

## 2024-01-10 RX ORDER — HYDROCODONE BITARTRATE AND ACETAMINOPHEN 5; 325 MG/1; MG/1
1 TABLET ORAL EVERY 12 HOURS SCHEDULED
COMMUNITY
Start: 2023-12-14

## 2024-01-10 RX ORDER — PREGABALIN 150 MG/1
150 CAPSULE ORAL 2 TIMES DAILY
Qty: 60 CAPSULE | Refills: 2 | Status: SHIPPED | OUTPATIENT
Start: 2024-01-10

## 2024-01-10 NOTE — PROGRESS NOTES
Referring Physician: Guy Barr PA-C  [address] [facility]  [address]  [place] [id number]    Primary Physician: Steve Bhatia MD    CHIEF COMPLAINT or REASON FOR VISIT: Neck Pain (New patient)      Initial history of present illness on 01/10/2024:  Mr. Sam Wilhelm is 63 y.o. male who presents as a new patient referral for evaluation and treatment of chronic bilateral hand pain.  He describes a many year history of increasing bilateral hand and forearm pain radiating up his arms.  He ultimately was evaluated by neurosurgery and diagnosed with myelopathy secondary to C3/4 stenosis.  He underwent cervical decompression in September 2023 with Dr. Howell.  He states that since that time he no longer has the increasing pain up proximal to his elbows but continues to complain of significant bilateral hand and forearm burning pain.  He denies weakness.  He has been managed for this issue with pregabalin and hydrocodone with some benefit.  He has previously undergone bilateral carpal tunnel surgery.  He was recently evaluated by Guy Barr PA-C with neurosurgery who referred for pain management.    Interval history:    Interventions:      Objective Pain Scoring:   BRIEF PAIN INVENTORY:  Total score:   Pain Score    01/10/24 0839   PainSc:   7   PainLoc: Neck      PHQ-2: PHQ-2 Total Score: 1  PHQ-9: PHQ-9: Brief Depression Severity Measure Score: 1  Opioid Risk Tool:         Review of Systems:   ROS negative except as otherwise noted     Past Medical History:   Past Medical History:   Diagnosis Date    Arthritis     B12 deficiency     Cervical disc disorder     Been dealing with this a long time.    Cervical myelopathy with cervical radiculopathy 08/28/2023    CHF (congestive heart failure)     Claustrophobia     COPD (chronic obstructive pulmonary disease)     Coronary artery disease involving native coronary artery of native heart 09/20/2020    CTS (carpal tunnel syndrome)     Surgery on both hands     Elevated cholesterol     Essential hypertension 09/17/2020    Hyperlipidemia     Joint pain     Low back pain     RLS    Neck pain     Renal insufficiency 09/18/2020         Past Surgical History:   Past Surgical History:   Procedure Laterality Date    CARDIAC CATHETERIZATION N/A 09/18/2020    Procedure: LEFT HEART CATH;  Surgeon: Higinio Monroy MD;  [facility];  Service: Cardiovascular;  Laterality: N/A;    CARPAL TUNNEL RELEASE      CERVICAL LAMINECTOMY DECOMPRESSION POSTERIOR N/A 09/20/2023    Procedure: CERVICAL LAMINECTOMY DECOMPRESSION POSTERIOR C3-4;  Surgeon: Kurtis Howell MD;  Location:  [facility];  Service: Neurosurgery;  Laterality: N/A;    COLONOSCOPY      HAND SURGERY Bilateral     R x2, L x1    NECK SURGERY           Family History   Family History   Problem Relation Age of Onset    Dementia Mother     Other Mother         Alzheimer's    Alzheimer's disease Mother     Heart disease Father     Stroke Father     Heart failure Father     Diabetes Father     Coronary artery disease Father     No Known Problems Sister     No Known Problems Brother     No Known Problems Maternal Grandmother     No Known Problems Maternal Grandfather     No Known Problems Paternal Grandmother     No Known Problems Paternal Grandfather          Social History   Social History     Socioeconomic History    Marital status:    Tobacco Use    Smoking status: Every Day     Packs/day: 0.50     Years: 45.00     Additional pack years: 0.00     Total pack years: 22.50     Types: Cigarettes     Start date: 1/1/1974    Smokeless tobacco: Former   Vaping Use    Vaping Use: Never used   Substance and Sexual Activity    Alcohol use: Yes     Alcohol/week: 20.0 standard drinks of alcohol     Types: 20 Cans of beer per week     Comment: 4-6 BEER NIGHTLY    Drug use: Yes     Types: Marijuana    Sexual activity: Yes     Partners: Female     Birth control/protection: None        Medications:  "    Current Outpatient Medications:     Ascorbic Acid (VITAMIN C PO), Take 1,000 mg by mouth 2 (Two) Times a Day., Disp: , Rfl:     atorvastatin (LIPITOR) 40 MG tablet, Take 1 tablet by mouth Every Night., Disp: , Rfl:     buPROPion XL (WELLBUTRIN XL) 150 MG 24 hr tablet, Take 1 tablet by mouth Every Morning., Disp: , Rfl:     cholecalciferol (VITAMIN D3) 25 MCG (1000 UT) tablet, Take 2 tablets by mouth 2 (Two) Times a Day., Disp: , Rfl:     cyanocobalamin 1000 MCG/ML injection, 1 mL., Disp: , Rfl:     HYDROcodone-acetaminophen (NORCO) 5-325 MG per tablet, Take 1 tablet by mouth Every 12 (Twelve) Hours., Disp: , Rfl:     Melatonin 10 MG tablet, Take 1 tablet by mouth Every Night., Disp: , Rfl:     methocarbamol (ROBAXIN) 750 MG tablet, Take 1 tablet by mouth 4 (Four) Times a Day As Needed for Muscle Spasms., Disp: 30 tablet, Rfl: 0    metoprolol succinate XL (TOPROL-XL) 25 MG 24 hr tablet, Take 0.5 tablets by mouth Every Night., Disp: 60 tablet, Rfl: 0    ondansetron ODT (ZOFRAN-ODT) 4 MG disintegrating tablet, DISSOLVE 1 TABLET ON THE TONGUE EVERY 4 HOURS FOR 3 DAYS, Disp: , Rfl:     pregabalin (LYRICA) 150 MG capsule, 1 capsule., Disp: , Rfl:     sacubitril-valsartan (ENTRESTO) 24-26 MG tablet, Take 1 tablet by mouth Every 12 (Twelve) Hours., Disp: 60 tablet, Rfl: 0    spironolactone (ALDACTONE) 25 MG tablet, Take 1 tablet by mouth Daily., Disp: 60 tablet, Rfl: 0        Physical Exam:     Vitals:    01/10/24 0839   Weight: 70.6 kg (155 lb 9.6 oz)   Height: 172.7 cm (68\")   PainSc:   7   PainLoc: Neck        General: Alert and oriented, No acute distress.   HEENT: Normocephalic, atraumatic.   Cardiovascular: No gross edema  Respiratory: Respirations are non-labored    Cervical Spine:   No masses or atrophy  Range of motion - Flexion normal. Extension normal. Lateral rotation normal.   Palpation - nontender   Spurling's - negative       Motor Exam:    Strength: Rate on 1-5 scale Right Left    C5-Elbow flexion, " Deltoid 5 5    C6-Wrist extension 5 5    C7- Elbow / finger extension 5 5    C8- Finger flexion 5 5    T1- Intrinsics hand 5 5    Sensory Exam: Full and equal sensation to light touch throughout.    Neurologic: Cranial Nerves II-XII are grossly intact.   Izquierdo’s -negative bilaterally   Psychiatric: Cooperative.   Gait: Normal   Assistive Devices: None    Imaging Studies:   No results found for this or any previous visit.      Impression & Plan:       01/10/2024: Sam Wilhelm is a 63 y.o. male with past medical history significant for HTN, alcoholism, tobacco abuse, cervical myelopathy status post C3/4 decompression with Dr. Howell in September 2023, who presents to the pain clinic for evaluation and treatment of chronic bilateral hand and forearm burning pain.  I personally reviewed and interpreted his preoperative cervical MRI dated August 18, 2023 demonstrating: C3/4 anterolisthesis with severe canal stenosis and cord edema.  Examination consistent with cervical myelopathy versus peripheral neuropathy versus nerve entrapment.  Obtain EMG/NCV to rule out EtOH peripheral neuropathy.  We discussed epidural steroid injection to improve pain.  If greater than 50% relief for at least 2-3 months can consider repeat as needed every 3 to 4 months.  I had a discussion with the patient regarding the risks of the procedure including bleeding, infection, damage to surrounding structures.  We discussed the potential adverse effects of corticosteroid injection including flushing of the face, lipodystrophy, skin discoloration, elevated blood glucose, increased blood pressure.  Risks of frequent steroid administration include weight gain, hormonal changes, mood changes, osteoporosis.  Additionally will continue pregabalin.  I do not recommend opioids for chronic nonmalignant pain given the risks of tolerance, dependence, addiction, hormonal imbalance, immunosuppression especially in the setting of EtOH addiction.    1.  Myelopathy concurrent with and due to spinal stenosis of cervical region        PLAN:  1. Medication Recommendations:   -Recommend Voltaren topical, NSAIDs, Tylenol.  Can trial turmeric 500 mg twice daily if NSAID contraindicated.    -Start pregabalin 150 mg twice daily #2 refills  -As part of this patient's treatment plan, patient will be prescribed controlled substances. The patient has been made aware of appropriate use of such medications, including potential risk of somnolence, limited ability to drive and /or work safely, and potential for dependence or overdose. It has also been made clear that these medications are for use by this patient only, without concomitant use of alcohol or other substances unless prescribed.Controlled substance status of medication discussed with patient, discussed risks of medication including abuse potential and diversion potential and need to follow up for reevaluation appointment in order to receive further refills.  [place] was reviewed and compliant.    2. Physical Therapy: Continue HEP    3. Psychological: defer    4. Complementary and alternative (CAM) Therapies:     5. Labs/Diagnostic studies: EMG/NCV of BUE ordered    6. Imaging: MRI independently interpreted and reviewed with patient    7. Interventions: Schedule cervical interlaminar epidural steroid injection (37459).    8. Referrals: None indicated     9. Records: Neurosurgical notes reviewed    10. Lifestyle goals:    Follow-up 3 months      [facility]  Stan Booth MD

## 2024-01-11 RX ORDER — METHOCARBAMOL 750 MG/1
750 TABLET, FILM COATED ORAL 4 TIMES DAILY PRN
Qty: 30 TABLET | Refills: 0 | Status: SHIPPED | OUTPATIENT
Start: 2024-01-11

## 2024-01-11 NOTE — TELEPHONE ENCOUNTER
Provider:  Dante  Surgery/Procedure:  Cervical lami C3-4  Surgery/Procedure Date:  09/20/2023  Last visit:   11/10/23  Next visit: 02/12/2024     Reason for call:  Requested Prescriptions     Pending Prescriptions Disp Refills    methocarbamol (ROBAXIN) 750 MG tablet [Pharmacy Med Name: METHOCARBAMOL 750MG TABLETS] 30 tablet 0     Sig: TAKE 1 TABLET BY MOUTH FOUR TIMES DAILY AS NEEDED FOR MUSCLE SPASMS

## 2024-01-24 ENCOUNTER — DOCUMENTATION (OUTPATIENT)
Dept: PAIN MEDICINE | Facility: CLINIC | Age: 64
End: 2024-01-24

## 2024-01-24 ENCOUNTER — OUTSIDE FACILITY SERVICE (OUTPATIENT)
Dept: PAIN MEDICINE | Facility: CLINIC | Age: 64
End: 2024-01-24
Payer: COMMERCIAL

## 2024-01-24 NOTE — PROGRESS NOTES
[facility]  [address] [place] [id number]      PROCEDURE: Fluoroscopically-guided C7/T1 Cervical Interlaminar Epidural Steroid Injection    PRE-OP DIAGNOSIS: Cervical radiculopathy  POST-OP DIAGNOSIS: Cervical radiculopathy    BLOOD THINNERS (ANTIPLATELETS/ANTICOAGULANTS): Were discussed with the patient and have been managed according to HANY Guidelines.    CONSENT: Risks, benefits and options were explained to the patient, all questions were answered and written informed consent was obtained.     ANESTHESIA: Moderate sedation was required to maintain comfort, safety, and cooperation during the procedure.  The duration of sedation service was over 10 minutes.  Patient received 1 mg IV Versed and 50 mcg IV fentanyl.  Independent observation and monitoring was performed by JORDI Montano, RN.  The patient's level of consciousness and physiologic status was continually monitored with pulse oximetry, EKG from 1051 to 1104.  There were no complications or adverse events during sedation.  After the sedation patient was taken to the recovery area.      PROCEDURE NOTE: A pre-procedural time out was performed to confirm the correct patient, procedure, side, and site. The patient was placed prone with pillow under the chest and all pressure points padded. The patient's neck and upper thoracic spine were prepped in standard fashion using Chlorhexidine and draped with sterile towels. The target cervical interspace was identified using fluoroscopy. The overlying skin and subcutaneous tissue was anesthetized with 1% lidocaine. A 20 gauge 10 cm Tuohy needle was advanced using intermittent AP and contralateral oblique fluoroscopy to the targeted level. The ligamentum flavum was engaged. Access to the epidural space was gained using a loss of resistance technique to combination air and normal saline. Needle placement was confirmed with 1 ml of Omnipaque 180 mgI/ml contrast using biplanar  fluoroscopic imaging. An epidurogram was noted without evidence of intravascular or intrathecal spread. After negative aspiration, a mixture containing 2 ml of preservative-free normal saline, Dexamethasone 10 mg steroid for a total volume of 3 ml was injected under direct visualization with fluoroscopy. The Tuohy needle was flushed, removed and a bandage applied.     EBL: None     COMPLICATIONS: None     The patient tolerated the procedure well. Vital signs were stable. Sensory and motor exam was unchanged from baseline. The patient was observed in recovery and was discharged after meeting established criteria.    FOLLOW UP: As scheduled     ADDITIONAL NOTES: []    CODES:  [id number]   [id number]

## 2024-01-30 RX ORDER — METHOCARBAMOL 750 MG/1
750 TABLET, FILM COATED ORAL 4 TIMES DAILY PRN
Qty: 30 TABLET | Refills: 0 | Status: SHIPPED | OUTPATIENT
Start: 2024-01-30

## 2024-01-30 NOTE — TELEPHONE ENCOUNTER
Provider:  Guy EM  Surgery/Procedure:  CERVICAL LAMINECTOMY DECOMPRESSION POSTERIOR C3-4   Surgery/Procedure Date: 9-  Last visit:   11-10-23  Next visit: 2-     Reason for call: Pharmacy is requesting a refill for Methocarbamol 750 MG.  Please  Advise. Thank you.        Requested Prescriptions     Pending Prescriptions Disp Refills    methocarbamol (ROBAXIN) 750 MG tablet 30 tablet 0     Sig: Take 1 tablet by mouth 4 (Four) Times a Day As Needed for Muscle Spasms.

## 2024-02-12 ENCOUNTER — OFFICE VISIT (OUTPATIENT)
Dept: NEUROSURGERY | Facility: CLINIC | Age: 64
End: 2024-02-12
Payer: COMMERCIAL

## 2024-02-12 VITALS
HEIGHT: 68 IN | TEMPERATURE: 97.5 F | DIASTOLIC BLOOD PRESSURE: 80 MMHG | WEIGHT: 153 LBS | SYSTOLIC BLOOD PRESSURE: 120 MMHG | BODY MASS INDEX: 23.19 KG/M2

## 2024-02-12 DIAGNOSIS — G95.20 CERVICAL SPINAL CORD COMPRESSION: Primary | ICD-10-CM

## 2024-02-12 PROCEDURE — 99214 OFFICE O/P EST MOD 30 MIN: CPT | Performed by: NEUROLOGICAL SURGERY

## 2024-02-14 ENCOUNTER — TELEPHONE (OUTPATIENT)
Dept: NEUROSURGERY | Facility: CLINIC | Age: 64
End: 2024-02-14
Payer: COMMERCIAL

## 2024-02-16 DIAGNOSIS — G95.20 CERVICAL SPINAL CORD COMPRESSION: Primary | ICD-10-CM

## 2024-02-16 RX ORDER — METHOCARBAMOL 750 MG/1
750 TABLET, FILM COATED ORAL 4 TIMES DAILY PRN
Qty: 30 TABLET | Refills: 0 | Status: SHIPPED | OUTPATIENT
Start: 2024-02-16

## 2024-02-28 DIAGNOSIS — G95.20 CERVICAL SPINAL CORD COMPRESSION: ICD-10-CM

## 2024-02-29 RX ORDER — METHOCARBAMOL 750 MG/1
750 TABLET, FILM COATED ORAL 4 TIMES DAILY PRN
Qty: 30 TABLET | Refills: 2 | Status: SHIPPED | OUTPATIENT
Start: 2024-02-29

## 2024-04-18 DIAGNOSIS — G95.20 CERVICAL SPINAL CORD COMPRESSION: ICD-10-CM

## 2024-04-18 RX ORDER — METHOCARBAMOL 750 MG/1
750 TABLET, FILM COATED ORAL 4 TIMES DAILY PRN
Qty: 30 TABLET | Refills: 2 | Status: SHIPPED | OUTPATIENT
Start: 2024-04-18

## 2024-04-24 ENCOUNTER — OFFICE VISIT (OUTPATIENT)
Dept: PAIN MEDICINE | Facility: CLINIC | Age: 64
End: 2024-04-24
Payer: COMMERCIAL

## 2024-04-24 VITALS — BODY MASS INDEX: 22.94 KG/M2 | WEIGHT: 151.4 LBS | HEIGHT: 68 IN

## 2024-04-24 DIAGNOSIS — G99.2 MYELOPATHY CONCURRENT WITH AND DUE TO SPINAL STENOSIS OF CERVICAL REGION: Primary | ICD-10-CM

## 2024-04-24 DIAGNOSIS — M48.02 MYELOPATHY CONCURRENT WITH AND DUE TO SPINAL STENOSIS OF CERVICAL REGION: Primary | ICD-10-CM

## 2024-04-24 DIAGNOSIS — Z51.81 ENCOUNTER FOR THERAPEUTIC DRUG LEVEL MONITORING: ICD-10-CM

## 2024-04-24 PROCEDURE — 99213 OFFICE O/P EST LOW 20 MIN: CPT

## 2024-04-24 RX ORDER — PREGABALIN 200 MG/1
1 CAPSULE ORAL EVERY 12 HOURS SCHEDULED
COMMUNITY
Start: 2024-04-16

## 2024-04-24 NOTE — PROGRESS NOTES
Referring Physician: No referring provider defined for this encounter.    Primary Physician: Cher Hernandez PA    CHIEF COMPLAINT or REASON FOR VISIT: Follow-up (Post CARLEY/Patient states injection did not help.) and Neck Pain      Initial history of present illness on 01/10/2024:  Mr. Sam Wilhelm is 63 y.o. male who presents as a new patient referral for evaluation and treatment of chronic bilateral hand pain.  He describes a many year history of increasing bilateral hand and forearm pain radiating up his arms.  He ultimately was evaluated by neurosurgery and diagnosed with myelopathy secondary to C3/4 stenosis.  He underwent cervical decompression in September 2023 with Dr. Howell.  He states that since that time he no longer has the increasing pain up proximal to his elbows but continues to complain of significant bilateral hand and forearm burning pain.  He denies weakness.  He has been managed for this issue with pregabalin and hydrocodone with some benefit.  He has previously undergone bilateral carpal tunnel surgery.  He was recently evaluated by Guy Barr PA-C with neurosurgery who referred for pain management.    Interval history: Patient returns to clinic after undergoing cervical interlaminar epidural steroid injection.  He reports minimal benefit from this procedure.  Today he continues to complain of chronic neck pain, and chronic bilateral hand pain.  He complains of bilateral numbness and tingling from the elbows down to the hands.  He reports that he has failed multiple carpal tunnel surgeries in the past.  We discussed bilateral cervical medial branch peripheral nerve stimulation with Sprint at today's appointment.  Patient would like to follow-up with Dr. Howell on 5/16/2024 before making decision about this procedure.  He is scheduled for an EMG on 5/10/2024.   Patient was prescribed pregabalin at his last office visit.  A urine drug screen on 1/10/2024 reveals positive THC.   Patient reports he has no plan on stopping use of THC.  We informed him that we we will discontinue pregabalin.   He has a new cervical MRI from 4/4/2024 that revealed diffuse spondylosis at all levels.  Neuroforaminal narrowing worst at C3-4, C4-5, and C6-7.  At C3-4 there is a 5 mm lesion in the central spinal cord with adjacent spinal cord atrophy.  This is consistent with chronic myelomalacia related to a previous injury.        Interventions:  1/24/2024: C7/T1 CARLEY with minimal benefit minimal benefit from CARLEY    Objective Pain Scoring:   BRIEF PAIN INVENTORY:  Total score:   Pain Score    04/24/24 0829   PainSc:   7   PainLoc: Neck        PHQ-2: PHQ-2 Total Score: 1  PHQ-9: PHQ-9: Brief Depression Severity Measure Score: 1  Opioid Risk Tool:         Review of Systems:   ROS negative except as otherwise noted     Past Medical History:   Past Medical History:   Diagnosis Date    Arthritis     B12 deficiency     Cervical disc disorder     Been dealing with this a long time.    Cervical myelopathy with cervical radiculopathy 08/28/2023    CHF (congestive heart failure)     Claustrophobia     COPD (chronic obstructive pulmonary disease)     Coronary artery disease involving native coronary artery of native heart 09/20/2020    CTS (carpal tunnel syndrome)     Surgery on both hands    Elevated cholesterol     Essential hypertension 09/17/2020    Hyperlipidemia     Joint pain     Low back pain     RLS    Neck pain     Renal insufficiency 09/18/2020         Past Surgical History:   Past Surgical History:   Procedure Laterality Date    CARDIAC CATHETERIZATION N/A 09/18/2020    Procedure: LEFT HEART CATH;  Surgeon: Higinio Monroy MD;  Location: [facility] CATH INVASIVE LOC[facility];  Service: Cardiovascular;  Laterality: N/A;    CARPAL TUNNEL RELEASE      CERVICAL LAMINECTOMY DECOMPRESSION POSTERIOR N/A 09/20/2023    Procedure: CERVICAL LAMINECTOMY DECOMPRESSION POSTERIOR C3-4;  Surgeon: Kurtis Howell MD;   Location: [facility] OR;  Service: Neurosurgery;  Laterality: N/A;    COLONOSCOPY      HAND SURGERY Bilateral     R x2, L x1    NECK SURGERY           Family History   Family History   Problem Relation Age of Onset    Dementia Mother     Other Mother         Alzheimer's    Alzheimer's disease Mother     Heart disease Father     Stroke Father     Heart failure Father     Diabetes Father     Coronary artery disease Father     No Known Problems Sister     No Known Problems Brother     No Known Problems Maternal Grandmother     No Known Problems Maternal Grandfather     No Known Problems Paternal Grandmother     No Known Problems Paternal Grandfather          Social History   Social History     Socioeconomic History    Marital status:    Tobacco Use    Smoking status: Every Day     Current packs/day: 0.50     Average packs/day: 0.5 packs/day for 50.3 years (25.2 ttl pk-yrs)     Types: Cigarettes     Start date: 1/1/1974    Smokeless tobacco: Former   Vaping Use    Vaping status: Never Used   Substance and Sexual Activity    Alcohol use: Yes     Alcohol/week: 20.0 standard drinks of alcohol     Types: 20 Cans of beer per week     Comment: 4-6 BEER NIGHTLY    Drug use: Yes     Types: Marijuana    Sexual activity: Yes     Partners: Female     Birth control/protection: None        Medications:     Current Outpatient Medications:     Ascorbic Acid (VITAMIN C PO), Take 1,000 mg by mouth 2 (Two) Times a Day., Disp: , Rfl:     atorvastatin (LIPITOR) 40 MG tablet, Take 1 tablet by mouth Every Night., Disp: , Rfl:     buPROPion XL (WELLBUTRIN XL) 150 MG 24 hr tablet, Take 1 tablet by mouth Every Morning., Disp: , Rfl:     cholecalciferol (VITAMIN D3) 25 MCG (1000 UT) tablet, Take 2 tablets by mouth 2 (Two) Times a Day., Disp: , Rfl:     cyanocobalamin 1000 MCG/ML injection, 1 mL., Disp: , Rfl:     Melatonin 10 MG tablet, Take 1 tablet by mouth Every Night., Disp: , Rfl:     methocarbamol (ROBAXIN) 750 MG tablet, TAKE 1 TABLET  "BY MOUTH FOUR TIMES DAILY AS NEEDED FOR MUSCLE SPASMS, Disp: 30 tablet, Rfl: 2    metoprolol succinate XL (TOPROL-XL) 25 MG 24 hr tablet, Take 0.5 tablets by mouth Every Night., Disp: 60 tablet, Rfl: 0    ondansetron ODT (ZOFRAN-ODT) 4 MG disintegrating tablet, DISSOLVE 1 TABLET ON THE TONGUE EVERY 4 HOURS FOR 3 DAYS, Disp: , Rfl:     sacubitril-valsartan (ENTRESTO) 24-26 MG tablet, Take 1 tablet by mouth Every 12 (Twelve) Hours., Disp: 60 tablet, Rfl: 0    spironolactone (ALDACTONE) 25 MG tablet, Take 1 tablet by mouth Daily., Disp: 60 tablet, Rfl: 0    HYDROcodone-acetaminophen (NORCO) 5-325 MG per tablet, Take 1 tablet by mouth Every 12 (Twelve) Hours. (Patient not taking: Reported on 4/24/2024), Disp: , Rfl:     pregabalin (LYRICA) 200 MG capsule, Take 1 capsule by mouth Every 12 (Twelve) Hours., Disp: , Rfl:         Physical Exam:     Vitals:    04/24/24 0829   Weight: 68.7 kg (151 lb 6.4 oz)   Height: 172.7 cm (67.99\")   PainSc:   7   PainLoc: Neck        General: Alert and oriented, No acute distress.   HEENT: Normocephalic, atraumatic.   Cardiovascular: No gross edema  Respiratory: Respirations are non-labored    Cervical Spine:   No masses or atrophy  Range of motion - Flexion normal. Extension normal. Lateral rotation normal.   Palpation - nontender   Spurling's - negative       Motor Exam:    Strength: Rate on 1-5 scale Right Left    C5-Elbow flexion, Deltoid 5 5    C6-Wrist extension 5 5    C7- Elbow / finger extension 5 5    C8- Finger flexion 5 5    T1- Intrinsics hand 5 5    Sensory Exam: Full and equal sensation to light touch throughout.    Neurologic: Cranial Nerves II-XII are grossly intact.   Izquierdo’s -negative bilaterally   Psychiatric: Cooperative.   Gait: Normal   Assistive Devices: None    Physical exam is consistent and accurate as of 4/24/2024.     Imaging Studies:   No results found for this or any previous visit.      Impression & Plan:       01/10/2024: Sam Wilhelm is a 63 y.o. male " with past medical history significant for HTN, alcoholism, tobacco abuse, cervical myelopathy status post C3/4 decompression with Dr. Howell in September 2023, who presents to the pain clinic for evaluation and treatment of chronic bilateral hand and forearm burning pain.  I personally reviewed and interpreted his preoperative cervical MRI dated August 18, 2023 demonstrating: C3/4 anterolisthesis with severe canal stenosis and cord edema.  Examination consistent with cervical myelopathy versus peripheral neuropathy versus nerve entrapment.  Obtain EMG/NCV to rule out EtOH peripheral neuropathy.  We discussed epidural steroid injection to improve pain.  If greater than 50% relief for at least 2-3 months can consider repeat as needed every 3 to 4 months.  I had a discussion with the patient regarding the risks of the procedure including bleeding, infection, damage to surrounding structures.  We discussed the potential adverse effects of corticosteroid injection including flushing of the face, lipodystrophy, skin discoloration, elevated blood glucose, increased blood pressure.  Risks of frequent steroid administration include weight gain, hormonal changes, mood changes, osteoporosis.  Additionally will continue pregabalin.  I do not recommend opioids for chronic nonmalignant pain given the risks of tolerance, dependence, addiction, hormonal imbalance, immunosuppression especially in the setting of EtOH addiction.  4/24/2024: Minimal benefit from CARLEY.  Can consider bilateral cervical medial branch peripheral nerve stimulation.  D/C pregabalin due to positive THC on UDS    1. Myelopathy concurrent with and due to spinal stenosis of cervical region    2. Encounter for therapeutic drug level monitoring          PLAN:  1. Medication Recommendations:   -Recommend Voltaren topical, NSAIDs, Tylenol.  Can trial turmeric 500 mg twice daily if NSAID contraindicated.    Discontinue pregabalin due to positive THC on UDS    2.  Physical Therapy: Continue HEP    3. Psychological: defer    4. Complementary and alternative (CAM) Therapies:     5. Labs/Diagnostic studies: UDS reviewed    6. Imagin. Interventions: Consider bilateral cervical medial branch peripheral nerve stimulation with Sprint ([id number] [id number]).     8. Referrals: None indicated     9. Records: Neurosurgical notes reviewed    10. Lifestyle goals:    Follow-up 1-2 months, after appointment with neurosurgery      [facility]  Aniceto Henderson PA-C

## 2024-05-10 ENCOUNTER — HOSPITAL ENCOUNTER (OUTPATIENT)
Dept: NEUROLOGY | Facility: HOSPITAL | Age: 64
Discharge: HOME | End: 2024-05-10
Payer: COMMERCIAL

## 2024-05-10 DIAGNOSIS — G95.20 CERVICAL SPINAL CORD COMPRESSION: ICD-10-CM

## 2024-05-10 PROCEDURE — 95910 NRV CNDJ TEST 7-8 STUDIES: CPT

## 2024-05-10 PROCEDURE — 95886 MUSC TEST DONE W/N TEST COMP: CPT

## 2024-05-16 ENCOUNTER — OFFICE VISIT (OUTPATIENT)
Dept: NEUROSURGERY | Facility: CLINIC | Age: 64
End: 2024-05-16
Payer: COMMERCIAL

## 2024-05-16 VITALS
WEIGHT: 148.8 LBS | SYSTOLIC BLOOD PRESSURE: 120 MMHG | BODY MASS INDEX: 22.55 KG/M2 | HEIGHT: 68 IN | DIASTOLIC BLOOD PRESSURE: 80 MMHG | TEMPERATURE: 97.5 F

## 2024-05-16 DIAGNOSIS — G95.20 CERVICAL SPINAL CORD COMPRESSION: Primary | ICD-10-CM

## 2024-05-16 PROCEDURE — 99214 OFFICE O/P EST MOD 30 MIN: CPT | Performed by: NEUROLOGICAL SURGERY

## 2024-05-16 NOTE — PROGRESS NOTES
NAME: JEAN PIERRE GANDARA   DOS: 2024  : 1960  PCP: Cher Hernandez PA    Chief Complaint:    Chief Complaint   Patient presents with    Follow-up    Neck Pain       History of Present Illness:  63 y.o. male   I saw this 63-year-old male in neurosurgical consultation.  He is 63 years of age and presented with severe cervical spondylitic myelopathy.  He underwent a C3 3 4 cervical laminectomy and is here for follow-up he still having burning in his hands up to his elbows he has a history of chronic left shoulder dysfunction he is undergone a cervical injection that failed to relieve his pain    I followed him up with an MRI and EMG nerve conduction study he denies any additional problems    He is currently filing for disability    PMHX  Allergies:  No Known Allergies  Medications    Current Outpatient Medications:     Ascorbic Acid (VITAMIN C PO), Take 1,000 mg by mouth 2 (Two) Times a Day., Disp: , Rfl:     atorvastatin (LIPITOR) 40 MG tablet, Take 1 tablet by mouth Every Night., Disp: , Rfl:     buPROPion XL (WELLBUTRIN XL) 150 MG 24 hr tablet, Take 1 tablet by mouth Every Morning., Disp: , Rfl:     cholecalciferol (VITAMIN D3) 25 MCG (1000 UT) tablet, Take 2 tablets by mouth 2 (Two) Times a Day., Disp: , Rfl:     cyanocobalamin 1000 MCG/ML injection, 1 mL., Disp: , Rfl:     Melatonin 10 MG tablet, Take 1 tablet by mouth Every Night., Disp: , Rfl:     methocarbamol (ROBAXIN) 750 MG tablet, TAKE 1 TABLET BY MOUTH FOUR TIMES DAILY AS NEEDED FOR MUSCLE SPASMS, Disp: 30 tablet, Rfl: 2    metoprolol succinate XL (TOPROL-XL) 25 MG 24 hr tablet, Take 0.5 tablets by mouth Every Night., Disp: 60 tablet, Rfl: 0    ondansetron ODT (ZOFRAN-ODT) 4 MG disintegrating tablet, DISSOLVE 1 TABLET ON THE TONGUE EVERY 4 HOURS FOR 3 DAYS, Disp: , Rfl:     pregabalin (LYRICA) 200 MG capsule, Take 1 capsule by mouth Every 12 (Twelve) Hours., Disp: , Rfl:     sacubitril-valsartan (ENTRESTO) 24-26 MG tablet, Take 1 tablet  by mouth Every 12 (Twelve) Hours., Disp: 60 tablet, Rfl: 0    spironolactone (ALDACTONE) 25 MG tablet, Take 1 tablet by mouth Daily., Disp: 60 tablet, Rfl: 0    HYDROcodone-acetaminophen (NORCO) 5-325 MG per tablet, Take 1 tablet by mouth Every 12 (Twelve) Hours. (Patient not taking: Reported on 5/16/2024), Disp: , Rfl:   Past Medical History:  Past Medical History:   Diagnosis Date    Arthritis     B12 deficiency     Cervical disc disorder     Been dealing with this a long time.    Cervical myelopathy with cervical radiculopathy 08/28/2023    CHF (congestive heart failure)     Claustrophobia     COPD (chronic obstructive pulmonary disease)     Coronary artery disease involving native coronary artery of native heart 09/20/2020    CTS (carpal tunnel syndrome)     Surgery on both hands    Elevated cholesterol     Essential hypertension 09/17/2020    Hyperlipidemia     Joint pain     Low back pain     RLS    Neck pain     Renal insufficiency 09/18/2020     Past Surgical History:  Past Surgical History:   Procedure Laterality Date    CARDIAC CATHETERIZATION N/A 09/18/2020    Procedure: LEFT HEART CATH;  Surgeon: Higinio Monroy MD;  Location:  [facility] CATH INVASIVE LOCATION;  Service: Cardiovascular;  Laterality: N/A;    CARPAL TUNNEL RELEASE      CERVICAL LAMINECTOMY DECOMPRESSION POSTERIOR N/A 09/20/2023    Procedure: CERVICAL LAMINECTOMY DECOMPRESSION POSTERIOR C3-4;  Surgeon: Kurtis Howell MD;  Location:  [facility];  Service: Neurosurgery;  Laterality: N/A;    COLONOSCOPY      HAND SURGERY Bilateral     R x2, L x1    NECK SURGERY       Social Hx:  Social History     Tobacco Use    Smoking status: Every Day     Current packs/day: 0.50     Average packs/day: 0.5 packs/day for 50.4 years (25.2 ttl pk-yrs)     Types: Cigarettes     Start date: 1/1/1974    Smokeless tobacco: Former   Vaping Use    Vaping status: Never Used   Substance Use Topics    Alcohol use: Yes     Alcohol/week: 20.0 standard drinks of  alcohol     Types: 20 Cans of beer per week     Comment: 4-6 BEER NIGHTLY    Drug use: Yes     Types: Marijuana     Family Hx:  Family History   Problem Relation Age of Onset    Dementia Mother     Other Mother         Alzheimer's    Alzheimer's disease Mother     Heart disease Father     Stroke Father     Heart failure Father     Diabetes Father     Coronary artery disease Father     No Known Problems Sister     No Known Problems Brother     No Known Problems Maternal Grandmother     No Known Problems Maternal Grandfather     No Known Problems Paternal Grandmother     No Known Problems Paternal Grandfather      Review of Systems:        Review of Systems   Constitutional:  Negative for activity change, appetite change, chills, diaphoresis, fatigue, fever and unexpected weight change.   HENT:  Negative for congestion, dental problem, drooling, ear discharge, ear pain, facial swelling, hearing loss, mouth sores, nosebleeds, postnasal drip, rhinorrhea, sinus pressure, sinus pain, sneezing, sore throat, tinnitus, trouble swallowing and voice change.    Eyes:  Negative for photophobia, pain, discharge, redness, itching and visual disturbance.   Respiratory:  Negative for apnea, cough, choking, chest tightness, shortness of breath, wheezing and stridor.    Cardiovascular:  Negative for chest pain, palpitations and leg swelling.   Gastrointestinal:  Negative for abdominal distention, abdominal pain, anal bleeding, blood in stool, constipation, diarrhea, nausea, rectal pain and vomiting.   Endocrine: Negative for cold intolerance, heat intolerance, polydipsia, polyphagia and polyuria.   Genitourinary:  Negative for decreased urine volume, difficulty urinating, dysuria, enuresis, flank pain, frequency, genital sores, hematuria and urgency.   Musculoskeletal:  Positive for neck pain and neck stiffness. Negative for arthralgias, back pain, gait problem, joint swelling and myalgias.   Skin:  Negative for color change, pallor,  rash and wound.   Allergic/Immunologic: Negative for environmental allergies, food allergies and immunocompromised state.   Neurological:  Positive for numbness. Negative for dizziness, tremors, seizures, syncope, facial asymmetry, speech difficulty, weakness, light-headedness and headaches.   Hematological:  Negative for adenopathy. Does not bruise/bleed easily.   Psychiatric/Behavioral:  Negative for agitation, behavioral problems, confusion, decreased concentration, dysphoric mood, hallucinations, self-injury, sleep disturbance and suicidal ideas. The patient is not nervous/anxious and is not hyperactive.         I have reviewed this note template and all pertinent parts of the review of systems social, family history, surgical history and medication list  Physical Examination:  Vitals:    05/16/24 0944   BP: 120/80   Temp: 97.5 °F (36.4 °C)      General Appearance:   Well developed, well nourished, well groomed, alert, and cooperative.  Neurological examination:  Neurologic Exam  He is wide-awake alert and follows commands    Cranial nerves grossly intact    He has left-sided intrinsic shoulder dysfunction that is minor    He is hyperreflexic throughout consistent with his myelopathic picture    He has no Kyler's    Gait is unstable but he is better    He has good strength through his arms with prior well-healed incisions of his carpal tunnel  His cervical incisions clean dry and intact      Review of Imaging/DATA:  I personally reviewed and interpreted MRI of his cervical spine he is well decompressed at the cervical spine with expected postoperative change    He has severe cervical arthropathy is C3-4 intraforaminal disease at that level as well as at 6/7  EMG nerve conduction study shows the presence of chronic C5-6 radiculopathy with EMG nerve studies showing the presence of sensorimotor neuropathy that is generalized  Diagnoses/Plan:    Mr. Wilhelm is a 63 y.o. male   1.  Cervical spondylitic myelopathy  cord contusion status post successful cervical laminectomy with improvement of gait and station    2.  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  I explained there is no straight path forward    That being said he may have the presence of 6/7 radiculopathy given his intraforaminal disease    I think it is reasonable to do a cervical myelogram to see if his 6/7 areas are really tight if they are we could consider an ACDF I think at the low risk procedure that may provide him some relief of his proximal neck and shoulder pain as well as some of his dysesthesia that being said I made no guarantees    Secondary option would be a spinal cord stimulator either prone to migration    I would like him to get a C5-6 transforaminal block to see if that assists if he fails that he can get a cervical myelogram I will see him back with the results    I told him to prop follow-up with his primary care regarding his disability given his multifactorial arthritic issues shoulder issues neck issues etc. would fully support him and disability most cervical myelopathic patients with cord contusions and ongoing chronic pain have difficult time with lifting bending and twisting and repetitive use standing for protracted times and being able to commit to full-time work given the variability of their quality of life and function    We be happy to assist in filling out paperwork if needed

## 2024-05-17 ENCOUNTER — OFFICE VISIT (OUTPATIENT)
Dept: PAIN MEDICINE | Facility: CLINIC | Age: 64
End: 2024-05-17
Payer: COMMERCIAL

## 2024-05-17 VITALS — WEIGHT: 148 LBS | HEIGHT: 68 IN | BODY MASS INDEX: 22.43 KG/M2

## 2024-05-17 DIAGNOSIS — M48.02 MYELOPATHY CONCURRENT WITH AND DUE TO SPINAL STENOSIS OF CERVICAL REGION: Primary | ICD-10-CM

## 2024-05-17 DIAGNOSIS — G99.2 MYELOPATHY CONCURRENT WITH AND DUE TO SPINAL STENOSIS OF CERVICAL REGION: Primary | ICD-10-CM

## 2024-05-17 NOTE — PROGRESS NOTES
Referring Physician: No referring provider defined for this encounter.    Primary Physician: Cher Hernandez PA    CHIEF COMPLAINT or REASON FOR VISIT: Follow-up (Discuss injection. ) and Neck Pain      Initial history of present illness on 01/10/2024:  Mr. Sam Wilhelm is 63 y.o. male who presents as a new patient referral for evaluation and treatment of chronic bilateral hand pain.  He describes a many year history of increasing bilateral hand and forearm pain radiating up his arms.  He ultimately was evaluated by neurosurgery and diagnosed with myelopathy secondary to C3/4 stenosis.  He underwent cervical decompression in September 2023 with Dr. Howell.  He states that since that time he no longer has the increasing pain up proximal to his elbows but continues to complain of significant bilateral hand and forearm burning pain.  He denies weakness.  He has been managed for this issue with pregabalin and hydrocodone with some benefit.  He has previously undergone bilateral carpal tunnel surgery.  He was recently evaluated by Guy Barr PA-C with neurosurgery who referred for pain management.    Interval history: Patient returns to clinic today after evaluation with neurosurgery, Dr. Howell.  At that appointment Dr. Howell recommended a cervical transforaminal epidural steroid injection alleviate his pain.  Today, he still complains of chronic neck pain, as well has bilateral upper extremity numbness and tingling that starts approximately at the elbow and terminates within his fingers.  He denies any new injuries or events since his previous appointment.  He reports anytime he uses his right arm he will feel a sharp, stabbing pain within the left side of his neck.  Occasionally, his neck pain will radiate into the occiput.      Interventions:  1/24/2024: C7/T1 CARLEY with minimal benefit minimal benefit from CARLEY    Objective Pain Scoring:   BRIEF PAIN INVENTORY:  Total score:   Pain Score    05/17/24  0959   PainSc:   8   PainLoc: Neck          PHQ-2: PHQ-2 Total Score: 0  PHQ-9: PHQ-9: Brief Depression Severity Measure Score: 0  Opioid Risk Tool:         Review of Systems:   ROS negative except as otherwise noted     Past Medical History:   Past Medical History:   Diagnosis Date    Arthritis     B12 deficiency     Cervical disc disorder     Been dealing with this a long time.    Cervical myelopathy with cervical radiculopathy 08/28/2023    CHF (congestive heart failure)     Claustrophobia     COPD (chronic obstructive pulmonary disease)     Coronary artery disease involving native coronary artery of native heart 09/20/2020    CTS (carpal tunnel syndrome)     Surgery on both hands    Elevated cholesterol     Essential hypertension 09/17/2020    Hyperlipidemia     Joint pain     Low back pain     RLS    Neck pain     Renal insufficiency 09/18/2020         Past Surgical History:   Past Surgical History:   Procedure Laterality Date    CARDIAC CATHETERIZATION N/A 09/18/2020    Procedure: LEFT HEART CATH;  Surgeon: Higinio Monroy MD;  Location:  [facility] CATH INVASIVE LOC[facility];  Service: Cardiovascular;  Laterality: N/A;    CARPAL TUNNEL RELEASE      CERVICAL LAMINECTOMY DECOMPRESSION POSTERIOR N/A 09/20/2023    Procedure: CERVICAL LAMINECTOMY DECOMPRESSION POSTERIOR C3-4;  Surgeon: Kurtis Howell MD;  Location: [facility];  Service: Neurosurgery;  Laterality: N/A;    COLONOSCOPY      HAND SURGERY Bilateral     R x2, L x1    NECK SURGERY           Family History   Family History   Problem Relation Age of Onset    Dementia Mother     Other Mother         Alzheimer's    Alzheimer's disease Mother     Heart disease Father     Stroke Father     Heart failure Father     Diabetes Father     Coronary artery disease Father     No Known Problems Sister     No Known Problems Brother     No Known Problems Maternal Grandmother     No Known Problems Maternal Grandfather     No Known Problems Paternal Grandmother     No  Known Problems Paternal Grandfather          Social History   Social History     Socioeconomic History    Marital status:    Tobacco Use    Smoking status: Every Day     Current packs/day: 0.50     Average packs/day: 0.5 packs/day for 50.4 years (25.2 ttl pk-yrs)     Types: Cigarettes     Start date: 1/1/1974    Smokeless tobacco: Former   Vaping Use    Vaping status: Never Used   Substance and Sexual Activity    Alcohol use: Yes     Alcohol/week: 20.0 standard drinks of alcohol     Types: 20 Cans of beer per week     Comment: 4-6 BEER NIGHTLY    Drug use: Yes     Types: Marijuana    Sexual activity: Yes     Partners: Female     Birth control/protection: None        Medications:     Current Outpatient Medications:     Ascorbic Acid (VITAMIN C PO), Take 1,000 mg by mouth 2 (Two) Times a Day., Disp: , Rfl:     atorvastatin (LIPITOR) 40 MG tablet, Take 1 tablet by mouth Every Night., Disp: , Rfl:     buPROPion XL (WELLBUTRIN XL) 150 MG 24 hr tablet, Take 1 tablet by mouth Every Morning., Disp: , Rfl:     cholecalciferol (VITAMIN D3) 25 MCG (1000 UT) tablet, Take 2 tablets by mouth 2 (Two) Times a Day., Disp: , Rfl:     cyanocobalamin 1000 MCG/ML injection, 1 mL., Disp: , Rfl:     Melatonin 10 MG tablet, Take 1 tablet by mouth Every Night., Disp: , Rfl:     methocarbamol (ROBAXIN) 750 MG tablet, TAKE 1 TABLET BY MOUTH FOUR TIMES DAILY AS NEEDED FOR MUSCLE SPASMS, Disp: 30 tablet, Rfl: 2    metoprolol succinate XL (TOPROL-XL) 25 MG 24 hr tablet, Take 0.5 tablets by mouth Every Night., Disp: 60 tablet, Rfl: 0    ondansetron ODT (ZOFRAN-ODT) 4 MG disintegrating tablet, DISSOLVE 1 TABLET ON THE TONGUE EVERY 4 HOURS FOR 3 DAYS, Disp: , Rfl:     pregabalin (LYRICA) 200 MG capsule, Take 1 capsule by mouth Every 12 (Twelve) Hours., Disp: , Rfl:     sacubitril-valsartan (ENTRESTO) 24-26 MG tablet, Take 1 tablet by mouth Every 12 (Twelve) Hours., Disp: 60 tablet, Rfl: 0    spironolactone (ALDACTONE) 25 MG tablet, Take 1  "tablet by mouth Daily., Disp: 60 tablet, Rfl: 0    HYDROcodone-acetaminophen (NORCO) 5-325 MG per tablet, Take 1 tablet by mouth Every 12 (Twelve) Hours. (Patient not taking: Reported on 5/16/2024), Disp: , Rfl:         Physical Exam:     Vitals:    05/17/24 0959   Weight: 67.1 kg (148 lb)   Height: 172.7 cm (67.99\")   PainSc:   8   PainLoc: Neck          General: Alert and oriented, No acute distress.   HEENT: Normocephalic, atraumatic.   Cardiovascular: No gross edema  Respiratory: Respirations are non-labored    Cervical Spine:   No masses or atrophy  Range of motion - Flexion normal. Extension normal. Lateral rotation normal.   Palpation - nontender   Spurling's - negative       Motor Exam:    Strength: Rate on 1-5 scale Right Left    C5-Elbow flexion, Deltoid 5 5    C6-Wrist extension 5 5    C7- Elbow / finger extension 5 5    C8- Finger flexion 5 5    T1- Intrinsics hand 5 5    Sensory Exam: Full and equal sensation to light touch throughout.    Neurologic: Cranial Nerves II-XII are grossly intact.   Izquierdo’s -negative bilaterally   Psychiatric: Cooperative.   Gait: Normal   Assistive Devices: None    Physical exam is consistent and accurate as of 5/17/2024    Imaging Studies:   No results found for this or any previous visit.      Impression & Plan:       01/10/2024: Sam Wilhelm is a 63 y.o. male with past medical history significant for HTN, alcoholism, tobacco abuse, cervical myelopathy status post C3/4 decompression with Dr. Howell in September 2023, who presents to the pain clinic for evaluation and treatment of chronic bilateral hand and forearm burning pain.  I personally reviewed and interpreted his preoperative cervical MRI dated August 18, 2023 demonstrating: C3/4 anterolisthesis with severe canal stenosis and cord edema.  Examination consistent with cervical myelopathy versus peripheral neuropathy versus nerve entrapment.  Obtain EMG/NCV to rule out EtOH peripheral neuropathy.  We discussed " epidural steroid injection to improve pain.  If greater than 50% relief for at least 2-3 months can consider repeat as needed every 3 to 4 months.  I had a discussion with the patient regarding the risks of the procedure including bleeding, infection, damage to surrounding structures.  We discussed the potential adverse effects of corticosteroid injection including flushing of the face, lipodystrophy, skin discoloration, elevated blood glucose, increased blood pressure.  Risks of frequent steroid administration include weight gain, hormonal changes, mood changes, osteoporosis.  Additionally will continue pregabalin.  I do not recommend opioids for chronic nonmalignant pain given the risks of tolerance, dependence, addiction, hormonal imbalance, immunosuppression especially in the setting of EtOH addiction.  2024: Minimal benefit from CARLEY.  Can consider bilateral cervical medial branch peripheral nerve stimulation.  D/C pregabalin due to positive THC on UDS  2024: No benefit from previous CARLEY.  Will plan for bilateral cervical transforaminal epidural steroid injection.      1. Myelopathy concurrent with and due to spinal stenosis of cervical region            PLAN:  1. Medication Recommendations:   -Recommend Voltaren topical, NSAIDs, Tylenol.  Can trial turmeric 500 mg twice daily if NSAID contraindicated.    Discontinue pregabalin due to positive THC on UDS    2. Physical Therapy: Continue HEP    3. Psychological: defer    4. Complementary and alternative (CAM) Therapies:     5. Labs/Diagnostic studies: UDS reviewed    6. Imagin. Interventions: Schedule bilateral C6/7  transforaminal epidural steroid injection (82242).  -If minimal or transient benefit can consider peripheral nerve stimulation    8. Referrals:     9. Records: Neurosurgical notes reviewed    10. Lifestyle goals:    Follow-up 4 weeks after injection      [facility]  Aniceto Henderson PA-C

## 2024-05-28 ENCOUNTER — OUTSIDE FACILITY SERVICE (OUTPATIENT)
Dept: PAIN MEDICINE | Facility: CLINIC | Age: 64
End: 2024-05-28
Payer: COMMERCIAL

## 2024-05-28 ENCOUNTER — DOCUMENTATION (OUTPATIENT)
Dept: PAIN MEDICINE | Facility: CLINIC | Age: 64
End: 2024-05-28

## 2024-05-28 NOTE — PROGRESS NOTES
[facility]  [address] [place] [address]    PROCEDURE: Fluoroscopically-Guided Cervical Transforaminal Epidural Steroid Injection, bilateral C6/7      PRE-OP DIAGNOSIS: Cervical radiculopathy  POST-OP DIAGNOSIS: same    ANTIPLATELET/ANTICOAGULANT: Discussed and managed according to HANY guidelines    CONSENT: Risks, benefits and options were explained to the patient, all questions were answered and written informed consent was obtained.     ANESTHESIA: Moderate sedation was required to maintain comfort, safety, and cooperation during the procedure.  The duration of sedation service was over 10 minutes.  Patient received 4 mg IV Versed and 50 mcg IV fentanyl.  Independent observation and monitoring was performed by Brenna Manuel.  The patient's level of consciousness and physiologic status was continually monitored with pulse oximetry, EKG from 1007 to 1029.  There were no complications or adverse events during sedation.  After the sedation patient was taken to the recovery area.    PROCEDURE NOTE: The patient was placed supine with a pillow under the neck for support and all pressure points padded. The patient's head was slightly rotated to the contralateral side. Standard ASA monitors were applied. A timeout protocol was performed. Proper protective gear was worn by the physician including a mask, scrub cap, and sterile gloves. The patient's neck was prepped with Chlorhexidine and draped in a sterile fashion. The fluoroscopic image of the target intervertebral foramen was maximized with approximately a 45 degree oblique view to the symptomatic side. The superior end plate closest to the intervertebral foramen was squared. A skin wheal was raised using 1% lidocaine. Utilizing fluoroscopic guidance, the relevant anatomy was visualized and a 25-gauge 3.5 inch spinal needle was advanced intermittently under fluoroscopic guidance using ipsilateral oblique and PA views. PA  fluoroscopic view was used as a safety view to monitor needle depth. Needle trajectory was advanced towards the dorsal region of the intervertebral foramen. The superior articular process was contacted and the needle was carefully walked off into the epidural space. After negative aspiration for blood and CSF, 0.5 ml of Omnipaque contrast was slowly injected. Fluoroscopic imaging revealed a clear outline of the target spinal nerve proximally to the epidural space. Subsequently, a mixture containing dexamethasone 10 mg for a total volume of 1 ml was slowly injected without any paresthesia incurred. The needle was withdrawn and bandages applied at the needle site.the same procedure was then repeated on the contralateral side using the same injectate.  The patient was transferred to the stretcher and taken to recovery in stable condition.      COMPLICATIONS: None       The patient tolerated the procedure well. Vital signs were stable. Sensory and motor exam was unchanged from baseline. The patient was observed in recovery and was discharged after meeting established criteria.    FOLLOW UP:     ADDITIONAL NOTES:     [facility]  Stan Booth MD     Codes:  [id number]  [id number]

## 2024-06-04 DIAGNOSIS — G95.20 CERVICAL SPINAL CORD COMPRESSION: ICD-10-CM

## 2024-06-05 RX ORDER — METHOCARBAMOL 750 MG/1
750 TABLET, FILM COATED ORAL 4 TIMES DAILY PRN
Qty: 30 TABLET | Refills: 2 | Status: SHIPPED | OUTPATIENT
Start: 2024-06-05

## 2024-06-14 ENCOUNTER — OFFICE VISIT (OUTPATIENT)
Dept: PAIN MEDICINE | Facility: CLINIC | Age: 64
End: 2024-06-14
Payer: COMMERCIAL

## 2024-06-14 VITALS — HEIGHT: 68 IN | WEIGHT: 148 LBS | BODY MASS INDEX: 22.43 KG/M2

## 2024-06-14 DIAGNOSIS — M48.02 MYELOPATHY CONCURRENT WITH AND DUE TO SPINAL STENOSIS OF CERVICAL REGION: Primary | ICD-10-CM

## 2024-06-14 DIAGNOSIS — G99.2 MYELOPATHY CONCURRENT WITH AND DUE TO SPINAL STENOSIS OF CERVICAL REGION: Primary | ICD-10-CM

## 2024-06-14 NOTE — PROGRESS NOTES
Referring Physician: No referring provider defined for this encounter.    Primary Physician: Cher Hernandez PA    CHIEF COMPLAINT or REASON FOR VISIT: Follow-up and Neck Pain      Initial history of present illness on 01/10/2024:  Mr. Sam Wilhelm is 64 y.o. male who presents as a new patient referral for evaluation and treatment of chronic bilateral hand pain.  He describes a many year history of increasing bilateral hand and forearm pain radiating up his arms.  He ultimately was evaluated by neurosurgery and diagnosed with myelopathy secondary to C3/4 stenosis.  He underwent cervical decompression in September 2023 with Dr. Howell.  He states that since that time he no longer has the increasing pain up proximal to his elbows but continues to complain of significant bilateral hand and forearm burning pain.  He denies weakness.  He has been managed for this issue with pregabalin and hydrocodone with some benefit.  He has previously undergone bilateral carpal tunnel surgery.  He was recently evaluated by Guy Barr PA-C with neurosurgery who referred for pain management.    Interval history: Patient returns to clinic today after undergoing bilateral cervical transforaminal epidural steroid injections.  He reports good benefit ongoing from these injections.  He still complains of some neck stiffness and soreness however this is significantly improved compared to before the injections.  Visually, patient continues to have numbness and tingling from his bilateral elbows down to his hand but this is improved.  We discussed peripheral nerve stimulation of the cervical spine to help alleviate some of his neck pain but patient is happy with relief that he is receiving right now.  He has no new complaints at this time.  I explained to patient that I would not expect him to be completely pain-free after any procedures that we would perform    Patient reports that he is not particularly interested in any  additional surgeries on his neck at this time.      Interventions:  1/24/2024: C7/T1 CARLEY with minimal benefit minimal benefit from CARLEY  5/28/2024: Bilateral C6/7 TFESI with 50% relief ongoing    Objective Pain Scoring:   BRIEF PAIN INVENTORY:  Total score:   Pain Score    06/14/24 1103   PainSc:   7   PainLoc: Neck          PHQ-2: PHQ-2 Total Score: 0  PHQ-9: PHQ-9: Brief Depression Severity Measure Score: 0  Opioid Risk Tool:         Review of Systems:   ROS negative except as otherwise noted     Past Medical History:   Past Medical History:   Diagnosis Date    Arthritis     B12 deficiency     Cervical disc disorder     Been dealing with this a long time.    Cervical myelopathy with cervical radiculopathy 08/28/2023    CHF (congestive heart failure)     Claustrophobia     COPD (chronic obstructive pulmonary disease)     Coronary artery disease involving native coronary artery of native heart 09/20/2020    CTS (carpal tunnel syndrome)     Surgery on both hands    Elevated cholesterol     Essential hypertension 09/17/2020    Hyperlipidemia     Joint pain     Low back pain     RLS    Neck pain     Renal insufficiency 09/18/2020         Past Surgical History:   Past Surgical History:   Procedure Laterality Date    CARDIAC CATHETERIZATION N/A 09/18/2020    Procedure: LEFT HEART CATH;  Surgeon: Higinio Monroy MD;  Location:  [facility];  Service: Cardiovascular;  Laterality: N/A;    CARPAL TUNNEL RELEASE      CERVICAL LAMINECTOMY DECOMPRESSION POSTERIOR N/A 09/20/2023    Procedure: CERVICAL LAMINECTOMY DECOMPRESSION POSTERIOR C3-4;  Surgeon: Kurtis Howell MD;  Location:  [facility];  Service: Neurosurgery;  Laterality: N/A;    COLONOSCOPY      HAND SURGERY Bilateral     R x2, L x1    NECK SURGERY           Family History   Family History   Problem Relation Age of Onset    Dementia Mother     Other Mother         Alzheimer's    Alzheimer's disease Mother     Heart disease Father     Stroke  Father     Heart failure Father     Diabetes Father     Coronary artery disease Father     No Known Problems Sister     No Known Problems Brother     No Known Problems Maternal Grandmother     No Known Problems Maternal Grandfather     No Known Problems Paternal Grandmother     No Known Problems Paternal Grandfather          Social History   Social History     Socioeconomic History    Marital status:    Tobacco Use    Smoking status: Every Day     Current packs/day: 0.50     Average packs/day: 0.5 packs/day for 50.5 years (25.2 ttl pk-yrs)     Types: Cigarettes     Start date: 1/1/1974    Smokeless tobacco: Former   Vaping Use    Vaping status: Never Used   Substance and Sexual Activity    Alcohol use: Yes     Alcohol/week: 20.0 standard drinks of alcohol     Types: 20 Cans of beer per week     Comment: 4-6 BEER NIGHTLY    Drug use: Yes     Types: Marijuana    Sexual activity: Yes     Partners: Female     Birth control/protection: None        Medications:     Current Outpatient Medications:     Ascorbic Acid (VITAMIN C PO), Take 1,000 mg by mouth 2 (Two) Times a Day., Disp: , Rfl:     atorvastatin (LIPITOR) 40 MG tablet, Take 1 tablet by mouth Every Night., Disp: , Rfl:     buPROPion XL (WELLBUTRIN XL) 150 MG 24 hr tablet, Take 1 tablet by mouth Every Morning., Disp: , Rfl:     cholecalciferol (VITAMIN D3) 25 MCG (1000 UT) tablet, Take 2 tablets by mouth 2 (Two) Times a Day., Disp: , Rfl:     cyanocobalamin 1000 MCG/ML injection, 1 mL., Disp: , Rfl:     Melatonin 10 MG tablet, Take 1 tablet by mouth Every Night., Disp: , Rfl:     methocarbamol (ROBAXIN) 750 MG tablet, TAKE 1 TABLET BY MOUTH FOUR TIMES DAILY AS NEEDED FOR MUSCLE SPASMS, Disp: 30 tablet, Rfl: 2    metoprolol succinate XL (TOPROL-XL) 25 MG 24 hr tablet, Take 0.5 tablets by mouth Every Night., Disp: 60 tablet, Rfl: 0    ondansetron ODT (ZOFRAN-ODT) 4 MG disintegrating tablet, DISSOLVE 1 TABLET ON THE TONGUE EVERY 4 HOURS FOR 3 DAYS, Disp: , Rfl:  "    pregabalin (LYRICA) 150 MG capsule, Take 1 capsule by mouth Every 12 (Twelve) Hours., Disp: , Rfl:     sacubitril-valsartan (ENTRESTO) 24-26 MG tablet, Take 1 tablet by mouth Every 12 (Twelve) Hours., Disp: 60 tablet, Rfl: 0    spironolactone (ALDACTONE) 25 MG tablet, Take 1 tablet by mouth Daily., Disp: 60 tablet, Rfl: 0    HYDROcodone-acetaminophen (NORCO) 5-325 MG per tablet, Take 1 tablet by mouth Every 12 (Twelve) Hours. (Patient not taking: Reported on 6/14/2024), Disp: , Rfl:         Physical Exam:     Vitals:    06/14/24 1103   Weight: 67.1 kg (148 lb)   Height: 172.7 cm (67.99\")   PainSc:   7   PainLoc: Neck          General: Alert and oriented, No acute distress.   HEENT: Normocephalic, atraumatic.   Cardiovascular: No gross edema  Respiratory: Respirations are non-labored    Cervical Spine:   No masses or atrophy  Range of motion - Flexion normal. Extension normal. Lateral rotation normal.   Palpation - nontender   Spurling's - negative       Motor Exam:    Strength: Rate on 1-5 scale Right Left    C5-Elbow flexion, Deltoid 5 5    C6-Wrist extension 5 5    C7- Elbow / finger extension 5 5    C8- Finger flexion 5 5    T1- Intrinsics hand 5 5    Sensory Exam: Full and equal sensation to light touch throughout.    Neurologic: Cranial Nerves II-XII are grossly intact.   Izquierdo’s -negative bilaterally   Psychiatric: Cooperative.   Gait: Normal   Assistive Devices: None    Physical exam is consistent and accurate as of 6/14/2024    Imaging Studies:   No results found for this or any previous visit.      Impression & Plan:       01/10/2024: Sam Wilhelm is a 64 y.o. male with past medical history significant for HTN, alcoholism, tobacco abuse, cervical myelopathy status post C3/4 decompression with Dr. Howell in September 2023, who presents to the pain clinic for evaluation and treatment of chronic bilateral hand and forearm burning pain.  I personally reviewed and interpreted his preoperative cervical " MRI dated 2023 demonstrating: C3/4 anterolisthesis with severe canal stenosis and cord edema.  Examination consistent with cervical myelopathy versus peripheral neuropathy versus nerve entrapment.  Obtain EMG/NCV to rule out EtOH peripheral neuropathy.  We discussed epidural steroid injection to improve pain.  If greater than 50% relief for at least 2-3 months can consider repeat as needed every 3 to 4 months.  I had a discussion with the patient regarding the risks of the procedure including bleeding, infection, damage to surrounding structures.  We discussed the potential adverse effects of corticosteroid injection including flushing of the face, lipodystrophy, skin discoloration, elevated blood glucose, increased blood pressure.  Risks of frequent steroid administration include weight gain, hormonal changes, mood changes, osteoporosis.  Additionally will continue pregabalin.  I do not recommend opioids for chronic nonmalignant pain given the risks of tolerance, dependence, addiction, hormonal imbalance, immunosuppression especially in the setting of EtOH addiction.  2024: Minimal benefit from CARLEY.  Can consider bilateral cervical medial branch peripheral nerve stimulation.  D/C pregabalin due to positive THC on UDS  2024: No benefit from previous CARLEY.  Will plan for bilateral cervical transforaminal epidural steroid injection.    2024: Good benefit from transforaminal approach.  Can consider repeat if needed.  Can consider Sprint PNS for neck pain.    1. Myelopathy concurrent with and due to spinal stenosis of cervical region              PLAN:  1. Medication Recommendations:   -Recommend Voltaren topical, NSAIDs, Tylenol.  Can trial turmeric 500 mg twice daily if NSAID contraindicated.      2. Physical Therapy: Continue HEP    3. Psychological: defer    4. Complementary and alternative (CAM) Therapies:     5. Labs/Diagnostic studies: UDS reviewed    6. Imagin. Interventions:  Can consider repeat bilateral C6/7  transforaminal epidural steroid injection (31209).  -can consider peripheral nerve stimulation    8. Referrals:     9. Records: Neurosurgical notes reviewed    10. Lifestyle goals:    Follow-up 4 months      [facility]  Aniceto Henderson PA-C

## 2024-07-25 DIAGNOSIS — G95.20 CERVICAL SPINAL CORD COMPRESSION: ICD-10-CM

## 2024-07-26 RX ORDER — METHOCARBAMOL 750 MG/1
750 TABLET, FILM COATED ORAL 4 TIMES DAILY PRN
Qty: 30 TABLET | Refills: 2 | Status: SHIPPED | OUTPATIENT
Start: 2024-07-26

## 2024-10-08 DIAGNOSIS — G95.20 CERVICAL SPINAL CORD COMPRESSION: ICD-10-CM

## 2024-10-09 RX ORDER — METHOCARBAMOL 750 MG/1
750 TABLET, FILM COATED ORAL 4 TIMES DAILY PRN
Qty: 30 TABLET | Refills: 2 | Status: SHIPPED | OUTPATIENT
Start: 2024-10-09

## 2024-10-11 ENCOUNTER — OFFICE VISIT (OUTPATIENT)
Dept: PAIN MEDICINE | Facility: CLINIC | Age: 64
End: 2024-10-11
Payer: COMMERCIAL

## 2024-10-11 VITALS — HEIGHT: 68 IN | WEIGHT: 148 LBS | BODY MASS INDEX: 22.43 KG/M2

## 2024-10-11 DIAGNOSIS — G99.2 MYELOPATHY CONCURRENT WITH AND DUE TO SPINAL STENOSIS OF CERVICAL REGION: Primary | ICD-10-CM

## 2024-10-11 DIAGNOSIS — M48.02 MYELOPATHY CONCURRENT WITH AND DUE TO SPINAL STENOSIS OF CERVICAL REGION: Primary | ICD-10-CM

## 2024-10-11 NOTE — PROGRESS NOTES
Referring Physician: No referring provider defined for this encounter.    Primary Physician: Cher Hernandez PA    CHIEF COMPLAINT or REASON FOR VISIT: Follow-up, Neck Pain, and Tingling (Hands, Bilateral. )      Initial history of present illness on 01/10/2024:  Mr. Sam Wilhelm is 64 y.o. male who presents as a new patient referral for evaluation and treatment of chronic bilateral hand pain.  He describes a many year history of increasing bilateral hand and forearm pain radiating up his arms.  He ultimately was evaluated by neurosurgery and diagnosed with myelopathy secondary to C3/4 stenosis.  He underwent cervical decompression in September 2023 with Dr. Howell.  He states that since that time he no longer has the increasing pain up proximal to his elbows but continues to complain of significant bilateral hand and forearm burning pain.  He denies weakness.  He has been managed for this issue with pregabalin and hydrocodone with some benefit.  He has previously undergone bilateral carpal tunnel surgery.  He was recently evaluated by Guy Barr PA-C with neurosurgery who referred for pain management.    Interval history:   Patient returns to clinic today.  He has previously undergone cervical transforaminal epidural steroid injections with good relief for approximately 4 months.  He notes a return of his symptoms within the last couple weeks without any inciting injury or event.  Today he continues to complain of chronic neck pain as well as chronic numbness tingling pain starting in his elbows extending down into his hands bilaterally.  During this timeframe after his last injection he did feel as if his quality life was improved.  He is able to perform certain activities he is unable to do before.  For example, he was able to use a chainsaw to cut trees and wood.  He would be interested and repeat injection if necessary      Interventions:  1/24/2024: C7/T1 CARLEY with minimal benefit minimal  benefit  5/28/2024: Bilateral C6/7 TFESI with 50% relief for 4 months.    Objective Pain Scoring:   BRIEF PAIN INVENTORY:  Total score:   Pain Score    10/11/24 1116   PainSc:   7   PainLoc: Hand            PHQ-2: PHQ-2 Total Score: 0  PHQ-9: PHQ-9: Brief Depression Severity Measure Score: 0  Opioid Risk Tool:         Review of Systems:   ROS negative except as otherwise noted     Past Medical History:   Past Medical History:   Diagnosis Date    Arthritis     B12 deficiency     Cervical disc disorder     Been dealing with this a long time.    Cervical myelopathy with cervical radiculopathy 08/28/2023    CHF (congestive heart failure)     Claustrophobia     COPD (chronic obstructive pulmonary disease)     Coronary artery disease involving native coronary artery of native heart 09/20/2020    CTS (carpal tunnel syndrome)     Surgery on both hands    Elevated cholesterol     Essential hypertension 09/17/2020    Hyperlipidemia     Joint pain     Low back pain     RLS    Neck pain     Renal insufficiency 09/18/2020         Past Surgical History:   Past Surgical History:   Procedure Laterality Date    CARDIAC CATHETERIZATION N/A 09/18/2020    Procedure: LEFT HEART CATH;  Surgeon: Higinio Monroy MD;  Location:  [facility];  Service: Cardiovascular;  Laterality: N/A;    CARPAL TUNNEL RELEASE      CERVICAL LAMINECTOMY DECOMPRESSION POSTERIOR N/A 09/20/2023    Procedure: CERVICAL LAMINECTOMY DECOMPRESSION POSTERIOR C3-4;  Surgeon: Kurtis Howell MD;  Location: [facility] OR;  Service: Neurosurgery;  Laterality: N/A;    COLONOSCOPY      HAND SURGERY Bilateral     R x2, L x1    NECK SURGERY           Family History   Family History   Problem Relation Age of Onset    Dementia Mother     Other Mother         Alzheimer's    Alzheimer's disease Mother     Heart disease Father     Stroke Father     Heart failure Father     Diabetes Father     Coronary artery disease Father     No Known Problems Sister      "No Known Problems Brother     No Known Problems Maternal Grandmother     No Known Problems Maternal Grandfather     No Known Problems Paternal Grandmother     No Known Problems Paternal Grandfather          Social History   Social History     Socioeconomic History    Marital status:    Tobacco Use    Smoking status: Every Day     Current packs/day: 0.50     Average packs/day: 0.5 packs/day for 50.8 years (25.4 ttl pk-yrs)     Types: Cigarettes     Start date: 1/1/1974    Smokeless tobacco: Former   Vaping Use    Vaping status: Never Used   Substance and Sexual Activity    Alcohol use: Yes     Alcohol/week: 20.0 standard drinks of alcohol     Types: 20 Cans of beer per week     Comment: 4-6 BEER NIGHTLY    Drug use: Yes     Types: Marijuana    Sexual activity: Yes     Partners: Female     Birth control/protection: None        Medications:     Current Outpatient Medications:     Ascorbic Acid (VITAMIN C PO), Take 1,000 mg by mouth 2 (Two) Times a Day., Disp: , Rfl:     atorvastatin (LIPITOR) 40 MG tablet, Take 1 tablet by mouth Every Night., Disp: , Rfl:     buPROPion XL (WELLBUTRIN XL) 150 MG 24 hr tablet, Take 1 tablet by mouth Every Morning., Disp: , Rfl:     cyanocobalamin 1000 MCG/ML injection, 1 mL., Disp: , Rfl:     Melatonin 10 MG tablet, Take 1 tablet by mouth Every Night., Disp: , Rfl:     methocarbamol (ROBAXIN) 750 MG tablet, TAKE 1 TABLET BY MOUTH FOUR TIMES DAILY AS NEEDED FOR MUSCLE SPASMS, Disp: 30 tablet, Rfl: 2    metoprolol succinate XL (TOPROL-XL) 25 MG 24 hr tablet, Take 0.5 tablets by mouth Every Night., Disp: 60 tablet, Rfl: 0    sacubitril-valsartan (ENTRESTO) 24-26 MG tablet, Take 1 tablet by mouth Every 12 (Twelve) Hours., Disp: 60 tablet, Rfl: 0    spironolactone (ALDACTONE) 25 MG tablet, Take 1 tablet by mouth Daily., Disp: 60 tablet, Rfl: 0        Physical Exam:     Vitals:    10/11/24 1116   Weight: 67.1 kg (148 lb)   Height: 172.7 cm (67.99\")   PainSc:   7   PainLoc: Hand        "     General: Alert and oriented, No acute distress.   HEENT: Normocephalic, atraumatic.   Cardiovascular: No gross edema  Respiratory: Respirations are non-labored    Cervical Spine:   No masses or atrophy  Range of motion - Flexion normal. Extension normal. Lateral rotation normal.   Palpation - nontender   Spurling's - negative       Motor Exam:    Strength: Rate on 1-5 scale Right Left    C5-Elbow flexion, Deltoid 5 5    C6-Wrist extension 5 5    C7- Elbow / finger extension 5 5    C8- Finger flexion 5 5    T1- Intrinsics hand 5 5    Sensory Exam: Full and equal sensation to light touch throughout.    Neurologic: Cranial Nerves II-XII are grossly intact.   Izquierdo’s -negative bilaterally   Psychiatric: Cooperative.   Gait: Normal   Assistive Devices: None      Imaging Studies:   No results found for this or any previous visit.      Impression & Plan:       01/10/2024: Sam Wilhelm is a 64 y.o. male with past medical history significant for HTN, alcoholism, tobacco abuse, cervical myelopathy status post C3/4 decompression with Dr. Howell in September 2023, who presents to the pain clinic for evaluation and treatment of chronic bilateral hand and forearm burning pain.  I personally reviewed and interpreted his preoperative cervical MRI dated August 18, 2023 demonstrating: C3/4 anterolisthesis with severe canal stenosis and cord edema.  Examination consistent with cervical myelopathy versus peripheral neuropathy versus nerve entrapment.  Obtain EMG/NCV to rule out EtOH peripheral neuropathy.  We discussed epidural steroid injection to improve pain.  If greater than 50% relief for at least 2-3 months can consider repeat as needed every 3 to 4 months.  I had a discussion with the patient regarding the risks of the procedure including bleeding, infection, damage to surrounding structures.  We discussed the potential adverse effects of corticosteroid injection including flushing of the face, lipodystrophy, skin  discoloration, elevated blood glucose, increased blood pressure.  Risks of frequent steroid administration include weight gain, hormonal changes, mood changes, osteoporosis.  Additionally will continue pregabalin.  I do not recommend opioids for chronic nonmalignant pain given the risks of tolerance, dependence, addiction, hormonal imbalance, immunosuppression especially in the setting of EtOH addiction.  2024: Minimal benefit from CARLEY.  Can consider bilateral cervical medial branch peripheral nerve stimulation.  D/C pregabalin due to positive THC on UDS  2024: No benefit from previous CARLEY.  Will plan for bilateral cervical transforaminal epidural steroid injection.    2024: Good benefit from transforaminal approach.  Can consider repeat if needed.  Can consider Sprint PNS for neck pain.  10/11/2024: Return of symptoms after cervical transforaminal MOE.  Will plan for repeat.  Could consider Sprint PNS for neck pain    1. Myelopathy concurrent with and due to spinal stenosis of cervical region                PLAN:  1. Medication Recommendations:   -Recommend Voltaren topical, NSAIDs, Tylenol.  Can trial turmeric 500 mg twice daily if NSAID contraindicated.    -Not currently a candidate for controlled substance due to THC use    2. Physical Therapy: Continue HEP    3. Psychological: defer    4. Complementary and alternative (CAM) Therapies:     5. Labs/Diagnostic studies: UDS revealed positive THC    6. Imagin. Interventions: Schedule repeat bilateral C6/7  transforaminal epidural steroid injection (40517).  -can consider peripheral nerve stimulation    8. Referrals:     9. Records:     10. Lifestyle goals:    Follow-up 4 months after injection; sooner if clinically dictated      [facility]  Aniceto Henderson PA-C      Quality metrics:  Sam Wilhelm reports a pain score of 7.  Given his pain assessment as noted, treatment options were discussed and the  following options were decided upon as a follow-up plan to address the patient's pain: continuation of current treatment plan for pain.

## 2024-10-22 ENCOUNTER — DOCUMENTATION (OUTPATIENT)
Dept: PAIN MEDICINE | Facility: CLINIC | Age: 64
End: 2024-10-22

## 2024-10-22 ENCOUNTER — OUTSIDE FACILITY SERVICE (OUTPATIENT)
Dept: PAIN MEDICINE | Facility: CLINIC | Age: 64
End: 2024-10-22
Payer: COMMERCIAL

## 2024-10-22 PROCEDURE — 64479 NJX AA&/STRD TFRM EPI C/T 1: CPT | Performed by: STUDENT IN AN ORGANIZED HEALTH CARE EDUCATION/TRAINING PROGRAM

## 2024-10-22 PROCEDURE — 99152 MOD SED SAME PHYS/QHP 5/>YRS: CPT | Performed by: STUDENT IN AN ORGANIZED HEALTH CARE EDUCATION/TRAINING PROGRAM

## 2024-10-22 NOTE — PROGRESS NOTES
[facility]  [address] [place] [address]    PROCEDURE: Fluoroscopically-Guided Cervical Transforaminal Epidural Steroid Injection, bilateral C6/7      PRE-OP DIAGNOSIS: Cervical radiculopathy  POST-OP DIAGNOSIS: same    ANTIPLATELET/ANTICOAGULANT: Discussed and managed according to HANY guidelines    CONSENT: Risks, benefits and options were explained to the patient, all questions were answered and written informed consent was obtained.     ANESTHESIA: Moderate sedation was required to maintain comfort, safety, and cooperation during the procedure.  The duration of sedation service was over 10 minutes.  Patient received 2mg IV Versed and 100mcg IV fentanyl.  Independent observation and monitoring was performed by Brenna Manuel.  The patient's level of consciousness and physiologic status was continually monitored with pulse oximetry, EKG from 0837 to 0853.  There were no complications or adverse events during sedation.  After the sedation patient was taken to the recovery area.      PROCEDURE NOTE: The patient was placed supine with a pillow under the neck for support and all pressure points padded. The patient's head was slightly rotated to the contralateral side. Standard ASA monitors were applied. A timeout protocol was performed. Proper protective gear was worn by the physician including a mask, scrub cap, and sterile gloves. The patient's neck was prepped with Chlorhexidine and draped in a sterile fashion. The fluoroscopic image of the target intervertebral foramen was maximized with approximately a 45 degree oblique view to the symptomatic side. The superior end plate closest to the intervertebral foramen was squared. A skin wheal was raised using 1% lidocaine. Utilizing fluoroscopic guidance, the relevant anatomy was visualized and a 25-gauge 3.5 inch spinal needle was advanced intermittently under fluoroscopic guidance using ipsilateral oblique and PA views.  PA fluoroscopic view was used as a safety view to monitor needle depth. Needle trajectory was advanced towards the dorsal region of the intervertebral foramen. The superior articular process was contacted and the needle was carefully walked off into the epidural space. After negative aspiration for blood and CSF, 0.5 ml of Omnipaque contrast was slowly injected. Fluoroscopic imaging revealed a clear outline of the target spinal nerve proximally to the epidural space. Subsequently, a mixture containing dexamethasone 10 mg for a total volume of 1 ml was slowly injected without any paresthesia incurred. The needle was withdrawn and bandages applied at the needle site.the same procedure was then repeated on the contralateral side using the same injectate.  The patient was transferred to the stretcher and taken to recovery in stable condition.      COMPLICATIONS: None       The patient tolerated the procedure well. Vital signs were stable. Sensory and motor exam was unchanged from baseline. The patient was observed in recovery and was discharged after meeting established criteria.    FOLLOW UP:     ADDITIONAL NOTES:     [facility]  Stan Booth MD     Codes:  [id number]  [id number]

## 2024-12-10 DIAGNOSIS — G95.20 CERVICAL SPINAL CORD COMPRESSION: ICD-10-CM

## 2024-12-11 RX ORDER — METHOCARBAMOL 750 MG/1
750 TABLET, FILM COATED ORAL 4 TIMES DAILY PRN
Qty: 30 TABLET | Refills: 2 | Status: SHIPPED | OUTPATIENT
Start: 2024-12-11

## 2025-02-14 ENCOUNTER — OFFICE VISIT (OUTPATIENT)
Dept: PAIN MEDICINE | Facility: CLINIC | Age: 65
End: 2025-02-14
Payer: COMMERCIAL

## 2025-02-14 VITALS — WEIGHT: 137.6 LBS | HEIGHT: 68 IN | BODY MASS INDEX: 20.85 KG/M2

## 2025-02-14 DIAGNOSIS — G99.2 MYELOPATHY CONCURRENT WITH AND DUE TO SPINAL STENOSIS OF CERVICAL REGION: Primary | ICD-10-CM

## 2025-02-14 DIAGNOSIS — M48.02 MYELOPATHY CONCURRENT WITH AND DUE TO SPINAL STENOSIS OF CERVICAL REGION: Primary | ICD-10-CM

## 2025-02-14 PROCEDURE — 99213 OFFICE O/P EST LOW 20 MIN: CPT

## 2025-02-14 NOTE — PROGRESS NOTES
Referring Physician: No referring provider defined for this encounter.    Primary Physician: Cher Hernandez PA    CHIEF COMPLAINT or REASON FOR VISIT: Follow-up (Post Cervical Transforaminal Epidural Steroid Injection, bilateral C6/7) and Neck Pain      Initial history of present illness on 01/10/2024:  Mr. Sam Wilhelm is 64 y.o. male who presents as a new patient referral for evaluation and treatment of chronic bilateral hand pain.  He describes a many year history of increasing bilateral hand and forearm pain radiating up his arms.  He ultimately was evaluated by neurosurgery and diagnosed with myelopathy secondary to C3/4 stenosis.  He underwent cervical decompression in September 2023 with Dr. Howell.  He states that since that time he no longer has the increasing pain up proximal to his elbows but continues to complain of significant bilateral hand and forearm burning pain.  He denies weakness.  He has been managed for this issue with pregabalin and hydrocodone with some benefit.  He has previously undergone bilateral carpal tunnel surgery.  He was recently evaluated by Guy Barr PA-C with neurosurgery who referred for pain management.    Interval history:   Patient returns to clinic today after undergoing repeat cervical transforaminal epidural steroid injection.  He reports good relief from this procedure for approximately 3 months.  His symptoms have returned without any inciting injury or event.  He continues complain of chronic neck pain as well as chronic bilateral hand pain.  We did discuss potential Prialt trial and referral to the [facility].  Patient like to think about this before meeting with them.  He is a little hesitant about the idea of a implanted device.      Interventions:  1/24/2024: C7/T1 CARLEY with minimal benefit minimal benefit  5/28/2024: Bilateral C6/7 TFESI with 50% relief for 4 months.  10/22/2024: Repeat bilateral C6/7 TFESI with 80% relief for 3  months    Objective Pain Scoring:   BRIEF PAIN INVENTORY:  Total score:   Pain Score    02/14/25 1207   PainSc:   7   PainLoc: Neck              PHQ-2:    PHQ-9:    Opioid Risk Tool:         Review of Systems:   ROS negative except as otherwise noted     Past Medical History:   Past Medical History:   Diagnosis Date    Arthritis     B12 deficiency     Cervical disc disorder     Been dealing with this a long time.    Cervical myelopathy with cervical radiculopathy 08/28/2023    CHF (congestive heart failure)     Claustrophobia     COPD (chronic obstructive pulmonary disease)     Coronary artery disease involving native coronary artery of native heart 09/20/2020    CTS (carpal tunnel syndrome)     Surgery on both hands    Elevated cholesterol     Essential hypertension 09/17/2020    Hyperlipidemia     Joint pain     Low back pain     RLS    Neck pain     Renal insufficiency 09/18/2020         Past Surgical History:   Past Surgical History:   Procedure Laterality Date    CARDIAC CATHETERIZATION N/A 09/18/2020    Procedure: LEFT HEART CATH;  Surgeon: Higinio Monroy MD;  Location:  [facility];  Service: Cardiovascular;  Laterality: N/A;    CARPAL TUNNEL RELEASE      CERVICAL LAMINECTOMY DECOMPRESSION POSTERIOR N/A 09/20/2023    Procedure: CERVICAL LAMINECTOMY DECOMPRESSION POSTERIOR C3-4;  Surgeon: Kurtis Howell MD;  Location:  [facility];  Service: Neurosurgery;  Laterality: N/A;    COLONOSCOPY      HAND SURGERY Bilateral     R x2, L x1    NECK SURGERY      TRIGGER POINT INJECTION           Family History   Family History   Problem Relation Age of Onset    Dementia Mother     Alzheimer's disease Mother     GI problems Mother     Heart disease Father     Stroke Father     Heart failure Father     Diabetes Father     Coronary artery disease Father     No Known Problems Sister     No Known Problems Brother     No Known Problems Maternal Grandmother     No Known Problems Maternal Grandfather   "   No Known Problems Paternal Grandmother     No Known Problems Paternal Grandfather          Social History   Social History     Socioeconomic History    Marital status:    Tobacco Use    Smoking status: Every Day     Current packs/day: 0.50     Average packs/day: 0.7 packs/day for 76.6 years (51.1 ttl pk-yrs)     Types: Cigarettes     Start date: 1/1/1974    Smokeless tobacco: Former   Vaping Use    Vaping status: Never Used   Substance and Sexual Activity    Alcohol use: Yes     Alcohol/week: 20.0 standard drinks of alcohol     Types: 20 Cans of beer per week     Comment: 4-6 BEER NIGHTLY    Drug use: Yes     Types: Marijuana    Sexual activity: Yes     Partners: Female     Birth control/protection: None        Medications:     Current Outpatient Medications:     Ascorbic Acid (VITAMIN C PO), Take 1,000 mg by mouth 2 (Two) Times a Day., Disp: , Rfl:     atorvastatin (LIPITOR) 40 MG tablet, Take 1 tablet by mouth Every Night., Disp: , Rfl:     buPROPion XL (WELLBUTRIN XL) 150 MG 24 hr tablet, Take 1 tablet by mouth Every Morning., Disp: , Rfl:     cyanocobalamin 1000 MCG/ML injection, 1 mL., Disp: , Rfl:     Melatonin 10 MG tablet, Take 1 tablet by mouth Every Night., Disp: , Rfl:     methocarbamol (ROBAXIN) 750 MG tablet, TAKE 1 TABLET BY MOUTH FOUR TIMES DAILY AS NEEDED FOR MUSCLE SPASMS, Disp: 30 tablet, Rfl: 2    metoprolol succinate XL (TOPROL-XL) 25 MG 24 hr tablet, Take 0.5 tablets by mouth Every Night., Disp: 60 tablet, Rfl: 0    sacubitril-valsartan (ENTRESTO) 24-26 MG tablet, Take 1 tablet by mouth Every 12 (Twelve) Hours., Disp: 60 tablet, Rfl: 0    spironolactone (ALDACTONE) 25 MG tablet, Take 1 tablet by mouth Daily., Disp: 60 tablet, Rfl: 0        Physical Exam:     Vitals:    02/14/25 1207   Weight: 62.4 kg (137 lb 9.6 oz)   Height: 172.7 cm (67.99\")   PainSc:   7   PainLoc: Neck              General: Alert and oriented, No acute distress.   HEENT: Normocephalic, atraumatic. "   Cardiovascular: No gross edema  Respiratory: Respirations are non-labored    Cervical Spine:   No masses or atrophy  Range of motion - Flexion normal. Extension normal. Lateral rotation normal.   Palpation - nontender   Spurling's - negative       Motor Exam:    Strength: Rate on 1-5 scale Right Left    C5-Elbow flexion, Deltoid 5 5    C6-Wrist extension 5 5    C7- Elbow / finger extension 5 5    C8- Finger flexion 5 5    T1- Intrinsics hand 5 5    Sensory Exam: Full and equal sensation to light touch throughout.    Neurologic: Cranial Nerves II-XII are grossly intact.   Izquierdo’s -negative bilaterally   Psychiatric: Cooperative.   Gait: Normal   Assistive Devices: None      Imaging Studies:   No results found for this or any previous visit.      Impression & Plan:       01/10/2024: Sam Wilhelm is a 64 y.o. male with past medical history significant for HTN, alcoholism, tobacco abuse, cervical myelopathy status post C3/4 decompression with Dr. Howell in September 2023, who presents to the pain clinic for evaluation and treatment of chronic bilateral hand and forearm burning pain.  I personally reviewed and interpreted his preoperative cervical MRI dated August 18, 2023 demonstrating: C3/4 anterolisthesis with severe canal stenosis and cord edema.  Examination consistent with cervical myelopathy versus peripheral neuropathy versus nerve entrapment.  Obtain EMG/NCV to rule out EtOH peripheral neuropathy.  We discussed epidural steroid injection to improve pain.  If greater than 50% relief for at least 2-3 months can consider repeat as needed every 3 to 4 months.  I had a discussion with the patient regarding the risks of the procedure including bleeding, infection, damage to surrounding structures.  We discussed the potential adverse effects of corticosteroid injection including flushing of the face, lipodystrophy, skin discoloration, elevated blood glucose, increased blood pressure.  Risks of frequent steroid  administration include weight gain, hormonal changes, mood changes, osteoporosis.  Additionally will continue pregabalin.  I do not recommend opioids for chronic nonmalignant pain given the risks of tolerance, dependence, addiction, hormonal imbalance, immunosuppression especially in the setting of EtOH addiction.  2024: Minimal benefit from CARLEY.  Can consider bilateral cervical medial branch peripheral nerve stimulation.  D/C pregabalin due to positive THC on UDS  2024: No benefit from previous CARLEY.  Will plan for bilateral cervical transforaminal epidural steroid injection.    2024: Good benefit from transforaminal approach.  Can consider repeat if needed.  Can consider Sprint PNS for neck pain.  10/11/2024: Return of symptoms after cervical transforaminal MOE.  Will plan for repeat.  Could consider Sprint PNS for neck pain  2025: Good relief from repeat cervical transforaminal MOE.  Return of symptoms.  Will plan for repeat.  Discussed Prialt trial today.  Patient hesitant to undergo any implanted devices at the moment.    1. Myelopathy concurrent with and due to spinal stenosis of cervical region                  PLAN:  1. Medication Recommendations:   -Recommend Voltaren topical, NSAIDs, Tylenol.  Can trial turmeric 500 mg twice daily if NSAID contraindicated.    -Not currently a candidate for controlled substance due to THC use    2. Physical Therapy: Continue HEP    3. Psychological: defer    4. Complementary and alternative (CAM) Therapies:     5. Labs/Diagnostic studies: UDS revealed positive THC    6. Imagin. Interventions: Schedule repeat bilateral C6/7  transforaminal epidural steroid injection (55825).  We discussed epidural steroid injection to improve pain.  If greater than 50% relief for at least 2 to 3 months can consider repeat as needed every 3 to 4 months.  Had a discussion with the patient regarding the risk of the procedure including bleeding, infection, damage to  surrounding structures.  We discussed the potential adverse effects of corticosteroid injection including flushing of the face, lipodystrophy, skin discoloration, elevated blood glucose, increased blood pressure.  Risks of frequent steroid administration includes weight gain, hormonal changes, mood changes, osteoporosis.   -can consider peripheral nerve stimulation    8. Referrals: May consider referral to , Dr. Brunner, for potential Prialt trial    9. Records:     10. Lifestyle goals:    Follow-up 4 months after injection; sooner if clinically dictated      [facility]  Aniceto Henderson PA-C      Quality metrics:  Sam Wilhelm reports a pain score of 7.  Given his pain assessment as noted, treatment options were discussed and the following options were decided upon as a follow-up plan to address the patient's pain: continuation of current treatment plan for pain.

## 2025-02-18 ENCOUNTER — OUTSIDE FACILITY SERVICE (OUTPATIENT)
Dept: PAIN MEDICINE | Facility: CLINIC | Age: 65
End: 2025-02-18
Payer: COMMERCIAL

## 2025-02-18 ENCOUNTER — DOCUMENTATION (OUTPATIENT)
Dept: PAIN MEDICINE | Facility: CLINIC | Age: 65
End: 2025-02-18

## 2025-02-18 PROCEDURE — 64479 NJX AA&/STRD TFRM EPI C/T 1: CPT | Performed by: STUDENT IN AN ORGANIZED HEALTH CARE EDUCATION/TRAINING PROGRAM

## 2025-02-18 PROCEDURE — 99152 MOD SED SAME PHYS/QHP 5/>YRS: CPT | Performed by: STUDENT IN AN ORGANIZED HEALTH CARE EDUCATION/TRAINING PROGRAM

## 2025-02-18 NOTE — PROGRESS NOTES
[facility]  [address] [place] [address]    PROCEDURE: Fluoroscopically-Guided Cervical Transforaminal Epidural Steroid Injection, bilateral C6/7      PRE-OP DIAGNOSIS: Cervical radiculopathy  POST-OP DIAGNOSIS: same    ANTIPLATELET/ANTICOAGULANT: Discussed and managed according to HANY guidelines    CONSENT: Risks, benefits and options were explained to the patient, all questions were answered and written informed consent was obtained.     ANESTHESIA: Moderate sedation was required to maintain comfort, safety, and cooperation during the procedure.  The duration of sedation service was over 10 minutes.  Patient received 2mg IV Versed and 100mcg IV fentanyl.  Independent observation and monitoring was performed by Garland Faria.  The patient's level of consciousness and physiologic status was continually monitored with pulse oximetry, EKG from 1119 to 1136.  There were no complications or adverse events during sedation.  After the sedation patient was taken to the recovery area.    PROCEDURE NOTE: The patient was placed supine with a pillow under the neck for support and all pressure points padded. The patient's head was slightly rotated to the contralateral side. Standard ASA monitors were applied. A timeout protocol was performed. Proper protective gear was worn by the physician including a mask, scrub cap, and sterile gloves. The patient's neck was prepped with Chlorhexidine and draped in a sterile fashion. The fluoroscopic image of the right C6/7 intervertebral foramen was maximized with approximately a 45 degree oblique view to the symptomatic side. The superior end plate closest to the intervertebral foramen was squared. A skin wheal was raised using 1% lidocaine. Utilizing fluoroscopic guidance, the relevant anatomy was visualized and a 25-gauge 3.5 inch spinal needle was advanced intermittently under fluoroscopic guidance using ipsilateral oblique and PA  views. PA fluoroscopic view was used as a safety view to monitor needle depth. Needle trajectory was advanced towards the dorsal region of the intervertebral foramen. The superior articular process was contacted and the needle was carefully walked off into the epidural space. After negative aspiration for blood and CSF, 0.5 ml of Omnipaque contrast was slowly injected. Fluoroscopic imaging revealed a clear outline of the target spinal nerve proximally to the epidural space. Subsequently, a mixture containing dexamethasone 10 mg and Lidocaine 1% for a total volume of 1.5 ml was slowly injected without any paresthesia incurred. The needle was withdrawn and bandages applied at the needle sites. The procedure was then repeated on the contralateral side. The patient was transferred to the stretcher and taken to recovery in stable condition.      COMPLICATIONS: None       The patient tolerated the procedure well. Vital signs were stable. Sensory and motor exam was unchanged from baseline. The patient was observed in recovery and was discharged after meeting established criteria.    FOLLOW UP:     ADDITIONAL NOTES:     [facility]  Stan Booth MD     Codes:  [id number]    [id number]

## 2025-06-10 ENCOUNTER — TELEPHONE (OUTPATIENT)
Age: 65
End: 2025-06-10
Payer: COMMERCIAL

## 2025-06-10 NOTE — TELEPHONE ENCOUNTER
Attempted to contact patient, no answer. LVM to return call to office and provided call back number.      HUB relay:     Patient rescheduled for 8/1 in [place] at 1245. If appointment does not work for patient can reschedule to what works best for patient,

## 2025-06-10 NOTE — TELEPHONE ENCOUNTER
Caller: EDGAR GRADY / FRIEND (ON [facility] PAIN MGMT VERBAL + PATIENT IN BACKGROUND, ON CALL AS WELL)    Best call back number: [phone]     RETURNED MISSED CALL FROM HALEY MCCULLOUGH TO RESCHEDULE 6/20 APPT IN [place] (DOC JUAREZ OUT OF OFFICE)     PATIENT PREFERS TO BE RESCHEDULED IN [place] - HUB AGENTS NOT ALLOWED TO (RE)SCHEDULE ANY [facility] APPTS     & [facility]O dey/[facility]UD[facility] PAIN MGMT  SAID TO SEND TO CLINICAL POOL TO RESCHEDULE IN CASE APPT IS RELATED TO A PAIN DEVICE     THANKS

## 2025-06-10 NOTE — TELEPHONE ENCOUNTER
Attempted to contact patient, no answer. LVM to return call to office and provided call back number.      HUB relay:     Reschedule appt on 6/20 to another Friday in [place] or change to [place] location, whichever patient prefers.

## 2025-08-01 ENCOUNTER — OFFICE VISIT (OUTPATIENT)
Dept: PAIN MEDICINE | Facility: CLINIC | Age: 65
End: 2025-08-01
Payer: COMMERCIAL

## 2025-08-01 VITALS — BODY MASS INDEX: 19.85 KG/M2 | HEIGHT: 68 IN | WEIGHT: 131 LBS

## 2025-08-01 DIAGNOSIS — M48.02 MYELOPATHY CONCURRENT WITH AND DUE TO SPINAL STENOSIS OF CERVICAL REGION: Primary | ICD-10-CM

## 2025-08-01 DIAGNOSIS — G99.2 MYELOPATHY CONCURRENT WITH AND DUE TO SPINAL STENOSIS OF CERVICAL REGION: Primary | ICD-10-CM

## 2025-08-01 DIAGNOSIS — G89.4 CHRONIC PAIN SYNDROME: ICD-10-CM

## 2025-08-01 PROCEDURE — 99213 OFFICE O/P EST LOW 20 MIN: CPT

## 2025-08-01 NOTE — PROGRESS NOTES
Referring Physician: No referring provider defined for this encounter.    Primary Physician: Cher Hernandez PA    CHIEF COMPLAINT or REASON FOR VISIT: Neck Pain and Follow-up      Initial history of present illness on 01/10/2024:  Mr. Sam Wilhelm is 65 y.o. male who presents as a new patient referral for evaluation and treatment of chronic bilateral hand pain.  He describes a many year history of increasing bilateral hand and forearm pain radiating up his arms.  He ultimately was evaluated by neurosurgery and diagnosed with myelopathy secondary to C3/4 stenosis.  He underwent cervical decompression in September 2023 with Dr. Howell.  He states that since that time he no longer has the increasing pain up proximal to his elbows but continues to complain of significant bilateral hand and forearm burning pain.  He denies weakness.  He has been managed for this issue with pregabalin and hydrocodone with some benefit.  He has previously undergone bilateral carpal tunnel surgery.  He was recently evaluated by Guy Barr PA-C with neurosurgery who referred for pain management.    Interval history:   Patient returns to clinic today after undergoing repeat cervical transforaminal epidural steroid injections in February 2025.  He reports excellent pain relief from this procedure for approximately 4 months.  He has noticed a return in his symptoms.  There is complaints of bilateral bilateral arm numbness tingling burning pain.  He is interested in additional strategies to help alleviate his pain.      Interventions:  1/24/2024: C7/T1 CARLEY with minimal benefit minimal benefit  5/28/2024: Bilateral C6/7 TFESI with 50% relief for 4 months.  10/22/2024: Repeat bilateral C6/7 TFESI with 80% relief for 3 months  2/18/2025: Repeat bilateral C6/7 TFESI with 80% relief for 4 months    Objective Pain Scoring:   BRIEF PAIN INVENTORY:  Total score:   Pain Score    08/01/25 1242   PainSc: 8    PainLoc: Hand                 PHQ-2:    PHQ-9:    Opioid Risk Tool:         Review of Systems:   ROS negative except as otherwise noted     Past Medical History:   Past Medical History:   Diagnosis Date    Arthritis     B12 deficiency     Cervical disc disorder     Been dealing with this a long time.    Cervical myelopathy with cervical radiculopathy 08/28/2023    CHF (congestive heart failure)     Claustrophobia     COPD (chronic obstructive pulmonary disease)     Coronary artery disease involving native coronary artery of native heart 09/20/2020    CTS (carpal tunnel syndrome)     Surgery on both hands    Elevated cholesterol     Essential hypertension 09/17/2020    Hyperlipidemia     Joint pain     Low back pain     RLS    Neck pain     Renal insufficiency 09/18/2020         Past Surgical History:   Past Surgical History:   Procedure Laterality Date    CARDIAC CATHETERIZATION N/A 09/18/2020    Procedure: LEFT HEART CATH;  Surgeon: Higinio Monroy MD;  Location:  [facility];  Service: Cardiovascular;  Laterality: N/A;    CARPAL TUNNEL RELEASE      CERVICAL LAMINECTOMY DECOMPRESSION POSTERIOR N/A 09/20/2023    Procedure: CERVICAL LAMINECTOMY DECOMPRESSION POSTERIOR C3-4;  Surgeon: Kurtis Howell MD;  Location: [facility];  Service: Neurosurgery;  Laterality: N/A;    COLONOSCOPY      HAND SURGERY Bilateral     R x2, L x1    NECK SURGERY      TRIGGER POINT INJECTION           Family History   Family History   Problem Relation Age of Onset    Dementia Mother     Alzheimer's disease Mother     GI problems Mother     Other Mother         Alzheimer's    Heart disease Father     Stroke Father     Heart failure Father     Diabetes Father     Coronary artery disease Father     No Known Problems Sister     No Known Problems Brother     No Known Problems Maternal Grandmother     No Known Problems Maternal Grandfather     No Known Problems Paternal Grandmother     No Known Problems Paternal Grandfather          Social History  "  Social History     Socioeconomic History    Marital status:    Tobacco Use    Smoking status: Every Day     Current packs/day: 0.50     Average packs/day: 0.8 packs/day for 92.9 years (75.0 ttl pk-yrs)     Types: Cigarettes     Start date: 1/1/1974    Smokeless tobacco: Former   Vaping Use    Vaping status: Never Used   Substance and Sexual Activity    Alcohol use: Yes     Alcohol/week: 20.0 standard drinks of alcohol     Types: 20 Cans of beer per week     Comment: 4-6 BEER NIGHTLY    Drug use: Yes     Types: Marijuana    Sexual activity: Yes     Partners: Female     Birth control/protection: None        Medications:     Current Outpatient Medications:     Ascorbic Acid (VITAMIN C PO), Take 1,000 mg by mouth 2 (Two) Times a Day., Disp: , Rfl:     atorvastatin (LIPITOR) 40 MG tablet, Take 1 tablet by mouth Every Night., Disp: , Rfl:     Melatonin 10 MG tablet, Take 1 tablet by mouth Every Night., Disp: , Rfl:     sacubitril-valsartan (ENTRESTO) 24-26 MG tablet, Take 1 tablet by mouth Every 12 (Twelve) Hours., Disp: 60 tablet, Rfl: 0    spironolactone (ALDACTONE) 25 MG tablet, Take 1 tablet by mouth Daily., Disp: 60 tablet, Rfl: 0    buPROPion XL (WELLBUTRIN XL) 150 MG 24 hr tablet, Take 1 tablet by mouth Every Morning., Disp: , Rfl:     cyanocobalamin 1000 MCG/ML injection, 1 mL., Disp: , Rfl:     methocarbamol (ROBAXIN) 750 MG tablet, TAKE 1 TABLET BY MOUTH FOUR TIMES DAILY AS NEEDED FOR MUSCLE SPASMS, Disp: 30 tablet, Rfl: 2    metoprolol succinate XL (TOPROL-XL) 25 MG 24 hr tablet, Take 0.5 tablets by mouth Every Night. (Patient not taking: Reported on 8/1/2025), Disp: 60 tablet, Rfl: 0        Physical Exam:     Vitals:    08/01/25 1242   Weight: 59.4 kg (131 lb)   Height: 172.7 cm (68\")   PainSc: 8    PainLoc: Hand                General: Alert and oriented, No acute distress.   HEENT: Normocephalic, atraumatic.   Cardiovascular: No gross edema  Respiratory: Respirations are non-labored    Cervical " Spine:   No masses or atrophy  Range of motion - Flexion normal. Extension normal. Lateral rotation normal.   Palpation - nontender   Spurling's - negative       Motor Exam:    Strength: Rate on 1-5 scale Right Left    C5-Elbow flexion, Deltoid 5 5    C6-Wrist extension 5 5    C7- Elbow / finger extension 5 5    C8- Finger flexion 5 5    T1- Intrinsics hand 5 5    Sensory Exam: Full and equal sensation to light touch throughout.    Neurologic: Cranial Nerves II-XII are grossly intact.   Izquierdo’s -negative bilaterally   Psychiatric: Cooperative.   Gait: Normal   Assistive Devices: None      Imaging Studies:   No results found for this or any previous visit.      Impression & Plan:       01/10/2024: Sam Wilhelm is a 65 y.o. male with past medical history significant for HTN, alcoholism, tobacco abuse, cervical myelopathy status post C3/4 decompression with Dr. Howell in September 2023, who presents to the pain clinic for evaluation and treatment of chronic bilateral hand and forearm burning pain.  I personally reviewed and interpreted his preoperative cervical MRI dated August 18, 2023 demonstrating: C3/4 anterolisthesis with severe canal stenosis and cord edema.  Examination consistent with cervical myelopathy versus peripheral neuropathy versus nerve entrapment.  Obtain EMG/NCV to rule out EtOH peripheral neuropathy.  We discussed epidural steroid injection to improve pain.  If greater than 50% relief for at least 2-3 months can consider repeat as needed every 3 to 4 months.  I had a discussion with the patient regarding the risks of the procedure including bleeding, infection, damage to surrounding structures.  We discussed the potential adverse effects of corticosteroid injection including flushing of the face, lipodystrophy, skin discoloration, elevated blood glucose, increased blood pressure.  Risks of frequent steroid administration include weight gain, hormonal changes, mood changes, osteoporosis.   Additionally will continue pregabalin.  I do not recommend opioids for chronic nonmalignant pain given the risks of tolerance, dependence, addiction, hormonal imbalance, immunosuppression especially in the setting of EtOH addiction.  2024: Minimal benefit from CARLEY.  Can consider bilateral cervical medial branch peripheral nerve stimulation.  D/C pregabalin due to positive THC on UDS  2024: No benefit from previous CARLEY.  Will plan for bilateral cervical transforaminal epidural steroid injection.    2024: Good benefit from transforaminal approach.  Can consider repeat if needed.  Can consider Sprint PNS for neck pain.  10/11/2024: Return of symptoms after cervical transforaminal MOE.  Will plan for repeat.  Could consider Sprint PNS for neck pain  2025: Good relief from repeat cervical transforaminal MOE.  Return of symptoms.  Will plan for repeat.  Discussed Prialt trial today.  Patient hesitant to undergo any implanted devices at the moment.  2025: Good relief from cervical TFESI.  Return to symptoms.  Will plan for repeat    1. Myelopathy concurrent with and due to spinal stenosis of cervical region    2. Chronic pain syndrome                    PLAN:  1. Medication Recommendations:   -Recommend Voltaren topical, NSAIDs, Tylenol.  Can trial turmeric 500 mg twice daily if NSAID contraindicated.    -Not currently a candidate for controlled substance due to THC use    2. Physical Therapy: Continue HEP    3. Psychological: defer    4. Complementary and alternative (CAM) Therapies:     5. Labs/Diagnostic studies: UDS revealed positive THC    6. Imagin. Interventions: Schedule repeat bilateral C6/7  transforaminal epidural steroid injection (08252).  We discussed epidural steroid injection to improve pain.  If greater than 50% relief for at least 2 to 3 months can consider repeat as needed every 3 to 4 months.  Had a discussion with the patient regarding the risk of the procedure  including bleeding, infection, damage to surrounding structures.  We discussed the potential adverse effects of corticosteroid injection including flushing of the face, lipodystrophy, skin discoloration, elevated blood glucose, increased blood pressure.  Risks of frequent steroid administration includes weight gain, hormonal changes, mood changes, osteoporosis.  Additionally, there is an increased risk for damage to surrounding vasculature with transforaminal approach.  -can consider peripheral nerve stimulation    8. Referrals: May consider referral to , Dr. Brunner, for potential Prialt trial.  Patient is hesitant to undergo any implanted device/pump at this time.    9. Records:     10. Lifestyle goals:    Follow-up 4-5 months after injection; sooner if clinically dictated      [facility]  Aniceto Henderson PA-C      Quality metrics:  Sam Wilhelm reports a pain score of 8.  Given his pain assessment as noted, treatment options were discussed and the following options were decided upon as a follow-up plan to address the patient's pain: continuation of current treatment plan for pain  .

## 2025-08-14 ENCOUNTER — DOCUMENTATION (OUTPATIENT)
Dept: PAIN MEDICINE | Facility: CLINIC | Age: 65
End: 2025-08-14

## 2025-08-14 ENCOUNTER — OUTSIDE FACILITY SERVICE (OUTPATIENT)
Dept: PAIN MEDICINE | Facility: CLINIC | Age: 65
End: 2025-08-14
Payer: COMMERCIAL

## (undated) DEVICE — SYR CONTRL PRESS/LO FIX/M/LL W/THMB/RNG 10ML

## (undated) DEVICE — DISPOSABLE BIPOLAR FORCEPS 7 3/4" (19.7CM) SCOVILLE BAYONET, INSULATED, 1.5MM TIP AND 12 FT. (3.6M) CABLE: Brand: KIRWAN

## (undated) DEVICE — APPL CHLORAPREP TINTED 26ML TEAL

## (undated) DEVICE — PROXIMATE RH ROTATING HEAD SKIN STAPLERS (35 WIDE) CONTAINS 35 STAINLESS STEEL STAPLES: Brand: PROXIMATE

## (undated) DEVICE — CATH DIAG EXPO .045 FL3  5F 100CM

## (undated) DEVICE — ANTIBACTERIAL UNDYED BRAIDED (POLYGLACTIN 910), SYNTHETIC ABSORBABLE SUTURE: Brand: COATED VICRYL

## (undated) DEVICE — DEV COMP RAD PRELUDESYNC 24CM

## (undated) DEVICE — GLV SURG PREMIERPRO MIC LTX PF SZ8 BRN

## (undated) DEVICE — SPNG GZ WOVN 4X4IN 12PLY 10/BX STRL

## (undated) DEVICE — GW INQWIRE FC PTFE STD J/1.5 .035 260

## (undated) DEVICE — PK CATH CARD 10

## (undated) DEVICE — CATH DIAG EXPO M/ PK 5F FL4/FR4 PIG

## (undated) DEVICE — NEEDLE,HYPODERM,SAFETY, 22GX1.5: Brand: MEDLINE

## (undated) DEVICE — MODEL BT2000 P/N 700287-012KIT CONTENTS: MANIFOLD WITH SALINE AND CONTRAST PORTS, SALINE TUBING WITH SPIKE AND HAND SYRINGE, TRANSDUCER: Brand: BT2000 AUTOMATED MANIFOLD KIT

## (undated) DEVICE — C-ARM: Brand: UNBRANDED

## (undated) DEVICE — PATIENT RETURN ELECTRODE, SINGLE-USE, CONTACT QUALITY MONITORING, ADULT, WITH 9FT CORD, FOR PATIENTS WEIGING OVER 33LBS. (15KG): Brand: MEGADYNE

## (undated) DEVICE — PK NEURO DISC 10

## (undated) DEVICE — INTRO SHEATH PRELUDE IDEAL SPRNG COIL 021 6F 23X80CM

## (undated) DEVICE — MAYFIELD® DISPOSABLE ADULT SKULL PIN (PLASTIC BASE): Brand: MAYFIELD®

## (undated) DEVICE — STRAP POSTN KN/BDY FM 5X72IN DISP

## (undated) DEVICE — DRSNG WND GZ PAD BORDERED 4X8IN STRL

## (undated) DEVICE — TP SILK DURAPORE 3IN

## (undated) DEVICE — STRIP,CLOSURE,WOUND,MEDI-STRIP,1/2X4: Brand: MEDLINE

## (undated) DEVICE — TOOL MR8-14BA60 MR8 14CM BALL 6MM: Brand: MIDAS REX MR8

## (undated) DEVICE — GLV SURG BIOGEL LTX PF 8

## (undated) DEVICE — SKIN PREP TRAY W/CHG: Brand: MEDLINE INDUSTRIES, INC.

## (undated) DEVICE — Device

## (undated) DEVICE — TOOL MR8-14MH30 MR8 14CM MATCH 3MM: Brand: MIDAS REX MR8